# Patient Record
Sex: MALE | Race: WHITE | NOT HISPANIC OR LATINO | Employment: OTHER | URBAN - METROPOLITAN AREA
[De-identification: names, ages, dates, MRNs, and addresses within clinical notes are randomized per-mention and may not be internally consistent; named-entity substitution may affect disease eponyms.]

---

## 2017-01-20 ENCOUNTER — ALLSCRIPTS OFFICE VISIT (OUTPATIENT)
Dept: OTHER | Facility: OTHER | Age: 62
End: 2017-01-20

## 2017-03-01 ENCOUNTER — GENERIC CONVERSION - ENCOUNTER (OUTPATIENT)
Dept: OTHER | Facility: OTHER | Age: 62
End: 2017-03-01

## 2017-03-28 ENCOUNTER — GENERIC CONVERSION - ENCOUNTER (OUTPATIENT)
Dept: OTHER | Facility: OTHER | Age: 62
End: 2017-03-28

## 2017-03-28 LAB
LEFT EYE DIABETIC RETINOPATHY: NORMAL
RIGHT EYE DIABETIC RETINOPATHY: NORMAL

## 2017-06-20 ENCOUNTER — GENERIC CONVERSION - ENCOUNTER (OUTPATIENT)
Dept: OTHER | Facility: OTHER | Age: 62
End: 2017-06-20

## 2017-07-03 DIAGNOSIS — K76.0 FATTY (CHANGE OF) LIVER, NOT ELSEWHERE CLASSIFIED: ICD-10-CM

## 2017-07-03 DIAGNOSIS — K21.9 GASTRO-ESOPHAGEAL REFLUX DISEASE WITHOUT ESOPHAGITIS: ICD-10-CM

## 2017-07-03 DIAGNOSIS — E78.2 MIXED HYPERLIPIDEMIA: ICD-10-CM

## 2017-07-03 DIAGNOSIS — Z12.5 ENCOUNTER FOR SCREENING FOR MALIGNANT NEOPLASM OF PROSTATE: ICD-10-CM

## 2017-07-03 DIAGNOSIS — R74.8 ABNORMAL LEVELS OF OTHER SERUM ENZYMES: ICD-10-CM

## 2017-07-03 DIAGNOSIS — I10 ESSENTIAL (PRIMARY) HYPERTENSION: ICD-10-CM

## 2017-07-03 DIAGNOSIS — E11.9 TYPE 2 DIABETES MELLITUS WITHOUT COMPLICATIONS (HCC): ICD-10-CM

## 2017-07-20 ENCOUNTER — ALLSCRIPTS OFFICE VISIT (OUTPATIENT)
Dept: OTHER | Facility: OTHER | Age: 62
End: 2017-07-20

## 2017-07-24 ENCOUNTER — GENERIC CONVERSION - ENCOUNTER (OUTPATIENT)
Dept: OTHER | Facility: OTHER | Age: 62
End: 2017-07-24

## 2017-08-03 ENCOUNTER — GENERIC CONVERSION - ENCOUNTER (OUTPATIENT)
Dept: OTHER | Facility: OTHER | Age: 62
End: 2017-08-03

## 2017-08-17 ENCOUNTER — GENERIC CONVERSION - ENCOUNTER (OUTPATIENT)
Dept: OTHER | Facility: OTHER | Age: 62
End: 2017-08-17

## 2018-01-01 DIAGNOSIS — K76.0 FATTY (CHANGE OF) LIVER, NOT ELSEWHERE CLASSIFIED: ICD-10-CM

## 2018-01-01 DIAGNOSIS — K80.20 CALCULUS OF GALLBLADDER WITHOUT CHOLECYSTITIS WITHOUT OBSTRUCTION: ICD-10-CM

## 2018-01-01 DIAGNOSIS — Z12.5 ENCOUNTER FOR SCREENING FOR MALIGNANT NEOPLASM OF PROSTATE: ICD-10-CM

## 2018-01-01 DIAGNOSIS — K85.90 ACUTE PANCREATITIS WITHOUT INFECTION OR NECROSIS: ICD-10-CM

## 2018-01-01 DIAGNOSIS — E78.2 MIXED HYPERLIPIDEMIA: ICD-10-CM

## 2018-01-01 DIAGNOSIS — E11.9 TYPE 2 DIABETES MELLITUS WITHOUT COMPLICATIONS (HCC): ICD-10-CM

## 2018-01-01 DIAGNOSIS — Z90.49 ACQUIRED ABSENCE OF OTHER SPECIFIED PARTS OF DIGESTIVE TRACT: ICD-10-CM

## 2018-01-01 DIAGNOSIS — K21.9 GASTRO-ESOPHAGEAL REFLUX DISEASE WITHOUT ESOPHAGITIS: ICD-10-CM

## 2018-01-01 DIAGNOSIS — I10 ESSENTIAL (PRIMARY) HYPERTENSION: ICD-10-CM

## 2018-01-13 VITALS
WEIGHT: 252.6 LBS | BODY MASS INDEX: 37.41 KG/M2 | SYSTOLIC BLOOD PRESSURE: 122 MMHG | HEIGHT: 69 IN | HEART RATE: 76 BPM | DIASTOLIC BLOOD PRESSURE: 74 MMHG | RESPIRATION RATE: 18 BRPM | TEMPERATURE: 100.1 F

## 2018-01-13 VITALS
TEMPERATURE: 99.4 F | HEIGHT: 69 IN | RESPIRATION RATE: 18 BRPM | HEART RATE: 84 BPM | BODY MASS INDEX: 41.12 KG/M2 | SYSTOLIC BLOOD PRESSURE: 124 MMHG | WEIGHT: 277.65 LBS | DIASTOLIC BLOOD PRESSURE: 78 MMHG

## 2018-01-13 NOTE — MISCELLANEOUS
Assessment    1  Acute pancreatitis (577 0) (K85 90)   2  Cholelithiasis (574 20) (K80 20)   3  S/P laparoscopic cholecystectomy (V45 89) (Z90 49)   4  Benign essential hypertension (401 1) (I10)   5  Diabetes mellitus, type 2 (250 00) (E11 9)   6  Mixed hyperlipidemia (272 2) (E78 2)   7  GERD (gastroesophageal reflux disease) (530 81) (K21 9)   8  Fatty liver (571 8) (K76 0)   9  Candidal intertrigo (112 3) (B37 2)   10  Obstructive sleep apnea (327 23) (G47 33)    Plan  Acute pancreatitis, Benign essential hypertension, Cholelithiasis, Diabetes mellitus, type  2, Fatty liver, GERD (gastroesophageal reflux disease), Mixed hyperlipidemia, S/P  laparoscopic cholecystectomy, Screening for prostate cancer    · (1) CBC/PLT/DIFF; Status:Active; Requested [id number]; Perform:[facility]; VHI:[id number];TQAIUMA; For:Acute pancreatitis, Benign essential hypertension, Cholelithiasis, Diabetes mellitus, type 2, Fatty liver, GERD (gastroesophageal reflux disease), Mixed hyperlipidemia, S/P laparoscopic cholecystectomy, Screening for prostate cancer; Ordered By:Linwood Harvey;   · (1) COMPREHENSIVE METABOLIC PANEL; Status:Active; Requested VICTORINA:[id number]; Perform:[facility]; CYF:[id number];IPMLQLZ; For:Acute pancreatitis, Benign essential hypertension, Cholelithiasis, Diabetes mellitus, type 2, Fatty liver, GERD (gastroesophageal reflux disease), Mixed hyperlipidemia, S/P laparoscopic cholecystectomy, Screening for prostate cancer; Ordered By:Linwood Harvey;   · (1) HEMOGLOBIN A1C; Status:Active; Requested TU:[id number]; Perform:[facility]; CCS:[id number];APMJQQL; For:Acute pancreatitis, Benign essential hypertension, Cholelithiasis, Diabetes mellitus, type 2, Fatty liver, GERD (gastroesophageal reflux disease), Mixed hyperlipidemia, S/P laparoscopic cholecystectomy, Screening for prostate cancer; Ordered By:Linwood Harvey;   · (1) LIPID PANEL, FASTING; Status:Active;  Requested DDS:[id number]; Perform:[facility]; AMU:[id number];[id number]; For:Acute pancreatitis, Benign essential hypertension, Cholelithiasis, Diabetes mellitus, type 2, Fatty liver, GERD (gastroesophageal reflux disease), Mixed hyperlipidemia, S/P laparoscopic cholecystectomy, Screening for prostate cancer; Ordered By:Linwood Harvey;   · (1) MICROALBUMIN CREATININE RATIO, RANDOM URINE; Status:Active; Requested  BRX:[id number]; Perform:[facility]; DRL:[id number];LXQAQOK; For:Acute pancreatitis, Benign essential hypertension, Cholelithiasis, Diabetes mellitus, type 2, Fatty liver, GERD (gastroesophageal reflux disease), Mixed hyperlipidemia, S/P laparoscopic cholecystectomy, Screening for prostate cancer; Ordered By:Linwood Harvey;   · (1) PSA (SCREEN) (Dx V76 44 Screen for Prostate Cancer); Status:Active; Requested  ZOA:[id number]; Perform:[facility]; IGL:[id number];QCXGCGL; For:Acute pancreatitis, Benign essential hypertension, Cholelithiasis, Diabetes mellitus, type 2, Fatty liver, GERD (gastroesophageal reflux disease), Mixed hyperlipidemia, S/P laparoscopic cholecystectomy, Screening for prostate cancer; Ordered By:Linwood Harvey;   · (1) TSH; Status:Active; Requested PIJ:[id number]; Perform:[facility]; SPL:[id number];YFRIAUX; For:Acute pancreatitis, Benign essential hypertension, Cholelithiasis, Diabetes mellitus, type 2, Fatty liver, GERD (gastroesophageal reflux disease), Mixed hyperlipidemia, S/P laparoscopic cholecystectomy, Screening for prostate cancer; Ordered By:Michael Harvey Maw; Benign essential hypertension    · From  Lisinopril 20 MG Oral Tablet take 1 tablet by mouth one time daily To  Lisinopril 10 MG Oral Tablet TAKE 1 TABLET DAILY   Rx By: Kisha Ponce; Dispense: 0 Days ; #:90 Tablet;  Refill: 3; For: Benign essential hypertension; OSWALDO = N; Print Rx   · Follow-up visit in 6 months Evaluation and Treatment  Follow-up  Status: Hold For -  Scheduling  Requested for: 67Gjk6528   Ordered; For: Benign essential hypertension; Ordered By: Chacha Montgomery Performed:  Due: 75MBO1269   · We encourage you to begin to make lifestyle changes to help control your blood  pressure  These may include losing weight, increasing your activity level, limiting salt in  your diet, decreasing alcohol intake, and eating a diet low in fat and rich in fruits  and vegetables ; Status:Complete;   Done: 58YMK6609 02:55PM   Ordered; For:Benign essential hypertension; Ordered By:Linwood Harvey;  Candidal intertrigo    · Nystatin 249761 UNIT/GM External Cream; APPLY 2-3 TIMES DAILY TO AFFECTED  AREA(S)   Rx By: Chacha Montgomery; Dispense: 0 Days ; #:2 X 30 GM Tube; Refill: 1; For: Candidal intertrigo; OSWALDO = N; Verified Transmission to [id number] Last Updated By: System, [employer]; 7/20/2017 1:58:50 PM  Diabetes mellitus, type 2    · Eat a normal well-balanced diet ; Status:Complete;   Done: 39OMU6938 02:55PM   Ordered; For:Diabetes mellitus, type 2; Ordered By:Martin, Leitha Heimlich;   · Have your eyes examined by an eye doctor every year ; Status:Complete;   Done:  94VGR0356 02:55PM   Ordered; For:Diabetes mellitus, type 2; Ordered By:Martin, Leitha Heimlich;   · It is important to take good care of your feet if you have diabetes ; Status:Complete;    Done: 15OBL3833 02:55PM   Ordered; For:Diabetes mellitus, type 2; Ordered By:Martin, Leitha Heimlich; Mixed hyperlipidemia    · A diet low in sugar may help your condition ; Status:Complete;   Done: 84VEP4363  02:55PM   Ordered; For:Mixed hyperlipidemia; Ordered By:Martin, Leitha Heimlich;   · Eat a low fat and low cholesterol diet ; Status:Complete;   Done: 85KMY8047 02:55PM   Ordered; For:Mixed hyperlipidemia; Ordered By:Martin, Leitha Heimlich; Discussion/Summary  Discussion Summary:   Continue with current medications  repeat labs prior to next visit in 6 months   follow up with general surgery this month  script for Nystatin cream    Medication SE Review and Pt Understands Tx: Possible side effects of new medications were reviewed with the patient/guardian today  The treatment plan was reviewed with the patient/guardian  The patient/guardian understands and agrees with the treatment plan      History of Present Illness  TCM Communication [place]: The patient is being contacted for follow-up after hospitalization  Hospital course was discussed with the inpatient physician  He was hospitalized at [facility]  The date of admission: 07/01/2017, date of discharge: 07/07/2017  Diagnosis: PANCREATITIS, ACUTE, HTN, GERD, TOBACCO ABUSE, OSMAN, CHRONIC DIASTOLIC CHF  He was discharged to home  Medications were not reviewed today  He scheduled a follow up appointment  Counseling was provided to the patient  Topics counseled included importance of compliance with treatment  Communication performed and completed by Skyler Tate   HPI: Followup visit  SKYLER s/p admission for acute pancreatitis with prior episode of acute gallstone pancreatitis 08/2016  admission abdominal u/s + cholelithiasis without evidence of cholecystitis  mild hepatic steatosis  CT abdomen and pelvis showed without evidence of biliary obstruction or cholecystitis  laura pancreatic edema indicative of acute edematous pancreatitis  no necrosis or fluid collection  MRI/MRCP numerous gallstones  minimal pericholecystic fluid  top normal CBD  no evidence of stones  patient underwent laparoscopic cholecystectomy with intra operative cholangiogram  he is on no pain medications  bowels normal  no voiding difficulties  his dose of Lisinopril was decreased to 10 mg/day  he is Furosemide 20 mg 2/day for LE edema  out patient labs 07/2017 see note  CBC WBC 10 100  Hgb 12 2 MCV 91  platelets 870,858  CMP   creatinine 1 03  electrolytes normal except for Na+ 134   LFTs normal except for alkaline phosphatase 129 and ALT 66  total bilirubin normal at 0 4  type 2 DM diet controlled A1c 01/2017 7 4 increased from 7 0  07/2016 urine microalbumin <0 9  on ACE  no DPN  eye exam > 1 year ago  hypertension BPs stable on current regimen  mixed hyperlipidemia lipid profile 01/2017 cholesterol 161  TGs 204  HDL 33  LDL 87  LFTs normal  07/2016 TSH 1 60  admission 08/2016 for acute gallstone pancreatitis with elevated LFTs patient presented with acute onset of mid epigastric abdominal pain  +nausea  no vomiting or diarrhea  no fevers  he had similar episodes of abdominal pain prior to admission  abdominal u/s had been recommended  Admission testing CBC WBC 17,500  Hemoglobin 14 3  Platelet count 871,573  Lipase 49,945  Chemistry profile random blood glucose 227 creatinine 1 1  Electrolytes normal except for calcium 8 3  Albumin low at 3 1      Normal total bilirubin and alkaline phosphatase  Troponin less than 0 02  NT pro BNP 34  lipid profile normal  TGs 120  chest x ray no active disease  EKG normal sinus rhythm  low voltage no acute changes  abdominal ultrasound fatty liver  Multiple gallstones as well as sludge  Spleen normal  intra and extrahepatic ducts normal common bile duct 5 mm  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  Trace amount of hydronephrosis left kidney  No renal masses  CT scan chest, abdomen and pelvis no pulmonary embolus  atelectatic changes posterior lungs  Small to moderate size pericardial effusion  Peripancreatic edema/fluid  Postsurgical distortion right kidney  9 mm calculus in the lower pole left kidney  Review of Systems  Complete-Male:   Constitutional: recent weight gain and 25 lb weight gain from 01/2017, but no fever and no chills  Eyes: no eyesight problems  Cardiovascular: lower extremity edema and LE edema with prior cardiology evaluation  he was started on Furosemide 40 mg daily  venous dopplers no DVT  not on Ca++ channel blocker  no NSAIDs , but no chest pain and no palpitations  Respiratory: patient diagnosed with OSMAN this year   he is now wearing CPAP  "feels great", but no cough, no orthopnea, no wheezing, no shortness of breath during exertion and no PND  Gastrointestinal: colonoscopy 04/2013 , but no abdominal pain, no nausea, no vomiting, no constipation, no diarrhea and no blood in stools  Genitourinary: 12/2015 PSA 0 49, but no dysuria, no urinary hesitancy, no incontinence and no nocturia  Musculoskeletal: no arthralgias and no myalgias  Integumentary: a rash and pruritic rash inguinal area  Neurological: no headache and no dizziness  Endocrine: erectile dysfunction and uses prn Viagra  , but no muscle weakness  Active Problems    1  Benign essential hypertension (401 1) (I10)   2  Diabetes mellitus, type 2 (250 00) (E11 9)   3  ED (erectile dysfunction) (607 84) (N52 9)   4  Fatty liver (571 8) (K76 0)   5  GERD (gastroesophageal reflux disease) (530 81) (K21 9)   6  Mixed hyperlipidemia (272 2) (E78 2)   7  Screening for prostate cancer (V76 44) (Z12 5)    Past Medical History    1  History of Elevated liver enzymes (790 5) (R74 8)   2  History of Impaired fasting glucose (790 21) (R73 01)   3  History of Nephrolithiasis (592 0) (N20 0)   4  History of Pericardial effusion (423 9) (I31 3)   5  History of Rectal pain (569 42) (K62 89)    Surgical History    1  History of Hemorrhoidectomy   2  History of Kidney Surgery   3  History of Renal Lithotripsy    Family History  Father    1  Family history of Diabetes mellitus, type 2    Social History    · Current every day smoker (305 1) (F17 200)    Current Meds   1  Aspirin 81 MG Oral Tablet Delayed Release; Take 1 tablet daily Recorded   2  Atorvastatin Calcium 10 MG Oral Tablet; TAKE 1 TABLET DAILY; Therapy: 61ITR1498 to (Amanda Willard)  Requested for: 91TOD4706; Last   Rx:12Mar2017 Ordered   3  Fish Oil CAPS; Therapy: (Recorded:19Mar2013) to Recorded   4  Furosemide 20 MG Oral Tablet; take 1 tablet daily as needed Recorded   5   Lisinopril 20 MG Oral Tablet; take 1 tablet by mouth one time daily; Therapy: 33LHS8555 to (Soha Rho)  Requested for: 91WKX8311; Last   Rx:12Mar2017 Ordered   6  Multivitamins CAPS; Therapy: (Recorded:19Mar2013) to Recorded   7  Omeprazole 40 MG Oral Capsule Delayed Release; take 1 capsule daily; Therapy: 56Btu9834 to (Evaluate:46Cbg0168)  Requested for: 87JIU0053; Last   Rx:08Jun2017 Ordered   8  Viagra 100 MG Oral Tablet; TAKE AS DIRECTED; Therapy: 60JNF6615 to (Last Rx:06Apr2016)  Requested for: 06Apr2016 Ordered    Allergies    1  No Known Drug Allergies    Vitals  Signs   Recorded: 20Jul2017 01:37PM   Temperature: 100 1 F  Heart Rate: 76  Respiration: 18  Systolic: 148  Diastolic: 74  Height: 5 ft 9 in  Weight: 252 lb 9 6 oz  BMI Calculated: 37 3  BSA Calculated: 2 28    Physical Exam    Constitutional   General appearance: No acute distress, well appearing and well nourished  Eyes   Conjunctiva and lids: No erythema, swelling or discharge  sclera anicteric  Ears, Nose, Mouth, and Throat   Otoscopic examination: Tympanic membranes translucent with normal light reflex  Canals patent without erythema  Lips, teeth, and gums: Abnormal   Examination of the teeth revealed upper dentures  Oropharynx: Abnormal   The palate examination showed a floppy soft palate  Neck   Neck: Supple, symmetric, trachea midline, no masses  Thyroid: Normal, no thyromegaly  There were no thyroid nodules  Pulmonary   Auscultation of lungs: Clear to auscultation  no rales or crackles were heard bilaterally  no wheezing  no diminished breath sounds  Cardiovascular   Auscultation of heart: Normal rate and rhythm, normal S1 and S2, no murmurs  Heart sounds: no gallop heard  Carotid pulses: 2+ bilaterally  no bruit heard over the right carotid and no bruit heard over the left carotid  Abdominal aorta: Normal   Abdominal aorta: no bruit heard     Examination of extremities for edema and/or varicosities: Abnormal   bilateral ankle trace+ pitting edema  + stasis pigmentation changes  no calf tenderness or Homans sign  Abdomen   Abdomen: Non-tender, no masses  Bowel sounds were normal  Skin findings: a scar  There was a negative Chaudhary's sign  well healed incisions abdomen  Liver and spleen: No hepatomegaly or splenomegaly  Examination for hernias: Abnormal   No reducible umbilical hernia was palpated  Lymphatic   Palpation of lymph nodes in neck: No lymphadenopathy  no anterior cervical node enlargement, no posterior cervical node enlargement, no submandibular node enlargement and no supraclavicular node enlargement  Skin   Skin and subcutaneous tissue: Abnormal   candidal intertrigo inguinal areas and scrotum  Psychiatric   Mood and affect: Normal        Results/Data  PHQ-2 Adult Depression Screening 21Bqn9811 01:59PM User, Sonal     Test Name Result Flag Reference   PHQ-2 Adult Depression Score 0     Over the last two weeks, how often have you been bothered by any of the following problems? Little interest or pleasure in doing things: Not at all - 0  Feeling down, depressed, or hopeless: Not at all - 0   PHQ-2 Adult Depression Screening Negative       (1) COMPREHENSIVE METABOLIC PANEL 59CPD8619 96:13CD      Test Name Result Flag Reference   GLUCOSE,RANDM 104     SODIUM 134     POTASSIUM 5 0     CHLORIDE 100     CARBON DIOXIDE 25     ANION GAP (CALC) 9     BLOOD UREA NITROGEN 20     CREATININE 1 08     CALCIUM 9 5     BILI, TOTAL 0 4     ALK PHOSPHATAS 129     ALT (SGPT) 66     AST(SGOT) 22     ALBUMIN 2 9     TOTAL PROTEIN 6 6       Summary / No summary entered :      No summary entered  Documents attached :      [address] Work - Lia Monsalve;  Enc: [id number] - Image Encounter - Lia Fitzpatrickers -      [facility]) (Additional Information Document)  (1) CBC/ PLT (NO DIFF) 62HDY5640 12:00AM      Test Name Result Flag Reference   HEMATOCRIT 36 0     HEMOGLOBIN 12 2     MCHC 33 9     MCH 30 8     MCV 91     PLATELET COUNT 527     RBC COUNT 3 97     RDW 14 6     WBC COUNT 10 1     MPV 7 5       Summary / No summary entered :      No summary entered  Documents attached :      [address] Work - Arnie Montelongo; Enc: [id number] - Image Encounter - Lujohnna Pearsona -      ([facility]) (Additional Information Document)  (1) CBC/PLT/DIFF 71KFL6034 04:20PM Lurlean Michela     Test Name Result Flag Reference   WBC COUNT 9 1     RBC COUNT 4 71     HEMOGLOBIN 14 4     HEMATOCRIT 42 5       Summary / No summary entered :      No summary entered  Documents attached :      [address] Work - Arnie Montelongo; Enc: [id number] - Image Encounter - Arnie Pearsona -      ([facility]) (Additional Information Document)  (1) HEMOGLOBIN A1C 23EMX9329 04:15PM Lurzakiya Pearsona     Test Name Result Flag Reference   HEMOGLOBIN A1C 7 4     EST  AVG  GLUCOSE 166       Summary / No summary entered :      No summary entered  Documents attached :      [address] Work - Arnie Montelongo; Enc: [id number] - Image Encounter - [facility] -      [facility]) (Additional Information Document)  (1) LIPID PANEL, FASTING 89MVV6815 04:15PM Lujohnna Pearsona     Test Name Result Flag Reference   CHOLESTEROL 161     HDL,DIRECT 33     LDL CHOLESTEROL CALCULATED 87     TRIGLYCERIDES 204       Summary / No summary entered :      No summary entered  Documents attached :      [address] Work - Arnie Montelongo; Enc: [id number] - [facility]      [facility]) (Additional Information Document)  VAS LOWER LIMB VENOUS DUPLEX STUDY, COMPLETE BILATERAL 37Znn8472 08:08AM EPIC, Provider   Test ordered by: Chaka De Santiago     Test Name Result Flag Reference   VAS LOWER LIMB VENOUS DUPLEX STUDY, COMPLETE BILATERAL (Report)     [facility] REPORT   CLINICAL:   Indications: Bilateral Other specified soft tissue disorders [M79 89]  Gradual   onset of calf and ankle swelling, bilaterally              CONCLUSION:   Impression:   RIGHT LOWER LIMB:   No evidence of acute or occlusive deep vein thrombosis  No evidence of superficial thrombophlebitis noted  Doppler evaluation shows a response to augmentation maneuvers  Popliteal, posterior tibial and anterior tibial arterial Doppler waveforms are   biphasic  LEFT LOWER LIMB:   No evidence of acute or occlusive deep vein thrombosis  No evidence of superficial thrombophlebitis noted  Doppler evaluation shows a response to augmentation maneuvers  Popliteal, posterior tibial and anterior tibial arterial Doppler waveforms are   biphasic  SIGNATURE:   Electronically Signed by: Jordyn Barry MD on 2016-08-13 12:40:14 PM     (1) MICROALBUMIN CREATININE RATIO, RANDOM URINE 52FRR7877 12:00AM Lurlean Massa     Test Name Result Flag Reference   MICROALBUMIN,URINE <0 9     CREATININE URINE 61 4       Summary / No summary entered :      No summary entered  Documents attached :      [address] Work - Lurlean Massa; Enc: [id number] - Image Encounter - Lurlean Massa -      (Family Medicine) (Additional Information Document)  (1) TSH 22YQH8867 12:00AM Lurlean Massa     Test Name Result Flag Reference   TSH 1 60       Summary / No summary entered :      No summary entered  Documents attached :      [address] Work - Lurlean Massa; Enc: [id number] - Image Encounter - Lurlean Massa -      [facility]) (Additional Information Document)  (1) PSA (SCREEN) (Dx V76 44 Screen for Prostate Cancer) 84AYQ4834 12:00AM Lurlean Massa     Test Name Result Flag Reference   PSA 0 49       Future Appointments    Date/Time Provider Specialty Site   01/23/2018 11:00 AM ARTHUR Arriaga   Family Medicine [id number] [facility]     Signatures   Electronically signed by : ARTHUR Blanchard ; Jul 20 2017  2:56PM EST                       (Author)

## 2018-01-16 NOTE — MISCELLANEOUS
Assessment    1  Acute gallstone pancreatitis (577 0,574 20) (K85 1)   2  Elevated liver enzymes (790 5) (R74 8)   3  Pericardial effusion (423 9) (I31 3)   4  Benign essential hypertension (401 1) (I10)   5  Mixed hyperlipidemia (272 2) (E78 2)   6  Fatty liver (571 8) (K76 0)   7  GERD (gastroesophageal reflux disease) (530 81) (K21 9)   8  Diabetes mellitus, type 2 (250 00) (E11 9)   9  Hemorrhoids (455 6) (K64 9)    Plan  Acute gallstone pancreatitis, Benign essential hypertension, Elevated liver enzymes    · (1) COMPREHENSIVE METABOLIC PANEL; Status:Active; Requested for:19Zeo5932;    Perform:[facility]; Due:32Ysx8024;Ordered; For:Acute gallstone pancreatitis, Benign essential hypertension, Elevated liver enzymes; Ordered By:Bishop Harvey Fairly;   · (1) LIPASE; Status:Active; Requested for:36Pyd4616;    Perform:[facility]; Due:46Kmx8239;Ordered; For:Acute gallstone pancreatitis, Benign essential hypertension, Elevated liver enzymes; Ordered By:Bishop Harvey Fairly; Hemorrhoids    · Lidocaine 5 % External Ointment; APPLY TO AFECTED AREA 3 TIMES A DAY as  needed   Rx By: Nilam Bhatti; Dispense: 20 Days ; #:1 X 30 GM Tube; Refill: 3; For: Hemorrhoids; OSWALDO = N; Sent To: [id number]   Discussion/Summary  Discussion Summary:   Continue with current medications  low fat diet  repeat CMP and lipase  schedule follow up visit with general surgery  repeat echocardiogram in 3 to 4 weeks to follow up on pericardial effusion  advise to call if any changes  History of Present Illness  TCM Communication [facility]: The patient is being contacted for follow-up after hospitalization and 08/29/2016  He was hospitalized at [address] [place] [address] Drive  The date of admission: 08/23/2016, date of discharge: 08/25/2016  Diagnosis: PANCREATITIS/GALLSTONES  He was discharged to home  Medications reviewed and updated today  He scheduled a follow up appointment  Follow-up appointments with other specialists: WILL NEED REFERRALS TO SPECIALISTS  Communication performed and completed by Reyna Farias   HPI: Followup visit  SKYLER s/p admission for acute gallstone pancreatitis with elevated LFTs patient presented with acute onset of mid epigastric abdominal pain  +nausea  no vomiting or diarrhea  no fevers  he had similar episodes of abdominal pain prior to admission  abdominal u/s recommended at his last office visit  Admission testing CBC WBC 17,500  Hemoglobin 14 3  Platelet count 997,179  Lipase 49,945  Chemistry profile random blood glucose 227 creatinine 1 1  Electrolytes normal except for calcium 8 3  Albumin low at 3 1      Normal total bilirubin and alkaline phosphatase  Troponin less than 0 02  NT pro BNP 34  lipid profile normal  TGs 120  chest x ray no active disease  EKG normal sinus rhythm  low voltage no acute changes  abdominal ultrasound fatty liver  Multiple gallstones as well as sludge  Spleen normal  intra and extrahepatic ducts normal common bile duct 5 mm  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  Trace amount of hydronephrosis left kidney  No renal masses  CT scan chest, abdomen and pelvis no pulmonary embolus  atelectatic changes posterior lungs  Small to moderate size pericardial effusion  Peripancreatic edema/fluid  Postsurgical distortion right kidney  9 mm calculus in the lower pole left kidney  medications reviewed  Labs 07/2016  see note  type 2 DM A1c 7 0  urine microalbumin < 0 9  + FH type 2 DM father  hyperlipidemia  on Atorvastatin 10 mg daily and 2 fish oil capsules a day  lipid profile cholesterol 143, triglycerides 171 increased from 118, HDL 27, LDL 81  LFTs normal  hypertension blood pressures stable on current regimen  creatinine 0 82  electrolytes normal  TSH 1 60      Review of Systems  Complete-Male:   Constitutional: recent 5 lb weight loss, but no fever and no chills  Eyes: no eyesight problems     Cardiovascular: lower extremity edema and LE edema started prior to admission  he was seen by cardiology  he was started on Furosemide 40 mg daily  venous dopplers no DVT  he was scheduled for echocardiogram and stress test  not on Ca++ channel blocker  no NSAIDs , but no chest pain and no palpitations  Respiratory: no cough, no orthopnea, no wheezing, no shortness of breath during exertion and no PND  Gastrointestinal: colonoscopy 04/2013 , but no abdominal pain, no nausea, no vomiting, no constipation, no diarrhea and no blood in stools  Genitourinary: no dysuria, no urinary hesitancy, no incontinence and no nocturia  Musculoskeletal: no arthralgias and no myalgias  Neurological: no headache and no dizziness  Endocrine: erectile dysfunction and uses prn Viagra  , but no muscle weakness  Active Problems    1  Benign essential hypertension (401 1) (I10)   2  Colon cancer screening (V76 51) (Z12 11)   3  Diabetes mellitus, type 2 (250 00) (E11 9)   4  ED (erectile dysfunction) (607 84) (N52 9)   5  Fatty liver (571 8) (K76 0)   6  GERD (gastroesophageal reflux disease) (530 81) (K21 9)   7  Mixed hyperlipidemia (272 2) (E78 2)    Past Medical History    1  History of Impaired fasting glucose (790 21) (R73 01)   2  History of Nephrolithiasis (592 0) (N20 0)   3  History of Rectal pain (569 42) (K62 89)    Surgical History    1  History of Hemorrhoidectomy   2  History of Kidney Surgery   3  History of Renal Lithotripsy    Family History  Father    1  Family history of Diabetes mellitus, type 2    Social History    · Current Every Day Smoker (305 1)    Current Meds   1  Aspirin 81 MG Oral Tablet Delayed Release; Take 1 tablet daily Recorded   2  Atorvastatin Calcium 10 MG Oral Tablet; TAKE 1 TABLET DAILY; Therapy: 32SQY1705 to (Evaluate:16Mar2017)  Requested for: 21Mar2016; Last   Rx:21Mar2016 Ordered   3  Fish Oil CAPS; Therapy: (Recorded:19Mar2013) to Recorded   4  Furosemide 20 MG Oral Tablet; take 1 tablet daily as needed Recorded   5   Lisinopril 20 MG Oral Tablet; take 1 tablet by mouth one time daily; Therapy: 04HKP2424 to (Evaluate:16Mar2017)  Requested for: 21Mar2016; Last   Rx:21Mar2016 Ordered   6  Multivitamins CAPS; Therapy: (Recorded:19Mar2013) to Recorded   7  Omeprazole 40 MG Oral Capsule Delayed Release; take 1 capsule daily; Therapy: 59YEL5992 to (Last Rx:98Asc2561)  Requested for: 20Jul2016 Ordered   8  Viagra 100 MG Oral Tablet; TAKE AS DIRECTED; Therapy: 88ZLU9935 to (Last Rx:06Apr2016)  Requested for: 06Apr2016 Ordered   9  Vitamin C CAPS; Therapy: (Recorded:19Mar2013) to Recorded    Allergies    1  No Known Drug Allergies    Vitals  Signs   Recorded: 01XCH7069 58:74VH   Systolic: 179  Diastolic: 76  Heart Rate: 80  Respiration: 16  Temperature: 100 4 F  Height: 5 ft 9 in  Weight: 257 lb   BMI Calculated: 37 95  BSA Calculated: 2 3    Physical Exam    Constitutional   General appearance: No acute distress, well appearing and well nourished  Eyes   Conjunctiva and lids: No erythema, swelling or discharge  sclera anicteric  Ears, Nose, Mouth, and Throat   Otoscopic examination: Tympanic membranes translucent with normal light reflex  Canals patent without erythema  Lips, teeth, and gums: Abnormal   Examination of the teeth revealed upper dentures  Oropharynx: Abnormal   The palate examination showed a floppy soft palate  Neck   Neck: Supple, symmetric, trachea midline, no masses  Thyroid: Normal, no thyromegaly  There were no thyroid nodules  Pulmonary   Auscultation of lungs: Clear to auscultation  no rales or crackles were heard bilaterally  no wheezing  no diminished breath sounds  Cardiovascular   Auscultation of heart: Normal rate and rhythm, normal S1 and S2, no murmurs  Heart sounds: no gallop heard  Carotid pulses: 2+ bilaterally  no bruit heard over the right carotid and no bruit heard over the left carotid  Abdominal aorta: Normal   Abdominal aorta: no bruit heard     Examination of extremities for edema and/or varicosities: Abnormal   bilateral ankle 1+ pitting edema and bilateral pretibial 1+ pitting edema  no calf tenderness or Homans sign  Abdomen   Abdomen: Non-tender, no masses  There was a negative Chaudhary's sign  Liver and spleen: No hepatomegaly or splenomegaly  Lymphatic   Palpation of lymph nodes in neck: No lymphadenopathy  no anterior cervical node enlargement, no posterior cervical node enlargement, no submandibular node enlargement and no supraclavicular node enlargement  Skin   Skin and subcutaneous tissue: Normal without rashes or lesions  Psychiatric   Mood and affect: Normal        Results/Data  VAS LOWER LIMB VENOUS DUPLEX STUDY, COMPLETE BILATERAL [id number] 08:08AM EPIC, Provider   Test ordered by: Beata Fish     Test Name Result Flag Reference   VAS LOWER LIMB VENOUS DUPLEX STUDY, COMPLETE BILATERAL (Report)     [facility] REPORT   CLINICAL:   Indications: Bilateral Other specified soft tissue disorders [M79 89]  Gradual   onset of calf and ankle swelling, bilaterally  CONCLUSION:   Impression:   RIGHT LOWER LIMB:   No evidence of acute or occlusive deep vein thrombosis  No evidence of superficial thrombophlebitis noted  Doppler evaluation shows a response to augmentation maneuvers  Popliteal, posterior tibial and anterior tibial arterial Doppler waveforms are   biphasic  LEFT LOWER LIMB:   No evidence of acute or occlusive deep vein thrombosis  No evidence of superficial thrombophlebitis noted  Doppler evaluation shows a response to augmentation maneuvers  Popliteal, posterior tibial and anterior tibial arterial Doppler waveforms are   biphasic        SIGNATURE:   Electronically Signed by: Yoel Chowdhury MD on 2016-08-13 12:40:14 PM     (1) CBC/PLT/DIFF 17HHZ7264 12:00AM Lexie Malloy     Test Name Result Flag Reference   WBC COUNT 9 2       Summary / No summary entered :      No summary entered  Documents attached :      [address] Work - Arslan Praneeth; Enc: [id number] - Image Encounter - Arslan Praneeth -      [facility]) (Additional Information Document)  (1) COMPREHENSIVE METABOLIC PANEL 21BAV3367 12:22ZC Arslan Praneeth     Test Name Result Flag Reference   GLUCOSE,RANDM 121       Summary / No summary entered :      No summary entered  Documents attached :      [address] Work - Arslan Praneeth; Enc: [id number] - Image Encounter - Arslan Praneeth -      [facility]) (Additional Information Document)  (1) LIPID PANEL, FASTING 25NZC0308 12:00AM Arslan Praneeth     Test Name Result Flag Reference   CHOLESTEROL 143     HDL,DIRECT 27     LDL CHOLESTEROL CALCULATED 81     TRIGLYCERIDES 173     TCHOL/HDL RATIO 5 30       Summary / No summary entered :      No summary entered  Documents attached :      [address] Work - Arslan Praneeth; Enc: [id number] - Image Encounter - Arslan Praneeth -      (Family Medicine) (Additional Information Document)  (1) HEMOGLOBIN A1C 14JPW4776 12:00AM Arslan Praneeth     Test Name Result Flag Reference   HEMOGLOBIN A1C 7 0     EST  AVG  GLUCOSE 154       Summary / No summary entered :      No summary entered  Documents attached :      [address] Work - Arslan Praneeth; Enc: [id number] - Image Encounter - Arslan Praneeth -      [facility]) (Additional Information Document)  (1) MICROALBUMIN CREATININE RATIO, RANDOM URINE 43AYR1322 12:00AM Arslan Praneeth     Test Name Result Flag Reference   MICROALBUMIN,URINE <0 9     CREATININE URINE 61 4       Summary / No summary entered :      No summary entered  Documents attached :      [address] Work - Arslan Praneeth; Enc: [id number] - Image Encounter - Arslan Praneeth -      (Family Medicine) (Additional Information Document)  (1) TSH 86JEJ7370 12:00AM Arslan Praneeth     Test Name Result Flag Reference   TSH 1 60       Summary / No summary entered :      No summary entered  Documents attached :      [address] Work - Arslan Praneeth;  Enc: [id number] - Image Encounter - Arslan Praneeth -      [facility]) (Additional Information Document)  Future Appointments    Date/Time Provider Specialty Site   01/20/2017 08:00 AM ARTHUR Suarez   Family Medicine [address] [facility]     Signatures   Electronically signed by : Kel Garcia, ; Aug 25 2016  4:48PM EST                       (Author)    Electronically signed by : Lucetta Bosworth, M D ; Aug 30 2016  7:04AM EST                       (Author)

## 2018-01-16 NOTE — MISCELLANEOUS
Message   Date: 01 Mar 2017 9:39 AM EST, Recorded By: Inessa Gill For: Nikunj [place]: Juany Lopez, Significant Other   Phone: [phone] (Home)   Reason: Medical Complaint   Patients significant other, Juany Lopez, called and stated since last night he is having B/L lower leg swelling that is getting worse  He states it is painful, red, pitted, no sores or weeping and unsure if warm to touch  I advised for her to take him to ER to be further evaluated  I advised they will want to check for blood clots and cellulitis  Juany Lopez understood and agreed  Active Problems    1  Benign essential hypertension (401 1) (I10)   2  Diabetes mellitus, type 2 (250 00) (E11 9)   3  ED (erectile dysfunction) (607 84) (N52 9)   4  Fatty liver (571 8) (K76 0)   5  GERD (gastroesophageal reflux disease) (530 81) (K21 9)   6  Mixed hyperlipidemia (272 2) (E78 2)   7  Screening for prostate cancer (V76 44) (Z12 5)    Current Meds   1  Aspirin 81 MG Oral Tablet Delayed Release; Take 1 tablet daily Recorded   2  Atorvastatin Calcium 10 MG Oral Tablet; TAKE 1 TABLET DAILY; Therapy: 32YEY3438 to (Evaluate:2017)  Requested for: 2016; Last   Rx:2016 Ordered   3  Fish Oil CAPS; Therapy: (Recorded:2013) to Recorded   4  Furosemide 20 MG Oral Tablet; take 1 tablet daily as needed Recorded   5  Lisinopril 20 MG Oral Tablet; take 1 tablet by mouth one time daily; Therapy: 98PNH3469 to (Evaluate:2017)  Requested for: 2016; Last   Rx:2016 Ordered   6  Multivitamins CAPS; Therapy: (Recorded:2013) to Recorded   7  Omeprazole 40 MG Oral Capsule Delayed Release; take 1 capsule daily; Therapy: 55Cln5405 to (Evaluate:2017)  Requested for: 07PPO6398; Last   Rx:2016 Ordered   8  Viagra 100 MG Oral Tablet; TAKE AS DIRECTED; Therapy: 78HZS9675 to (Last Rx:2016)  Requested for: 2016 Ordered   9  Vitamin C CAPS; Therapy: (Recorded:2013) to Recorded    Allergies    1  No Known Drug Allergies    Signatures   Electronically signed by : Shelley Willard, ; Mar  1 2017  9:43AM EST                       (Author)    Electronically signed by : ARTHUR Potter ; Mar  1 2017  8:09PM EST                       (Author)

## 2018-01-23 ENCOUNTER — ALLSCRIPTS OFFICE VISIT (OUTPATIENT)
Dept: OTHER | Facility: OTHER | Age: 63
End: 2018-01-23

## 2018-01-24 NOTE — PROGRESS NOTES
Assessment    1  Diabetes mellitus, type 2 (250 00) (E11 9)   2  Benign essential hypertension (401 1) (I10)   3  Mixed hyperlipidemia (272 2) (E78 2)   4  GERD (gastroesophageal reflux disease) (530 81) (K21 9)   5  Fatty liver (571 8) (K76 0)   6  Obstructive sleep apnea (327 23) (G47 33)    Plan  Benign essential hypertension    · We encourage you to begin to make lifestyle changes to help control your blood  pressure  These may include losing weight, increasing your activity level, limiting salt in  your diet, decreasing alcohol intake, and eating a diet low in fat and rich in fruits  and vegetables ; Status:Complete;   Done: 18YBB7680  Benign essential hypertension, Diabetes mellitus, type 2, Fatty liver, GERD  (gastroesophageal reflux disease), Mixed hyperlipidemia, Obstructive sleep apnea    · (1) CBC/PLT/DIFF; Status:Active; Requested for:64Edk5684;    · (1) COMPREHENSIVE METABOLIC PANEL; Status:Active; Requested for:87Nlv2592;    · (1) HEMOGLOBIN A1C; Status:Active; Requested for:88Cbu9363;    · (1) LIPID PANEL, FASTING; Status:Active; Requested for:59Njo5505;   Diabetes mellitus, type 2    · Aspirin 81 MG Oral Tablet Delayed Release; Take 1 tablet daily   · Follow-up visit in 6 months Evaluation and Treatment  Follow-up  Status: Hold For -  Scheduling  Requested for: 40HZR0195   · Eat a normal well-balanced diet ; Status:Complete;   Done: 37YPX2426   · Have your eyes examined by an eye doctor every year ; Status:Complete;   Done:  42HQJ7663   · It is important to take good care of your feet if you have diabetes ; Status:Complete;    Done: 54DKP2804  Fatty liver    · (1) HEP C ANTIBODY; Status:Active; Requested ELQ:48IFD4684; Mixed hyperlipidemia    · A diet low in sugar may help your condition ; Status:Complete;   Done: 24LZN8465   · Eat a low fat and low cholesterol diet ; Status:Complete;   Done: 27CYK5474    Discussion/Summary    Continue with current medications   repeat labs prior to next visit in 6 months  he is not interested in a flu vaccine or pneumococcal vaccine  Possible side effects of new medications were reviewed with the patient/guardian today  The treatment plan was reviewed with the patient/guardian  The patient/guardian understands and agrees with the treatment plan      History of Present Illness  Followup visit  medications reviewed  type 2 DM diet controlled  01/2018 6 5 decreased from 7 4  urine microalbumin 2 4  on ACE  no DPN  current with eye exam  hypertension blood pressures stable on current regimen of Lisinopril 10 mg daily  01/2018 creatinine 0 92  electrolytes normal  Hgb 13  8  hyperlipidemia mixed type on Atorvastatin 10 mg daily and fish oils  01/2018 Lipid profile cholesterol 133  Triglycerides 237  HDL 28  LDL 58  LFTs normal  TSH 1 30  admission 07/2017 for acute pancreatitis with prior episode of acute gallstone pancreatitis 08/2016  admission abdominal u/s + cholelithiasis without evidence of cholecystitis  mild hepatic steatosis  CT abdomen and pelvis showed without evidence of biliary obstruction or cholecystitis  laura pancreatic edema indicative of acute edematous pancreatitis  no necrosis or fluid collection  MRI/MRCP numerous gallstones  minimal pericholecystic fluid  top normal CBD  no evidence of stones  patient underwent laparoscopic cholecystectomy with intra operative cholangiogram  admission 08/2016 for acute gallstone pancreatitis with elevated LFTs  Admission testing CBC WBC 17,500  Hemoglobin 14 3  Platelet count 234,356  Lipase 49,945  Chemistry profile random blood glucose 227 creatinine 1 1  Electrolytes normal except for calcium 8 3  Albumin low at 3 1      Normal total bilirubin and alkaline phosphatase  Troponin less than 0 02  NT pro BNP 34  lipid profile normal  TGs 120  chest x ray no active disease  EKG normal sinus rhythm  low voltage no acute changes  abdominal ultrasound fatty liver  Multiple gallstones as well as sludge   Spleen normal  intra and extrahepatic ducts normal common bile duct 5 mm  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  Trace amount of hydronephrosis left kidney  No renal masses  CT scan chest, abdomen and pelvis no pulmonary embolus  atelectatic changes posterior lungs  Small to moderate size pericardial effusion  Peripancreatic edema/fluid  Postsurgical distortion right kidney  9 mm calculus in the lower pole left kidney  Review of Systems    Constitutional: 31 lb weight loss from 01/2017 with dieting, but no fever, no recent weight gain, no chills and no recent weight loss  Eyes: no eyesight problems  Cardiovascular: lower extremity edema and LE edema with prior cardiology evaluation  he was started on Furosemide 40 mg daily  venous dopplers no DVT  not on Ca++ channel blocker  no NSAIDs , but no chest pain and no palpitations  Respiratory: OSMAN wearing CPAP  "feels great", but no cough, no orthopnea, no wheezing, no shortness of breath during exertion and no PND  Gastrointestinal: colonoscopy 04/2013 , but no abdominal pain, no nausea, no vomiting, no constipation, no diarrhea and no blood in stools  Genitourinary: 01/2018 PSA 0 59, but no dysuria, no urinary hesitancy, no incontinence and no nocturia  Musculoskeletal: no arthralgias and no myalgias  Integumentary: no rashes and no skin lesions  Neurological: no headache and no dizziness  Endocrine: erectile dysfunction and uses prn Viagra  , but no muscle weakness  Active Problems    1  Benign essential hypertension (401 1) (I10)   2  Colon cancer screening (V76 51) (Z12 11)   3  Diabetes mellitus, type 2 (250 00) (E11 9)   4  ED (erectile dysfunction) (607 84) (N52 9)   5  Fatty liver (571 8) (K76 0)   6  GERD (gastroesophageal reflux disease) (530 81) (K21 9)   7  Mixed hyperlipidemia (272 2) (E78 2)   8  Obstructive sleep apnea (327 23) (G47 33)    Past Medical History    1   History of Candidal intertrigo (112 3) (B37 2)   2  History of Elevated liver enzymes (790 5) (R74 8)   3  History of acute pancreatitis (V12 79) (Z87 19)   4  History of cholelithiasis (V12 79) (Z87 19)   5  History of Impaired fasting glucose (790 21) (R73 01)   6  History of Nephrolithiasis (592 0) (N20 0)   7  History of Pericardial effusion (423 9) (I31 3)   8  History of Rectal pain (569 42) (K62 89)   9  History of S/P laparoscopic cholecystectomy (V45 89) (Z90 49)    Surgical History    1  History of Cholecystectomy Laparoscopic   2  History of Hemorrhoidectomy   3  History of Kidney Surgery   4  History of Renal Lithotripsy    Family History  Father    1  Family history of Diabetes mellitus, type 2    Social History    · Current every day smoker (305 1) (F17 200)    Current Meds   1  Aspirin 81 MG Oral Tablet Delayed Release; Take 1 tablet daily Recorded   2  Atorvastatin Calcium 10 MG Oral Tablet; TAKE 1 TABLET DAILY; Therapy: 95TBY7655 to (Ilona Day)  Requested for: 63YPT5104; Last   Rx:12Mar2017 Ordered   3  Fish Oil CAPS; Therapy: (Recorded:19Mar2013) to Recorded   4  Furosemide 20 MG Oral Tablet; take 1 tablet daily as needed Recorded   5  Lisinopril 10 MG Oral Tablet; TAKE 1 TABLET DAILY; Therapy: 91DYS6886 to (Last Rx:13Vzv7473)  Requested for: 53Cet5849 Ordered   6  Multivitamins CAPS; Therapy: (Recorded:19Mar2013) to Recorded   7  Omeprazole 40 MG Oral Capsule Delayed Release; take 1 capsule daily; Therapy: 94Hks0717 to (Leny Alfred)  Requested for: 16IKB4067; Last   Rx:62Hnr5053 Ordered   8  Viagra 100 MG Oral Tablet; TAKE AS DIRECTED; Therapy: 76YRF7533 to (Last Rx:73Gvi0604)  Requested for: 06Wtc9746 Ordered    Allergies    1   No Known Drug Allergies    Vitals  Vital Signs    Recorded: 57DZT0201 10:56AM   Temperature 98 9 F   Heart Rate 76   Respiration 16   Systolic 467   Diastolic 62   Height 5 ft 9 in   Weight 246 lb    BMI Calculated 36 33   BSA Calculated 2 26     Physical Exam    Constitutional   General appearance: No acute distress, well appearing and well nourished  Eyes   Conjunctiva and lids: No erythema, swelling or discharge  sclera anicteric  Pupils and irises: Equal, round, reactive to light  Ophthalmoscopic examination: Normal fundi and optic discs  Ears, Nose, Mouth, and Throat   Otoscopic examination: Tympanic membranes translucent with normal light reflex  Canals patent without erythema  Lips, teeth, and gums: Abnormal   Examination of the teeth revealed upper dentures  Oropharynx: Abnormal   The palate examination showed a floppy soft palate  Neck   Neck: Supple, symmetric, trachea midline, no masses  Thyroid: Normal, no thyromegaly  There were no thyroid nodules  Pulmonary   Auscultation of lungs: Clear to auscultation  no rales or crackles were heard bilaterally  no wheezing  no diminished breath sounds  Cardiovascular   Auscultation of heart: Normal rate and rhythm, normal S1 and S2, no murmurs  Heart sounds: no gallop heard  Carotid pulses: 2+ bilaterally  no bruit heard over the right carotid and no bruit heard over the left carotid  Abdominal aorta: Normal   Abdominal aorta: no bruit heard  Examination of extremities for edema and/or varicosities: Normal     Abdomen   Abdomen: Non-tender, no masses  Bowel sounds were normal    Liver and spleen: No hepatomegaly or splenomegaly  Examination for hernias: Abnormal   A(n) reducible umbilical hernia was palpated  Lymphatic   Palpation of lymph nodes in neck: No lymphadenopathy  no anterior cervical node enlargement, no posterior cervical node enlargement, no submandibular node enlargement and no supraclavicular node enlargement  Musculoskeletal   Inspection/palpation of digits and nails: Normal without clubbing or cyanosis  Skin   Skin and subcutaneous tissue: Normal without rashes or lesions      Psychiatric   Mood and affect: Normal        Results/Data  (1) COMPREHENSIVE METABOLIC PANEL 71PTD2862 75:09QR      Test Name Result Flag Reference   GLUCOSE,RANDM 104     SODIUM 134     POTASSIUM 5 0     CHLORIDE 100     CARBON DIOXIDE 25     ANION GAP (CALC) 9     BLOOD UREA NITROGEN 20     CREATININE 1 08     CALCIUM 9 5     BILI, TOTAL 0 4     ALK PHOSPHATAS 129     ALT (SGPT) 66     AST(SGOT) 22     ALBUMIN 2 9     TOTAL PROTEIN 6 6       Summary / No summary entered :      No summary entered  Documents attached :      [address]  Work - Desiree Lord; Enc: [id number] - Image Encounter - [facility] -      (Family Medicine) (Additional Information Document)  (1) CBC/ PLT (NO DIFF) 76JVM4335 12:00AM      Test Name Result Flag Reference   HEMATOCRIT 36 0     HEMOGLOBIN 12 2     MCHC 33 9     MCH 30 8     MCV 91     PLATELET COUNT 666     RBC COUNT 3 97     RDW 14 6     WBC COUNT 10 1     MPV 7 5       Summary / No summary entered :      No summary entered  Documents attached :      [address]  Work - Desireemarky Childress; Enc: [id number] - Image Encounter - [facility] -      (Family Medicine) (Additional Information Document)  (1) CBC/PLT/DIFF 83IFW9830 04:20PM Desiree [facility]     Test Name Result Flag Reference   WBC COUNT 9 1     RBC COUNT 4 71     HEMOGLOBIN 14 4     HEMATOCRIT 42 5       Summary / No summary entered :      No summary entered  Documents attached :      [address]  Work - Desiree Childress; Enc: [id number] - Image Encounter - [facility] -      (Family Medicine) (Additional Information Document)  (1) HEMOGLOBIN A1C 72XOD6159 04:15PM Desiree [facility]     Test Name Result Flag Reference   HEMOGLOBIN A1C 7 4     EST  AVG  GLUCOSE 166       Summary / No summary entered :      No summary entered  Documents attached :      [address]  Work - Desiree Childress;  Enc: [id number] - Image Encounter - Desiree [facility] -      [facility]) (Additional Information Document)  (1) LIPID PANEL, FASTING [id number] 04:15PM Desiree Childress     Test Name Result Flag Reference   CHOLESTEROL 161     HDL,DIRECT 33     LDL CHOLESTEROL CALCULATED 87     TRIGLYCERIDES 204       Summary / No summary entered :      No summary entered  Documents attached :      [address] Work - Lia Monsalve; Enc: [id number] - Image Encounter - Lia Monsalve -      (Family Medicine) (Additional Information Document)  (1) COMPREHENSIVE METABOLIC PANEL 36KBI3039 33:23DQ Lia Monsalve     Test Name Result Flag Reference   GLUCOSE,RANDM 123     SODIUM 137     POTASSIUM 0 8     BLOOD UREA NITROGEN 18     CREATININE 0 81       Summary / No summary entered :      No summary entered  Documents attached :      [address] Work - Lia Monsalve; Enc: [id number] - Image Encounter - Lia Monsalve -      [facility]) (Additional Information Document)  VAS LOWER LIMB VENOUS DUPLEX STUDY, COMPLETE BILATERAL 94Rwe3408 08:08AM EPIC, Provider   Test ordered by: Mike Cardoza     Test Name Result Flag Reference   VAS LOWER LIMB VENOUS DUPLEX STUDY, COMPLETE BILATERAL (Report)     [facility] REPORT   CLINICAL:   Indications: Bilateral Other specified soft tissue disorders [M79 89]  Gradual   onset of calf and ankle swelling, bilaterally  CONCLUSION:   Impression:   RIGHT LOWER LIMB:   No evidence of acute or occlusive deep vein thrombosis  No evidence of superficial thrombophlebitis noted  Doppler evaluation shows a response to augmentation maneuvers  Popliteal, posterior tibial and anterior tibial arterial Doppler waveforms are   biphasic  LEFT LOWER LIMB:   No evidence of acute or occlusive deep vein thrombosis  No evidence of superficial thrombophlebitis noted  Doppler evaluation shows a response to augmentation maneuvers  Popliteal, posterior tibial and anterior tibial arterial Doppler waveforms are   biphasic        SIGNATURE:   Electronically Signed by: Knute Holter, MD on 2016-08-13 12:40:14 PM     (1) TSH 74MCY6729 12:00AM Lia Monsalve     Test Name Result Flag Reference   TSH 1 60       Summary / No summary entered :      No summary entered  Documents attached :      [address] Work - Rod Palumbo; Enc: [id number] - Image Encounter - Rod Palumbo -      [facility]) (Additional Information Document)  (1) PSA (SCREEN) (Dx V76 44 Screen for Prostate Cancer) 98WPF5409 12:00AM Rod Linwood     Test Name Result Flag Reference   PSA 0 49       Summary / No summary entered :      No summary entered  Documents attached :      [address] Work - Rod Palumbo;  Enc: [id number] - Image Encounter - Rod Palumbo -      (Family Medicine) (Additional Information Document)  Signatures   Electronically signed by : ARTHUR Holcomb ; Jan 23 2018 11:34AM EST                       (Author)

## 2018-03-02 DIAGNOSIS — E78.2 MIXED HYPERLIPIDEMIA: ICD-10-CM

## 2018-03-02 DIAGNOSIS — I10 ESSENTIAL HYPERTENSION: Primary | ICD-10-CM

## 2018-03-02 PROCEDURE — 4010F ACE/ARB THERAPY RXD/TAKEN: CPT | Performed by: FAMILY MEDICINE

## 2018-03-02 RX ORDER — LISINOPRIL 20 MG/1
TABLET ORAL
Qty: 90 TABLET | Refills: 3 | Status: SHIPPED | OUTPATIENT
Start: 2018-03-02 | End: 2019-01-31 | Stop reason: SDUPTHER

## 2018-03-02 RX ORDER — ATORVASTATIN CALCIUM 10 MG/1
TABLET, FILM COATED ORAL
Qty: 90 TABLET | Refills: 3 | Status: SHIPPED | OUTPATIENT
Start: 2018-03-02 | End: 2019-02-21 | Stop reason: SDUPTHER

## 2018-03-15 DIAGNOSIS — I87.2 CHRONIC VENOUS INSUFFICIENCY: Primary | ICD-10-CM

## 2018-03-15 RX ORDER — FUROSEMIDE 20 MG/1
TABLET ORAL
Qty: 180 TABLET | Refills: 3 | Status: SHIPPED | OUTPATIENT
Start: 2018-03-15 | End: 2019-02-21 | Stop reason: SDUPTHER

## 2018-07-02 DIAGNOSIS — K76.0 FATTY (CHANGE OF) LIVER, NOT ELSEWHERE CLASSIFIED: ICD-10-CM

## 2018-07-02 DIAGNOSIS — K21.9 GASTRO-ESOPHAGEAL REFLUX DISEASE WITHOUT ESOPHAGITIS: ICD-10-CM

## 2018-07-02 DIAGNOSIS — G47.33 OBSTRUCTIVE SLEEP APNEA: ICD-10-CM

## 2018-07-02 DIAGNOSIS — E78.2 MIXED HYPERLIPIDEMIA: ICD-10-CM

## 2018-07-02 DIAGNOSIS — E11.9 TYPE 2 DIABETES MELLITUS WITHOUT COMPLICATIONS (HCC): ICD-10-CM

## 2018-07-02 DIAGNOSIS — I10 ESSENTIAL (PRIMARY) HYPERTENSION: ICD-10-CM

## 2018-07-07 LAB — HBA1C MFR BLD HPLC: 6.6 %

## 2018-07-23 RX ORDER — DIPHENOXYLATE HYDROCHLORIDE AND ATROPINE SULFATE 2.5; .025 MG/1; MG/1
1 TABLET ORAL
COMMUNITY

## 2018-07-23 RX ORDER — OMEPRAZOLE 40 MG/1
40 CAPSULE, DELAYED RELEASE ORAL DAILY
Refills: 3 | COMMUNITY
Start: 2018-05-30 | End: 2018-11-23 | Stop reason: SDUPTHER

## 2018-07-23 RX ORDER — ASCORBATE CALCIUM 500 MG
500 TABLET ORAL DAILY
COMMUNITY
End: 2019-08-21 | Stop reason: ALTCHOICE

## 2018-07-23 RX ORDER — LIFITEGRAST 50 MG/ML
SOLUTION/ DROPS OPHTHALMIC
Refills: 12 | COMMUNITY
Start: 2018-07-10 | End: 2019-02-21 | Stop reason: ALTCHOICE

## 2018-07-23 RX ORDER — SILDENAFIL 100 MG/1
TABLET, FILM COATED ORAL
COMMUNITY
Start: 2013-10-01 | End: 2018-07-24 | Stop reason: ALTCHOICE

## 2018-07-23 RX ORDER — PREDNISOLONE ACETATE 10 MG/ML
SUSPENSION/ DROPS OPHTHALMIC
Refills: 1 | COMMUNITY
Start: 2018-06-01 | End: 2019-02-21 | Stop reason: ALTCHOICE

## 2018-07-23 RX ORDER — SALICYLIC ACID 40 %
81 ADHESIVE PATCH, MEDICATED TOPICAL DAILY
Refills: 3 | COMMUNITY
Start: 2018-04-19 | End: 2019-02-21 | Stop reason: ALTCHOICE

## 2018-07-23 NOTE — PROGRESS NOTES
Assessment/Plan:         Diagnoses and all orders for this visit:    Bilateral pulmonary embolism (HCC)  -     VAS lower limb venous duplex study, complete bilateral; Future  -     Factor V(Leiden) Mutation Analysis W/Rfl Hr2 Mut; Future  -     Prothrombin gene mutation; Future    Type 2 diabetes mellitus without complication, without long-term current use of insulin (HCC)  -     Hemoglobin A1C  -     Microalbumin / creatinine urine ratio    Mixed hyperlipidemia  -     Lipid panel    Benign essential hypertension  -     CBC and differential  -     Comprehensive metabolic panel    Gastroesophageal reflux disease without esophagitis    Obstructive sleep apnea    Pericardial effusion    Hydronephrosis, left    Other orders  -     apixaban (ELIQUIS) 5 mg; Take 5 mg by mouth 2 (two) times a day  -     Calcium Ascorbate 500 MG TABS; Take 500 mg by mouth daily  -     [employer] ASPIRIN LOW DOSE 81 MG EC tablet; Take 81 mg by mouth daily  -     Omega-3 Fatty Acids (FISH OIL PO); Take 1 g by mouth daily  -     XIIDRA 5 % op solution; TAKE 1 DROPPERETTE, SINGLE-USE DROP DISPENSER OPHTHALMICALLY TWICE A DAY INTO BOTH EYES  -     multivitamin (THERAGRAN) TABS; Take 1 tablet by mouth  -     omeprazole (PriLOSEC) 40 MG capsule; Take 40 mg by mouth daily  -     prednisoLONE acetate (PRED FORTE) 1 % ophthalmic suspension; TAKE 1 DROP OPHTHALMICALLY IN BOTH EYES 4 TIMES A DAY THIS REPLACES DUREZOL  -     Discontinue: sildenafil (VIAGRA) 100 mg tablet; Take by mouth        continue with current medications  Eliquis x 6 months  Check venous Dopplers lower extremities  Additional labs to include Factor 5 Leiden and prothrombin mutation gene  He is scheduled for a repeat colonoscopy  Defer for now  OV 6 months  Patient ID: Michalene Shone is a 57 y o  male  Followup visit  medications reviewed  type 2 DM diet controlled  A1c 07/2018 6 5 01 2018 urine microalbumin 2 4  on ACE  no DPN   current with eye exam  hypertension blood pressures stable on current regimen of Lisinopril 10 mg daily  07/2018 creatinine 1 00  electrolytes normal except for Na+ 134 and K+ 5 5  Hgb 13  6  hyperlipidemia mixed type on Atorvastatin 10 mg daily and fish oils  07/2018 Lipid profile cholesterol 127  Triglycerides 138 decreased from 237  HDL 29  LDL 70  LFTs normal   01/2018 TSH 1 30  S/p admission 06/2018 for bilateral pulmonary emboli  Venous Dopplers were not done in the hospital   PE unprovoked with no history of recent travel  no hospitalizations or or surgeries  No family history of DVT/PE  No personal history of DVT or pulmonary embolus  No cancer history  Colonoscopy 04/2013  PSA 01/2018 PSA 0 59  Workup in the hospital chest x-ray small area of infiltrate/atelectasis left lung base and small left effusion  Probable minimal right pleural effusion  CT scan abdomen and pelvis without contrast similar mild to moderate left hydronephrosis  left renal pelvic calculi seen  Small bilateral effusions and bilateral dependent subsegmental atelectasis  Normal liver, spleen and pancreas  No lymphadenopathy  CT scan of chest showed bilateral pulmonary emboli left greater than right in the lower lobes  Pleural effusions left greater than right  Basal atelectasis  Possibly small left lower lobe pulmonary infarct  No evidence of right cardiac pressure overload  Mild pericardial effusion  Echocardiogram normal left ventricular chamber size normal left ventricular systolic function  No regional wall motion abnormalities  Mild concentric LV H  EF 60%  Moderate tricuspid regurgitation  Normal pulmonary artery systolic blood pressure  Mild diastolic dysfunction with normal filling pressures  Small pericardial effusion without evidence of hemodynamic compromise    EKG normal sinus rhythm no acute changes        The following portions of the patient's history were reviewed and updated as appropriate: allergies, current medications, past family history, past medical history, past social history, past surgical history and problem list     Review of Systems   Constitutional: Negative for appetite change, chills, fever and unexpected weight change  6 lb weight loss from 01/2018 with dieting   HENT: Negative for congestion, ear pain, rhinorrhea, sore throat, trouble swallowing and voice change  Eyes: Negative for visual disturbance  Respiratory: Negative for cough, shortness of breath and wheezing  OSMAN wearing CPAP  Cardiovascular: Negative for chest pain, palpitations and leg swelling  edema with prior cardiology evaluation  he was started on Furosemide 40 mg daily  venous dopplers no DVT  not on Ca++ channel blocker  no NSAIDs ,   Gastrointestinal: Negative for abdominal pain, blood in stool, constipation, diarrhea, nausea and vomiting  GERD stable on Omeprazole 40 mg daily  No dysphagia  07/2017 for acute pancreatitis with prior episode of acute gallstone pancreatitis 08/2016  admission abdominal u/s + cholelithiasis without evidence of cholecystitis  mild hepatic steatosis  CT abdomen and pelvis showed without evidence of biliary obstruction or cholecystitis  laura pancreatic edema indicative of acute edematous pancreatitis  no necrosis or fluid collection  MRI/MRCP numerous gallstones  minimal pericholecystic fluid  top normal CBD  no evidence of stones  patient underwent laparoscopic cholecystectomy with intra operative cholangiogram  admission 08/2016 for acute gallstone pancreatitis with elevated LFTs  Admission testing CBC WBC 17,500  Hemoglobin 14 3  Platelet count 441,987  Lipase 49,945  Chemistry profile random blood glucose 227 creatinine 1 1  Electrolytes normal except for calcium 8 3  Albumin low at 3 1      Normal total bilirubin and alkaline phosphatase  Troponin less than 0 02  NT pro BNP 34  lipid profile normal  TGs 120  chest x ray no active disease  EKG normal sinus rhythm   low voltage no acute changes  abdominal ultrasound fatty liver  Multiple gallstones as well as sludge  Spleen normal  intra and extrahepatic ducts normal common bile duct 5 mm  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  Trace amount of hydronephrosis left kidney  No renal masses  CT scan chest, abdomen and pelvis no pulmonary embolus  atelectatic changes posterior lungs  Small to moderate size pericardial effusion  Peripancreatic edema/fluid  Postsurgical distortion right kidney  9 mm calculus in the lower pole left kidney  Genitourinary: Negative for difficulty urinating  01/2018 PSA 0 59   Musculoskeletal: Negative for arthralgias and myalgias  Skin: Negative for rash  Allergic/Immunologic: Negative for environmental allergies  Neurological: Negative for dizziness, weakness and headaches  Hematological: Negative for adenopathy  Does not bruise/bleed easily  Psychiatric/Behavioral: Negative for dysphoric mood and sleep disturbance  Objective:      /72 (BP Location: Left arm, Patient Position: Sitting, Cuff Size: Large)   Pulse 68   Temp 97 5 °F (36 4 °C) (Tympanic)   Resp 18   Wt 109 kg (240 lb 9 6 oz)   BMI 35 53 kg/m²          Physical Exam   Constitutional: He is oriented to person, place, and time  He appears well-developed and well-nourished  No distress  HENT:   Right Ear: Tympanic membrane normal    Left Ear: Tympanic membrane normal    Mouth/Throat: Oropharynx is clear and moist    Eyes: Conjunctivae and EOM are normal  Pupils are equal, round, and reactive to light  No scleral icterus  Neck: Normal range of motion  No JVD present  Carotid bruit is not present  No tracheal deviation present  No thyroid mass and no thyromegaly present  Cardiovascular: Normal rate, regular rhythm and normal heart sounds  Exam reveals no gallop  No murmur heard    Pulses:       Carotid pulses are 2+ on the right side, and 2+ on the left side   Pulmonary/Chest: Effort normal and breath sounds normal  No respiratory distress  He has no wheezes  He has no rales  Abdominal: Soft  Bowel sounds are normal  He exhibits no distension and no mass  There is no hepatosplenomegaly  There is no tenderness  There is no rebound and no guarding  Small umbilical hernia   Musculoskeletal: Normal range of motion  He exhibits no edema  No clubbing or cyanosis   Lymphadenopathy:     He has no cervical adenopathy  Neurological: He is alert and oriented to person, place, and time  He has normal reflexes  No cranial nerve deficit  Skin: No rash noted  Psychiatric: He has a normal mood and affect  Nursing note and vitals reviewed

## 2018-07-24 ENCOUNTER — OFFICE VISIT (OUTPATIENT)
Dept: FAMILY MEDICINE CLINIC | Facility: CLINIC | Age: 63
End: 2018-07-24
Payer: COMMERCIAL

## 2018-07-24 ENCOUNTER — APPOINTMENT (OUTPATIENT)
Dept: LAB | Facility: CLINIC | Age: 63
End: 2018-07-24
Payer: COMMERCIAL

## 2018-07-24 VITALS
SYSTOLIC BLOOD PRESSURE: 128 MMHG | DIASTOLIC BLOOD PRESSURE: 72 MMHG | RESPIRATION RATE: 18 BRPM | HEART RATE: 68 BPM | BODY MASS INDEX: 35.53 KG/M2 | TEMPERATURE: 97.5 F | WEIGHT: 240.6 LBS

## 2018-07-24 DIAGNOSIS — K21.9 GASTROESOPHAGEAL REFLUX DISEASE WITHOUT ESOPHAGITIS: ICD-10-CM

## 2018-07-24 DIAGNOSIS — I26.99 BILATERAL PULMONARY EMBOLISM (HCC): Primary | ICD-10-CM

## 2018-07-24 DIAGNOSIS — I10 BENIGN ESSENTIAL HYPERTENSION: ICD-10-CM

## 2018-07-24 DIAGNOSIS — I26.99 BILATERAL PULMONARY EMBOLISM (HCC): ICD-10-CM

## 2018-07-24 DIAGNOSIS — E11.9 TYPE 2 DIABETES MELLITUS WITHOUT COMPLICATION, WITHOUT LONG-TERM CURRENT USE OF INSULIN (HCC): ICD-10-CM

## 2018-07-24 DIAGNOSIS — E78.2 MIXED HYPERLIPIDEMIA: ICD-10-CM

## 2018-07-24 DIAGNOSIS — G47.33 OBSTRUCTIVE SLEEP APNEA: ICD-10-CM

## 2018-07-24 DIAGNOSIS — N13.30 HYDRONEPHROSIS, LEFT: ICD-10-CM

## 2018-07-24 DIAGNOSIS — I31.3 PERICARDIAL EFFUSION: ICD-10-CM

## 2018-07-24 PROBLEM — B37.2 CANDIDAL INTERTRIGO: Status: ACTIVE | Noted: 2017-07-20

## 2018-07-24 PROBLEM — K80.20 CHOLELITHIASIS: Status: RESOLVED | Noted: 2017-07-20 | Resolved: 2018-07-24

## 2018-07-24 PROBLEM — K85.90 ACUTE PANCREATITIS: Status: RESOLVED | Noted: 2017-07-20 | Resolved: 2018-07-24

## 2018-07-24 PROBLEM — I50.32 CHRONIC DIASTOLIC CHF (CONGESTIVE HEART FAILURE) (HCC): Status: ACTIVE | Noted: 2017-07-02

## 2018-07-24 PROBLEM — B37.2 CANDIDAL INTERTRIGO: Status: RESOLVED | Noted: 2017-07-20 | Resolved: 2018-07-24

## 2018-07-24 PROBLEM — K85.90 ACUTE PANCREATITIS: Status: ACTIVE | Noted: 2017-07-20

## 2018-07-24 PROBLEM — K80.20 CHOLELITHIASIS: Status: ACTIVE | Noted: 2017-07-20

## 2018-07-24 PROCEDURE — 81241 F5 GENE: CPT

## 2018-07-24 PROCEDURE — 36415 COLL VENOUS BLD VENIPUNCTURE: CPT

## 2018-07-24 PROCEDURE — 81240 F2 GENE: CPT

## 2018-07-24 PROCEDURE — 3078F DIAST BP <80 MM HG: CPT | Performed by: FAMILY MEDICINE

## 2018-07-24 PROCEDURE — 99215 OFFICE O/P EST HI 40 MIN: CPT | Performed by: FAMILY MEDICINE

## 2018-07-24 PROCEDURE — 3074F SYST BP LT 130 MM HG: CPT | Performed by: FAMILY MEDICINE

## 2018-07-30 LAB
F2 GENE MUT ANL BLD/T: NORMAL
MISCELLANEOUS LAB TEST RESULT: NORMAL

## 2018-07-31 ENCOUNTER — TELEPHONE (OUTPATIENT)
Dept: FAMILY MEDICINE CLINIC | Facility: CLINIC | Age: 63
End: 2018-07-31

## 2018-07-31 NOTE — TELEPHONE ENCOUNTER
Call re: labs  Testing for abnormal clotting problems all normal this included tests for Factor V Leiden and pro thrombin mutation gene  No additional testing at this time   He need to complete 6 months of blood thinner for blood clot in his lung

## 2018-08-01 NOTE — TELEPHONE ENCOUNTER
Patient's wife notified pt will continue on blood thinners for 6 months  She will schedule pt for venous doppler

## 2018-08-03 DIAGNOSIS — I26.99 OTHER ACUTE PULMONARY EMBOLISM WITHOUT ACUTE COR PULMONALE (HCC): Primary | ICD-10-CM

## 2018-11-23 DIAGNOSIS — K21.9 GASTROESOPHAGEAL REFLUX DISEASE WITHOUT ESOPHAGITIS: Primary | ICD-10-CM

## 2018-11-23 RX ORDER — OMEPRAZOLE 40 MG/1
CAPSULE, DELAYED RELEASE ORAL
Qty: 90 CAPSULE | Refills: 3 | Status: SHIPPED | OUTPATIENT
Start: 2018-11-23 | End: 2019-02-21 | Stop reason: ALTCHOICE

## 2019-01-31 DIAGNOSIS — I10 ESSENTIAL HYPERTENSION: ICD-10-CM

## 2019-01-31 RX ORDER — LISINOPRIL 20 MG/1
TABLET ORAL
Qty: 90 TABLET | Refills: 3 | Status: SHIPPED | OUTPATIENT
Start: 2019-01-31 | End: 2019-08-21 | Stop reason: SDUPTHER

## 2019-02-05 LAB
CREAT ?TM UR-SCNC: 169 UMOL/L
EXT MICROALBUMIN URINE RANDOM: 2.2
HBA1C MFR BLD HPLC: 6.4 %
MICROALBUMIN/CREAT UR: 13 MG/G{CREAT}

## 2019-02-21 ENCOUNTER — OFFICE VISIT (OUTPATIENT)
Dept: FAMILY MEDICINE CLINIC | Facility: CLINIC | Age: 64
End: 2019-02-21
Payer: MEDICARE

## 2019-02-21 VITALS
DIASTOLIC BLOOD PRESSURE: 58 MMHG | HEIGHT: 68 IN | RESPIRATION RATE: 16 BRPM | SYSTOLIC BLOOD PRESSURE: 120 MMHG | BODY MASS INDEX: 36.07 KG/M2 | HEART RATE: 68 BPM | TEMPERATURE: 97.4 F | OXYGEN SATURATION: 95 % | WEIGHT: 238 LBS

## 2019-02-21 DIAGNOSIS — I26.99 OTHER ACUTE PULMONARY EMBOLISM WITHOUT ACUTE COR PULMONALE (HCC): ICD-10-CM

## 2019-02-21 DIAGNOSIS — N52.9 ERECTILE DYSFUNCTION, UNSPECIFIED ERECTILE DYSFUNCTION TYPE: ICD-10-CM

## 2019-02-21 DIAGNOSIS — E66.9 OBESITY (BMI 30-39.9): ICD-10-CM

## 2019-02-21 DIAGNOSIS — Z72.0 TOBACCO ABUSE: ICD-10-CM

## 2019-02-21 DIAGNOSIS — I87.2 CHRONIC VENOUS INSUFFICIENCY: ICD-10-CM

## 2019-02-21 DIAGNOSIS — K21.9 GASTROESOPHAGEAL REFLUX DISEASE WITHOUT ESOPHAGITIS: ICD-10-CM

## 2019-02-21 DIAGNOSIS — I10 BENIGN ESSENTIAL HYPERTENSION: ICD-10-CM

## 2019-02-21 DIAGNOSIS — N13.30 HYDRONEPHROSIS, LEFT: ICD-10-CM

## 2019-02-21 DIAGNOSIS — E11.9 TYPE 2 DIABETES MELLITUS WITHOUT COMPLICATION, WITHOUT LONG-TERM CURRENT USE OF INSULIN (HCC): Primary | ICD-10-CM

## 2019-02-21 DIAGNOSIS — G47.33 OBSTRUCTIVE SLEEP APNEA: ICD-10-CM

## 2019-02-21 DIAGNOSIS — E78.2 MIXED HYPERLIPIDEMIA: ICD-10-CM

## 2019-02-21 PROBLEM — N20.0 RENAL CALCULUS: Status: ACTIVE | Noted: 2018-06-10

## 2019-02-21 PROBLEM — K85.90 PANCREATITIS, ACUTE: Status: RESOLVED | Noted: 2017-07-02 | Resolved: 2019-02-21

## 2019-02-21 PROBLEM — K85.90 PANCREATITIS, ACUTE: Status: ACTIVE | Noted: 2017-07-02

## 2019-02-21 PROCEDURE — 99215 OFFICE O/P EST HI 40 MIN: CPT | Performed by: FAMILY MEDICINE

## 2019-02-21 RX ORDER — ATORVASTATIN CALCIUM 10 MG/1
TABLET, FILM COATED ORAL
Qty: 90 TABLET | Refills: 3 | Status: SHIPPED | OUTPATIENT
Start: 2019-02-21 | End: 2019-08-21 | Stop reason: SDUPTHER

## 2019-02-21 RX ORDER — FUROSEMIDE 20 MG/1
TABLET ORAL
Qty: 180 TABLET | Refills: 3 | Status: SHIPPED | OUTPATIENT
Start: 2019-02-21 | End: 2019-02-21 | Stop reason: ALTCHOICE

## 2019-02-21 NOTE — PROGRESS NOTES
Assessment/Plan:       Diagnoses and all orders for this visit:    Type 2 diabetes mellitus without complication, without long-term current use of insulin (HCC)  -     Hemoglobin A1C    Mixed hyperlipidemia  -     Lipid panel  -     TSH, 3rd generation with Free T4 reflex    Benign essential hypertension  -     CBC and differential  -     Comprehensive metabolic panel    Gastroesophageal reflux disease without esophagitis    Obstructive sleep apnea    Erectile dysfunction, unspecified erectile dysfunction type  -     Ambulatory referral to Urology; Future    Tobacco abuse    Hydronephrosis, left    Other acute pulmonary embolism without acute cor pulmonale (HCC)  -     D-dimer, quantitative; Future    Obesity (BMI 30-39  9)        Continue with current medications  Start aspirin 81 mg daily  Office visit in 6 months with repeat labs at that time  Patient is not interested in flu vaccine or pneumococcal vaccines  Urology referral for ED       BMI Counseling: Body mass index is 36 19 kg/m²  Discussed the patient's BMI with him  The BMI is above average  BMI counseling and education was provided to the patient  Nutrition recommendations include reducing portion sizes, decreasing overall calorie intake, consuming healthier snacks, moderation in carbohydrate intake, reducing intake of saturated fat and trans fat and reducing intake of cholesterol  Exercise recommendations include exercising 3-5 times per week  Patient ID: Deepali Mendiola is a 70 y o  male  Followup visit  medications reviewed  type 2 DM diet controlled  02/2019 FBS 96  A1c  6 4   urine microalbumin 2 2  on ACE  no DPN  current with eye exam  hypertension blood pressures have been stable on Lisinopril 10 mg daily  02/2019 creatinine 1 03  electrolytes normal  Hgb 14 0   hyperlipidemia mixed type on Atorvastatin 10 mg daily and fish oils  Lipid profile 02/2019 cholesterol 119  Triglycerides 160 decreased from 237 in the past   HDL 36  LDL 83   LFTs normal   01/2018 TSH 1 30  S/p admission 06/2018 for bilateral pulmonary emboli  Venous Dopplers were not done in the hospital   PE unprovoked with no history of recent travel  no hospitalizations or or surgeries  No family history of DVT/PE  No personal history of DVT or pulmonary embolus  No cancer history  Colonoscopy 04/2013  PSA 01/2018 PSA 0 59  Workup in the hospital chest x-ray small area of infiltrate/atelectasis left lung base and small left effusion  Probable minimal right pleural effusion  CT scan abdomen and pelvis without contrast similar mild to moderate left hydronephrosis  left renal pelvic calculi seen  Small bilateral effusions and bilateral dependent subsegmental atelectasis  Normal liver, spleen and pancreas  No lymphadenopathy  CT scan of chest showed bilateral pulmonary emboli left greater than right in the lower lobes  Pleural effusions left greater than right  Basal atelectasis  Possibly small left lower lobe pulmonary infarct  No evidence of right cardiac pressure overload  Mild pericardial effusion  Echocardiogram normal left ventricular chamber size normal left ventricular systolic function  No regional wall motion abnormalities  Mild concentric LV H  EF 60%  Moderate tricuspid regurgitation  Normal pulmonary artery systolic blood pressure  Mild diastolic dysfunction with normal filling pressures  Small pericardial effusion without evidence of hemodynamic compromise  EKG normal sinus rhythm no acute changes      The following portions of the patient's history were reviewed and updated as appropriate: allergies, current medications, past family history, past medical history, past social history, past surgical history and problem list     Review of Systems   Constitutional: Negative for appetite change, chills, fever and unexpected weight change            6 lb weight loss from 01/2018 with dieting   HENT: Negative for congestion, ear pain, rhinorrhea, sore throat, trouble swallowing and voice change  Eyes: Negative for visual disturbance  Respiratory: Negative for cough, shortness of breath and wheezing  OSMAN wearing CPAP   S/p admission 06/2018 for bilateral pulmonary emboli  Venous Dopplers were not done in the hospital   PE unprovoked with no history of recent travel  no hospitalizations or surgeries  No family history of DVT/PE  No personal history of DVT or pulmonary embolus  No cancer history  Colonoscopy 04/2013  PSA 01/2018 PSA 0 59  Workup in the hospital chest x-ray small area of infiltrate/atelectasis left lung base and small left effusion  Probable minimal right pleural effusion  CT scan abdomen and pelvis without contrast similar mild to moderate left hydronephrosis  left renal pelvic calculi seen  Small bilateral effusions and bilateral dependent subsegmental atelectasis  Normal liver, spleen and pancreas  No lymphadenopathy  CT scan of chest showed bilateral pulmonary emboli left greater than right in the lower lobes  Pleural effusions left greater than right  Basal atelectasis  Possibly small left lower lobe pulmonary infarct  No evidence of right cardiac pressure overload  Mild pericardial effusion  Echocardiogram normal left ventricular chamber size normal left ventricular systolic function  No regional wall motion abnormalities  Mild concentric LV H  EF 60%  Moderate tricuspid regurgitation  Normal pulmonary artery systolic blood pressure  Mild diastolic dysfunction with normal filling pressures  Small pericardial effusion without evidence of hemodynamic compromise  EKG normal sinus rhythm no acute changes  Patient completed 6 months of Eliquis  07/2018 prothrombin mutation gene and Factor V Leidin negative  Cardiovascular: Positive for leg swelling  Negative for chest pain and palpitations  edema with prior cardiology evaluation  No longer on Furosemide 40 mg daily  venous dopplers 08/2016  no DVT   not on Ca++ channel blocker  no NSAIDs ,   Gastrointestinal: Negative for abdominal pain, blood in stool, constipation, diarrhea, nausea and vomiting  GERD stable off Omeprazole 40 mg daily  No dysphagia  07/2017 for acute pancreatitis with prior episode of acute gallstone pancreatitis in 08/2016  admission abdominal u/s + cholelithiasis without evidence of cholecystitis  mild hepatic steatosis  CT abdomen and pelvis showed cholelithiasis without evidence of biliary obstruction or cholecystitis  laura pancreatic edema indicative of acute edematous pancreatitis  no necrosis or fluid collection  MRI/MRCP numerous gallstones  minimal pericholecystic fluid  top normal CBD  no evidence of stones  patient underwent laparoscopic cholecystectomy with intra operative cholangiogram  admission 08/2016 for acute gallstone pancreatitis with elevated LFTs  Admission testing CBC WBC 17,500  Hemoglobin 14 3  Platelet count 952,705  Lipase 49,945  Chemistry profile random blood glucose 227 creatinine 1 1  Electrolytes normal except for calcium 8 3  Albumin low at 3 1      Normal total bilirubin and alkaline phosphatase  Troponin less than 0 02  NT pro BNP 34  lipid profile normal  TGs 120  chest x ray no active disease  EKG normal sinus rhythm  low voltage no acute changes  abdominal ultrasound fatty liver  Multiple gallstones as well as sludge  Spleen normal  intra and extrahepatic ducts normal common bile duct 5 mm  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  Trace amount of hydronephrosis left kidney  No renal masses  CT scan chest, abdomen and pelvis no pulmonary embolus  atelectatic changes posterior lungs  Small to moderate size pericardial effusion  Peripancreatic edema/fluid  Postsurgical distortion right kidney  9 mm calculus in the lower pole left kidney  Genitourinary: Negative for difficulty urinating  01/2018 PSA 0 59  + ED no effect with Viagra  Musculoskeletal: Negative for arthralgias and myalgias  Skin: Negative for rash  Allergic/Immunologic: Negative for environmental allergies  Neurological: Negative for dizziness, weakness and headaches  Hematological: Negative for adenopathy  Does not bruise/bleed easily  Psychiatric/Behavioral: Negative for dysphoric mood and sleep disturbance  Objective:      /58   Pulse 68   Temp (!) 97 4 °F (36 3 °C)   Resp 16   Ht 5' 8" (1 727 m)   Wt 108 kg (238 lb)   SpO2 95%   BMI 36 19 kg/m²          Physical Exam   Constitutional: He is oriented to person, place, and time  He appears well-developed and well-nourished  No distress  HENT:   Right Ear: Tympanic membrane normal    Left Ear: Tympanic membrane normal    Mouth/Throat: Oropharynx is clear and moist    Eyes: Pupils are equal, round, and reactive to light  Conjunctivae and EOM are normal  No scleral icterus  Neck: Normal range of motion  Neck supple  No JVD present  Carotid bruit is not present  No tracheal deviation present  No thyroid mass and no thyromegaly present  Cardiovascular: Normal rate, regular rhythm, normal heart sounds and intact distal pulses  Exam reveals no gallop  Pulses are no weak pulses  No murmur heard  Pulses:       Carotid pulses are 2+ on the right side, and 2+ on the left side  Dorsalis pedis pulses are 2+ on the right side, and 2+ on the left side  Posterior tibial pulses are 2+ on the right side, and 2+ on the left side  Pulmonary/Chest: Effort normal and breath sounds normal  No respiratory distress  He has no wheezes  He has no rales  Abdominal: Soft  Bowel sounds are normal  He exhibits no distension, no pulsatile midline mass and no mass  There is no hepatosplenomegaly  There is no tenderness  There is no rebound and no guarding  Musculoskeletal: He exhibits edema  Trace pretibial edema with mild stasis pigmentation changes bilaterally  No calf tenderness or Homans sign  Feet:   Right Foot:   Skin Integrity: Negative for ulcer, skin breakdown, erythema, warmth, callus or dry skin  Left Foot:   Skin Integrity: Negative for ulcer, skin breakdown, erythema, warmth, callus or dry skin  Lymphadenopathy:     He has no cervical adenopathy  Neurological: He is alert and oriented to person, place, and time  He has normal reflexes  No cranial nerve deficit  Skin: No rash noted  Psychiatric: He has a normal mood and affect  Nursing note and vitals reviewed  Patient's shoes and socks removed  Right Foot/Ankle   Right Foot Inspection  Skin Exam: skin normal and skin intact no dry skin, no warmth, no callus, no erythema, no maceration, no abnormal color, no pre-ulcer, no ulcer and no callus                          Toe Exam: ROM and strength within normal limits  Sensory       Monofilament testing: intact  Vascular  Capillary refills: < 3 seconds  The right DP pulse is 2+  The right PT pulse is 2+  Left Foot/Ankle  Left Foot Inspection  Skin Exam: skin normal and skin intactno dry skin, no warmth, no erythema, no maceration, normal color, no pre-ulcer, no ulcer and no callus                         Toe Exam: ROM and strength within normal limits                   Sensory       Monofilament: intact  Vascular  Capillary refills: < 3 seconds  The left DP pulse is 2+  The left PT pulse is 2+  Assign Risk Category:  No deformity present; No loss of protective sensation; No weak pulses       Risk: 0        Recent Results (from the past 1008 hour(s))   Hemoglobin A1C    Collection Time: 02/05/19 12:00 AM   Result Value Ref Range    Hemoglobin A1C 6 4    Microalbumin / creatinine urine ratio    Collection Time: 02/05/19 12:00 AM   Result Value Ref Range    EXT Creatinine Urine 169 0     Microalbum  ,U,Random 2 2     EXTERNAL Microalb/Creat Ratio 13 0

## 2019-08-05 LAB
LEFT EYE DIABETIC RETINOPATHY: NORMAL
RIGHT EYE DIABETIC RETINOPATHY: NORMAL

## 2019-08-06 LAB — HBA1C MFR BLD HPLC: 6.4 %

## 2019-08-21 ENCOUNTER — OFFICE VISIT (OUTPATIENT)
Dept: FAMILY MEDICINE CLINIC | Facility: CLINIC | Age: 64
End: 2019-08-21
Payer: MEDICARE

## 2019-08-21 VITALS
BODY MASS INDEX: 33.93 KG/M2 | TEMPERATURE: 99.2 F | SYSTOLIC BLOOD PRESSURE: 118 MMHG | WEIGHT: 237 LBS | HEIGHT: 70 IN | RESPIRATION RATE: 16 BRPM | DIASTOLIC BLOOD PRESSURE: 64 MMHG | HEART RATE: 72 BPM

## 2019-08-21 DIAGNOSIS — Z00.00 WELCOME TO MEDICARE PREVENTIVE VISIT: ICD-10-CM

## 2019-08-21 DIAGNOSIS — I10 BENIGN ESSENTIAL HYPERTENSION: ICD-10-CM

## 2019-08-21 DIAGNOSIS — E78.2 MIXED HYPERLIPIDEMIA: ICD-10-CM

## 2019-08-21 DIAGNOSIS — E11.9 TYPE 2 DIABETES MELLITUS WITHOUT COMPLICATION, WITHOUT LONG-TERM CURRENT USE OF INSULIN (HCC): Primary | ICD-10-CM

## 2019-08-21 DIAGNOSIS — Z72.0 TOBACCO ABUSE: ICD-10-CM

## 2019-08-21 DIAGNOSIS — K76.0 FATTY LIVER: ICD-10-CM

## 2019-08-21 DIAGNOSIS — G47.33 OBSTRUCTIVE SLEEP APNEA: ICD-10-CM

## 2019-08-21 DIAGNOSIS — Z23 NEED FOR PNEUMOCOCCAL VACCINATION: ICD-10-CM

## 2019-08-21 DIAGNOSIS — Z12.5 SCREENING FOR PROSTATE CANCER: ICD-10-CM

## 2019-08-21 PROBLEM — I26.99 PULMONARY EMBOLI (HCC): Status: RESOLVED | Noted: 2018-06-10 | Resolved: 2019-08-21

## 2019-08-21 PROBLEM — I50.32 CHRONIC DIASTOLIC CHF (CONGESTIVE HEART FAILURE) (HCC): Status: RESOLVED | Noted: 2017-07-02 | Resolved: 2019-08-21

## 2019-08-21 PROCEDURE — 99214 OFFICE O/P EST MOD 30 MIN: CPT | Performed by: FAMILY MEDICINE

## 2019-08-21 PROCEDURE — G0402 INITIAL PREVENTIVE EXAM: HCPCS | Performed by: FAMILY MEDICINE

## 2019-08-21 PROCEDURE — G0009 ADMIN PNEUMOCOCCAL VACCINE: HCPCS

## 2019-08-21 PROCEDURE — 90670 PCV13 VACCINE IM: CPT

## 2019-08-21 RX ORDER — ATORVASTATIN CALCIUM 10 MG/1
10 TABLET, FILM COATED ORAL DAILY
Qty: 90 TABLET | Refills: 3 | Status: SHIPPED | OUTPATIENT
Start: 2019-08-21 | End: 2020-09-02 | Stop reason: SDUPTHER

## 2019-08-21 RX ORDER — ATORVASTATIN CALCIUM 10 MG/1
10 TABLET, FILM COATED ORAL DAILY
Qty: 90 TABLET | Refills: 3 | Status: SHIPPED | OUTPATIENT
Start: 2019-08-21 | End: 2019-08-21 | Stop reason: SDUPTHER

## 2019-08-21 RX ORDER — LISINOPRIL 20 MG/1
20 TABLET ORAL DAILY
Qty: 90 TABLET | Refills: 3 | Status: SHIPPED | OUTPATIENT
Start: 2019-08-21 | End: 2020-09-02 | Stop reason: SDUPTHER

## 2019-08-21 RX ORDER — LISINOPRIL 20 MG/1
20 TABLET ORAL DAILY
Qty: 90 TABLET | Refills: 3 | Status: SHIPPED | OUTPATIENT
Start: 2019-08-21 | End: 2019-08-21 | Stop reason: SDUPTHER

## 2019-08-21 NOTE — PROGRESS NOTES
Assessment and Plan:     Problem List Items Addressed This Visit        Digestive    Fatty liver       Endocrine    Diabetes mellitus, type 2 ([facility] [address] ) - Primary    Relevant Orders    Microalbumin / creatinine urine ratio    Hemoglobin A1C       Respiratory    Obstructive sleep apnea       Cardiovascular and Mediastinum    Benign essential hypertension    Relevant Medications    lisinopril (ZESTRIL) 20 mg tablet    Other Relevant Orders    CBC and differential    Comprehensive metabolic panel       Other    Mixed hyperlipidemia    Relevant Medications    atorvastatin (LIPITOR) 10 mg tablet    Other Relevant Orders    Lipid panel    Tobacco abuse      Other Visit Diagnoses     Welcome to Medicare preventive visit        Screening for prostate cancer        Relevant Orders    PSA, Total Screen    Need for pneumococcal vaccination        Relevant Orders    PNEUMOCOCCAL CONJUGATE VACCINE 13-VALENT GREATER THAN 6 MONTHS (Completed)         History of Present Illness:     Patient presents for Medicare Annual Wellness visit    Patient Care Team:  Kiarra Aggarwal MD as PCP - Meseret Valerio MD (General Surgery)  Negro Croft MD (Colon and Rectal Surgery)    Review of Systems   Constitutional: Negative for appetite change, chills, fatigue, fever and unexpected weight change  HENT: Negative for congestion, ear pain, hearing loss, rhinorrhea, sore throat and trouble swallowing  Eyes: Negative for visual disturbance  Respiratory: Negative for cough, shortness of breath and wheezing  OSMAN he uses CPAP   S/p admission 06/2018 for bilateral pulmonary emboli  Venous Dopplers were not done in the hospital   PE unprovoked with no history of recent travel  no hospitalizations or surgeries  No family history of DVT/PE  No personal history of DVT or pulmonary embolus  No cancer history  Colonoscopy 04/2013  PSA 01/2018 PSA 0 59    Workup in the hospital chest x-ray small area of infiltrate/atelectasis left lung base and small left effusion  Probable minimal right pleural effusion  CT scan abdomen and pelvis without contrast similar mild to moderate left hydronephrosis  left renal pelvic calculi seen  Small bilateral effusions and bilateral dependent subsegmental atelectasis  Normal liver, spleen and pancreas  No lymphadenopathy  CT scan of chest showed bilateral pulmonary emboli left greater than right in the lower lobes  Pleural effusions left greater than right  Basal atelectasis  Possibly small left lower lobe pulmonary infarct  No evidence of right cardiac pressure overload  Mild pericardial effusion  Echocardiogram normal left ventricular chamber size normal left ventricular systolic function  No regional wall motion abnormalities  Mild concentric LV H  EF 60%  Moderate tricuspid regurgitation  Normal pulmonary artery systolic blood pressure  Mild diastolic dysfunction with normal filling pressures  Small pericardial effusion without evidence of hemodynamic compromise  EKG normal sinus rhythm no acute changes  Patient completed 6 months of Eliquis  07/2018 prothrombin mutation gene and Factor V Leidin negative  08/2019 D dimer < 0 27   Cardiovascular: Negative for chest pain, palpitations and leg swelling  Gastrointestinal: Negative for blood in stool, bowel incontinence, constipation, nausea and vomiting  Colonoscopy 04/2013  Endocrine: Negative for polydipsia and polyuria  Genitourinary: Negative for difficulty urinating  01/2018 PSA 0 59  Musculoskeletal: Negative for arthralgias and myalgias  Skin: Negative for rash  Neurological: Negative for dizziness and headaches  Hematological: Negative for adenopathy  Does not bruise/bleed easily  Psychiatric/Behavioral: Negative for dysphoric mood and sleep disturbance  The patient is not nervous/anxious  Physical Exam   Constitutional: He is oriented to person, place, and time   He appears well-developed and well-nourished  No distress  HENT:   Right Ear: External ear normal    Left Ear: External ear normal    Mouth/Throat: Oropharynx is clear and moist    Eyes: Pupils are equal, round, and reactive to light  Conjunctivae and EOM are normal  No scleral icterus  Neck: No JVD present  No tracheal deviation present  No thyromegaly present  Cardiovascular: Normal rate, regular rhythm and normal heart sounds  Exam reveals no gallop  No murmur heard  Pulmonary/Chest: Effort normal and breath sounds normal  No respiratory distress  He has no wheezes  He has no rales  Abdominal: Soft  Bowel sounds are normal  He exhibits no distension and no mass  There is no tenderness  There is no rebound and no guarding  Musculoskeletal: Normal range of motion  He exhibits no edema  Lymphadenopathy:     He has no cervical adenopathy  Neurological: He is alert and oriented to person, place, and time  No cranial nerve deficit  Skin: No rash noted  Psychiatric: He has a normal mood and affect   His behavior is normal  Cognition and memory are normal         Problem List:     Patient Active Problem List   Diagnosis    Benign essential hypertension    Diabetes mellitus, type 2 ([facility] [address] )    GERD (gastroesophageal reflux disease)    Mixed hyperlipidemia    Obstructive sleep apnea    ED (erectile dysfunction)    Fatty liver    Renal calculus    Tobacco abuse      Past Medical and Surgical History:     Past Medical History:   Diagnosis Date    Acute pancreatitis     last assessed 7/20/17    Candidal intertrigo     last assessed 7/20/17    Cholelithiasis     last assessed 7/20/17    Elevated liver enzymes     last assessed 8/29/16    Annika Fus stone pancreatitis 8/23/2016    Hydronephrosis, left 8/25/2016    Impaired fasting glucose     last assessed 1/18/16    Nephrolithiasis     last assessed 3/18/13    Pancreatitis, acute 7/2/2017    Pericardial effusion     last assessed 8/29/16    Pulmonary emboli (HCC) 6/10/2018     Past Surgical History:   Procedure Laterality Date    HEMORRHOID SURGERY      hemorrhoidectomy    KIDNEY SURGERY Right     LAPAROSCOPIC CHOLECYSTECTOMY      S/P,last assessed 7/20/17    LITHOTRIPSY      renal      Family History:     Family History   Problem Relation Age of Onset    Diabetes type II Father       Social History:     Social History     Tobacco Use   Smoking Status Current Every Day Smoker    Packs/day: 0 75   Smokeless Tobacco Never Used     Social History     Substance and Sexual Activity   Alcohol Use No     Social History     Substance and Sexual Activity   Drug Use No      Medications and Allergies:     Current Outpatient Medications   Medication Sig Dispense Refill    atorvastatin (LIPITOR) 10 mg tablet Take 1 tablet (10 mg total) by mouth daily 90 tablet 3    lisinopril (ZESTRIL) 20 mg tablet Take 1 tablet (20 mg total) by mouth daily 90 tablet 3    multivitamin (THERAGRAN) TABS Take 1 tablet by mouth      Omega-3 Fatty Acids (FISH OIL PO) Take 1 g by mouth daily       No current facility-administered medications for this visit  No Known Allergies   Immunizations:     Immunization History   Administered Date(s) Administered    Pneumococcal Conjugate 13-Valent 08/21/2019    Tdap 08/21/2011      Medicare Screening Tests and Risk Assessments:     Bryce Orlando is here for his Welcome to Medicare visit  Health Risk Assessment:  Patient rates overall health as very good  Patient feels that their physical health rating is Same  Eyesight was rated as Same  Hearing was rated as Same  Patient feels that their emotional and mental health rating is Same  Pain experienced by patient in the last 7 days has been None  Patient states that he has experienced no weight loss or gain in last 6 months  Emotional/Mental Health:  Patient has not been feeling nervous/anxious  PHQ-9 Depression Screening:    Frequency of the following problems over the past two weeks:      1   Little interest or pleasure in doing things: 0 - not at all      2  Feeling down, depressed, or hopeless: 0 - not at all  PHQ-2 Score: 0          Broken Bones/Falls: Fall Risk Assessment:    In the past year, patient has experienced: No history of falling in past year          Bladder/Bowel:  Patient has not leaked urine accidently in the last six months  Patient reports no loss of bowel control  Immunizations:  Patient has not had a flu vaccination within the last year  Patient has not received a pneumonia shot  Patient has not received a shingles shot  Patient has received tetanus/diphtheria shot  Date of tetanus/diphtheria shot: 8/11/2011    Home Safety:  Patient does not have trouble with stairs inside or outside of their home  Patient currently reports that there are no safety hazards present in home, working smoke alarms, working carbon monoxide detectors  Preventative Screenings:   prostate cancer screen performed, colon cancer screen completed, cholesterol screen completed, glaucoma eye exam completed, (Additional Comments: PSA 01/2018  Colonoscopy 04/2013  Lipid profile 08/2019  Yearly eye exam)    Nutrition:  Current diet: Regular with servings of the following:    Medications:  Patient is currently taking over-the-counter supplements  List of OTC medications includes: MVI, FISH OIL  Patient is able to manage medications  Lifestyle Choices:  Patient reports current tobacco use  Patient reports no alcohol use  Patient drives a vehicle  Patient wears seat belt      (Additional Comments: Smoker < 1 PPD)    Activities of Daily Living:  Can get out of bed by his or her self, able to dress self, able to make own meals, able to do own shopping, able to bathe self, can do own laundry/housekeeping, can manage own money, pay bills and track expenses    Previous Hospitalizations:  No hospitalization or ED visit in past 12 months        Advanced Directives:  Patient has decided on a power of   Patient has spoken to designated power of   Patient has not completed advanced directive  Preventative Screening/Counseling:      Cardiovascular:      General: Screening Not Indicated      Counseling: Healthy Diet, Healthy Weight and Tobacco Cessation      Comments: ADAN 06/2018        Diabetes:      General: Screening Current          Colorectal Cancer:      General: Screening Current          Prostate Cancer:      Due for labs: PSA          Osteoporosis:      General: Screening Not Indicated          AAA:      General: Risks and Benefits Discussed and Patient Declines          Glaucoma:      General: Screening Current          HIV:      General: Screening Not Indicated          Hepatitis C:      General: Risks and Benefits Discussed and Patient Declines        Advanced Directives:   Patient has no living will for healthcare, does not have durable POA for healthcare, patient does not have an advanced directive  Immunizations:      Influenza: Influenza Recommended Annually      Pneumococcal: Pneumococcal Due Today      Shingrix: Risks & Benefits Discussed and Patient Declines      TDAP: Tdap Vaccine UTD      Other Preventative Counseling (Non-Medicare):   Increase physical activity, Nutrition Counseling and Weight reduction discussed

## 2019-08-21 NOTE — PROGRESS NOTES
Assessment/Plan:         Diagnoses and all orders for this visit:    Type 2 diabetes mellitus without complication, without long-term current use of insulin (HCC)  -     Microalbumin / creatinine urine ratio  -     Hemoglobin A1C    Benign essential hypertension  -     CBC and differential  -     Comprehensive metabolic panel  -     Discontinue: lisinopril (ZESTRIL) 20 mg tablet; Take 1 tablet (20 mg total) by mouth daily  -     lisinopril (ZESTRIL) 20 mg tablet; Take 1 tablet (20 mg total) by mouth daily    Mixed hyperlipidemia  -     Lipid panel  -     Discontinue: atorvastatin (LIPITOR) 10 mg tablet; Take 1 tablet (10 mg total) by mouth daily  -     atorvastatin (LIPITOR) 10 mg tablet; Take 1 tablet (10 mg total) by mouth daily    Fatty liver    Obstructive sleep apnea    Tobacco abuse    Welcome to Medicare preventive visit    Screening for prostate cancer  -     PSA, Total Screen; Future    Need for pneumococcal vaccination  -     PNEUMOCOCCAL CONJUGATE VACCINE 13-VALENT GREATER THAN 6 MONTHS    Other orders  -     Cancel: TD VACCINE GREATER THAN OR EQUAL TO 6YO PRESERVATIVE FREE IM  -     Cancel: Ambulatory referral to Gastroenterology; Future        Continue with current medications  OV 6 months with repeat labs at that time  Prevnar 13 today  Flu vaccine in the fall  He is due for an eye exam       Patient ID: Eloy Hayes is a 70 y o  male  Followup visit  medications reviewed  type 2 DM diet controlled  08/2019 FBS 96  A1c 6 4  02/2019 urine microalbumin 2 2  on ACE  no DPN  current with eye exam  Hypertension blood pressures have been stable on Lisinopril 10 mg daily  08/2019 creatinine 1 17  electrolytes normal  Hgb 14 9  Hyperlipidemia mixed type on Atorvastatin 10 mg daily and 2 fish oils/day   Lipid profile 08/2019 cholesterol 119  Triglycerides 141  HDL 31  LDL 60  LFTs normal   TSH 1 500          The following portions of the patient's history were reviewed and updated as appropriate: allergies, current medications, past family history, past medical history, past social history, past surgical history and problem list     Review of Systems   Constitutional: Negative for appetite change, chills, fatigue, fever and unexpected weight change  HENT: Negative for congestion, ear pain, hearing loss, rhinorrhea, sore throat, trouble swallowing and voice change  Eyes: Negative for visual disturbance  Respiratory: Negative for cough, shortness of breath and wheezing  OSMAN he uses CPAP   S/p admission 06/2018 for bilateral pulmonary emboli  Venous Dopplers were not done in the hospital   PE unprovoked with no history of recent travel  no hospitalizations or surgeries  No family history of DVT/PE  No personal history of DVT or pulmonary embolus  No cancer history  Colonoscopy 04/2013  PSA 01/2018 PSA 0 59  Workup in the hospital chest x-ray small area of infiltrate/atelectasis left lung base and small left effusion  Probable minimal right pleural effusion  CT scan abdomen and pelvis without contrast similar mild to moderate left hydronephrosis  left renal pelvic calculi seen  Small bilateral effusions and bilateral dependent subsegmental atelectasis  Normal liver, spleen and pancreas  No lymphadenopathy  CT scan of chest showed bilateral pulmonary emboli left greater than right in the lower lobes  Pleural effusions left greater than right  Basal atelectasis  Possibly small left lower lobe pulmonary infarct  No evidence of right cardiac pressure overload  Mild pericardial effusion  Echocardiogram normal left ventricular chamber size normal left ventricular systolic function  No regional wall motion abnormalities  Mild concentric LV H  EF 60%  Moderate tricuspid regurgitation  Normal pulmonary artery systolic blood pressure  Mild diastolic dysfunction with normal filling pressures  Small pericardial effusion without evidence of hemodynamic compromise    EKG normal sinus rhythm no acute changes  Patient completed 6 months of Eliquis  07/2018 prothrombin mutation gene and Factor V Leidin negative  08/2019 D dimer < 0 27     Cardiovascular: Positive for leg swelling  Negative for chest pain and palpitations  edema with prior cardiology evaluation  No longer on Furosemide 40 mg daily  venous dopplers 08/2016  no DVT  not on Ca++ channel blocker  no NSAIDs ,   Gastrointestinal: Negative for abdominal pain, blood in stool, constipation, diarrhea, nausea and vomiting  GERD stable off Omeprazole 40 mg daily  No dysphagia  07/2017 for acute pancreatitis with prior episode of acute gallstone pancreatitis in 08/2016  admission abdominal u/s + cholelithiasis without evidence of cholecystitis  mild hepatic steatosis  CT abdomen and pelvis showed cholelithiasis without evidence of biliary obstruction or cholecystitis  laura pancreatic edema indicative of acute edematous pancreatitis  no necrosis or fluid collection  MRI/MRCP numerous gallstones  minimal pericholecystic fluid  top normal CBD  no evidence of stones  patient underwent laparoscopic cholecystectomy with intra operative cholangiogram  admission 08/2016 for acute gallstone pancreatitis with elevated LFTs  Admission testing CBC WBC 17,500  Hemoglobin 14 3  Platelet count 820,576  Lipase 49,945  Chemistry profile random blood glucose 227 creatinine 1 1  Electrolytes normal except for calcium 8 3  Albumin low at 3 1      Normal total bilirubin and alkaline phosphatase  Troponin less than 0 02  NT pro BNP 34  lipid profile normal  TGs 120  chest x ray no active disease  EKG normal sinus rhythm  low voltage no acute changes  abdominal ultrasound fatty liver  Multiple gallstones as well as sludge  Spleen normal  intra and extrahepatic ducts normal common bile duct 5 mm  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  Trace amount of hydronephrosis left kidney  No renal masses   CT scan chest, abdomen and pelvis no pulmonary embolus  atelectatic changes posterior lungs  Small to moderate size pericardial effusion  Peripancreatic edema/fluid  Postsurgical distortion right kidney  9 mm calculus in the lower pole left kidney  Endocrine: Negative for polydipsia and polyuria  Genitourinary: Negative for difficulty urinating  01/2018 PSA 0 59  Musculoskeletal: Negative for arthralgias and myalgias  Skin: Negative for rash  Allergic/Immunologic: Negative for environmental allergies  Neurological: Negative for dizziness, weakness and headaches  Hematological: Negative for adenopathy  Does not bruise/bleed easily  Psychiatric/Behavioral: Negative for dysphoric mood and sleep disturbance  Objective:      /64   Pulse 72   Temp 99 2 °F (37 3 °C)   Resp 16   Ht 5' 9 8" (1 773 m)   Wt 108 kg (237 lb)   BMI 34 20 kg/m²          Physical Exam   Constitutional: He is oriented to person, place, and time  He appears well-developed and well-nourished  No distress  HENT:   Right Ear: Tympanic membrane normal    Left Ear: Tympanic membrane normal    Mouth/Throat: Oropharynx is clear and moist    Eyes: Pupils are equal, round, and reactive to light  Conjunctivae and EOM are normal  No scleral icterus  Neck: No JVD present  Carotid bruit is not present  No tracheal deviation present  No thyroid mass and no thyromegaly present  Cardiovascular: Normal rate, regular rhythm and normal heart sounds  Exam reveals no gallop  No murmur heard  Pulses:       Carotid pulses are 2+ on the right side, and 2+ on the left side  Pulmonary/Chest: Effort normal and breath sounds normal  No respiratory distress  He has no wheezes  He has no rales  Abdominal: Soft  Bowel sounds are normal  He exhibits no distension, no abdominal bruit and no mass  There is no hepatosplenomegaly  There is no tenderness  There is no rebound and no guarding  Musculoskeletal: Normal range of motion   He exhibits no edema  Lymphadenopathy:     He has no cervical adenopathy  Neurological: He is alert and oriented to person, place, and time  No cranial nerve deficit  Skin: No rash noted  No cyanosis  Nails show no clubbing  Psychiatric: He has a normal mood and affect  Nursing note and vitals reviewed          Recent Results (from the past 1344 hour(s))   Hemoglobin A1C    Collection Time: 08/06/19 12:00 AM   Result Value Ref Range    Hemoglobin A1C 6 4

## 2019-08-21 NOTE — PATIENT INSTRUCTIONS
Obesity   AMBULATORY CARE:   Obesity  is when your body mass index (BMI) is greater than 30  Your healthcare provider will use your height and weight to measure your BMI  The risks of obesity include  many health problems, such as injuries or physical disability  You may need tests to check for the following:  · Diabetes     · High blood pressure or high cholesterol     · Heart disease     · Gallbladder or liver disease     · Cancer of the colon, breast, prostate, liver, or kidney     · Sleep apnea     · Arthritis or gout  Seek care immediately if:   · You have a severe headache, confusion, or difficulty speaking  · You have weakness on one side of your body  · You have chest pain, sweating, or shortness of breath  Contact your healthcare provider if:   · You have symptoms of gallbladder or liver disease, such as pain in your upper abdomen  · You have knee or hip pain and discomfort while walking  · You have symptoms of diabetes, such as intense hunger and thirst, and frequent urination  · You have symptoms of sleep apnea, such as snoring or daytime sleepiness  · You have questions or concerns about your condition or care  Treatment for obesity  focuses on helping you lose weight to improve your health  Even a small decrease in BMI can reduce the risk for many health problems  Your healthcare provider will help you set a weight-loss goal   · Lifestyle changes  are the first step in treating obesity  These include making healthy food choices and getting regular physical activity  Your healthcare provider may suggest a weight-loss program that involves coaching, education, and therapy  · Medicine  may help you lose weight when it is used with a healthy diet and physical activity  · Surgery  can help you lose weight if you are very obese and have other health problems  There are several types of weight-loss surgery  Ask your healthcare provider for more information    Be successful losing weight:   · Set small, realistic goals  An example of a small goal is to walk for 20 minutes 5 days a week  Anther goal is to lose 5% of your body weight  · Tell friends, family members, and coworkers about your goals  and ask for their support  Ask a friend to lose weight with you, or join a weight-loss support group  · Identify foods or triggers that may cause you to overeat , and find ways to avoid them  Remove tempting high-calorie foods from your home and workplace  Place a bowl of fresh fruit on your kitchen counter  If stress causes you to eat, then find other ways to cope with stress  · Keep a diary to track what you eat and drink  Also write down how many minutes of physical activity you do each day  Weigh yourself once a week and record it in your diary  Eating changes: You will need to eat 500 to 1,000 fewer calories each day than you currently eat to lose 1 to 2 pounds a week  The following changes will help you cut calories:  · Eat smaller portions  Use small plates, no larger than 9 inches in diameter  Fill your plate half full of fruits and vegetables  Measure your food using measuring cups until you know what a serving size looks like  · Eat 3 meals and 1 or 2 snacks each day  Plan your meals in advance  Javon Young and eat at home most of the time  Eat slowly  · Eat fruits and vegetables at every meal   They are low in calories and high in fiber, which makes you feel full  Do not add butter, margarine, or cream sauce to vegetables  Use herbs to season steamed vegetables  · Eat less fat and fewer fried foods  Eat more baked or grilled chicken and fish  These protein sources are lower in calories and fat than red meat  Limit fast food  Dress your salads with olive oil and vinegar instead of bottled dressing  · Limit the amount of sugar you eat  Do not drink sugary beverages  Limit alcohol  Activity changes:  Physical activity is good for your body in many ways   It helps you burn calories and build strong muscles  It decreases stress and depression, and improves your mood  It can also help you sleep better  Talk to your healthcare provider before you begin an exercise program   · Exercise for at least 30 minutes 5 days a week  Start slowly  Set aside time each day for physical activity that you enjoy and that is convenient for you  It is best to do both weight training and an activity that increases your heart rate, such as walking, bicycling, or swimming  · Find ways to be more active  Do yard work and housecleaning  Walk up the stairs instead of using elevators  Spend your leisure time going to events that require walking, such as outdoor festivals or fairs  This extra physical activity can help you lose weight and keep it off  Follow up with your healthcare provider as directed: You may need to meet with a dietitian  Write down your questions so you remember to ask them during your visits  © 2017 [address] [employer] Information is for End User's use only and may not be sold, redistributed or otherwise used for commercial purposes  All illustrations and images included in CareNotes® are the copyrighted property of [employer]  or Felix Silva  The above information is an  only  It is not intended as medical advice for individual conditions or treatments  Talk to your doctor, nurse or pharmacist before following any medical regimen to see if it is safe and effective for you  Urinary Incontinence   WHAT YOU NEED TO KNOW:   What is urinary incontinence? Urinary incontinence (UI) is when you lose control of your bladder  What causes UI? UI occurs because your bladder cannot store or empty urine properly  The following are the most common types of UI:  · Stress incontinence  is when you leak urine due to increased bladder pressure  This may happen when you cough, sneeze, or exercise       · Urge incontinence  is when you feel the need to urinate right away and leak urine accidentally  · Mixed incontinence  is when you have both stress and urge UI  What are the signs and symptoms of UI?   · You feel like your bladder does not empty completely when you urinate  · You urinate often and need to urinate immediately  · You leak urine when you sleep, or you wake up with the urge to urinate  · You leak urine when you cough, sneeze, exercise, or laugh  How is UI diagnosed? Your healthcare provider will ask how often you leak urine and whether you have stress or urge symptoms  Tell him which medicines you take, how often you urinate, and how much liquid you drink each day  You may need any of the following tests:  · Urine tests  may show infection or kidney function  · A pelvic exam  may be done to check for blockages  A pelvic exam will also show if your bladder, uterus, or other organs have moved out of place  · An x-ray, ultrasound, or CT  may show problems with parts of your urinary system  You may be given contrast liquid to help your organs show up better in the pictures  Tell the healthcare provider if you have ever had an allergic reaction to contrast liquid  Do not enter the MRI room with anything metal  Metal can cause serious injury  Tell the healthcare provider if you have any metal in or on your body  · A bladder scan  will show how much urine is left in your bladder after you urinate  You will be asked to urinate and then healthcare providers will use a small ultrasound machine to check the urine left in your bladder  · Cystometry  is used to check the function of your urinary system  Your healthcare provider checks the pressure in your bladder while filling it with fluid  Your bladder pressure may also be tested when your bladder is full and while you urinate  How is UI treated? · Medicines  can help strengthen your bladder control      · Electrical stimulation  is used to send a small amount of electrical energy to your pelvic floor muscles  This helps control your bladder function  Electrodes may be placed outside your body or in your rectum  For women, the electrodes may be placed in the vagina  · A bulking agent  may be injected into the wall of your urethra to make it thicker  This helps keep your urethra closed and decreases urine leakage  · Devices  such as a clamp, pessary, or tampon may help stop urine leaks  Ask your healthcare provider for more information about these and other devices  · Surgery  may be needed if other treatments do not work  Several types of surgery can help improve your bladder control  Ask your healthcare provider for more information about the surgery you may need  How can I manage my symptoms? · Do pelvic muscle exercises often  Your pelvic muscles help you stop urinating  Squeeze these muscles tight for 5 seconds, then relax for 5 seconds  Gradually work up to squeezing for 10 seconds  Do 3 sets of 15 repetitions a day, or as directed  This will help strengthen your pelvic muscles and improve bladder control  · A catheter  may be used to help empty your bladder  A catheter is a tiny, plastic tube that is put into your bladder to drain your urine  Your healthcare provider may tell you to use a catheter to prevent your bladder from getting too full and leaking urine  · Keep a UI record  Write down how often you leak urine and how much you leak  Make a note of what you were doing when you leaked urine  · Train your bladder  Go to the bathroom at set times, such as every 2 hours, even if you do not feel the urge to go  You can also try to hold your urine when you feel the urge to go  For example, hold your urine for 5 minutes when you feel the urge to go  As that becomes easier, hold your urine for 10 minutes  · Drink liquids as directed  Ask your healthcare provider how much liquid to drink each day and which liquids are best for you   You may need to limit the amount of liquid you drink to help control your urine leakage  Limit or do not have drinks that contain caffeine or alcohol  Do not drink any liquid right before you go to bed  · Prevent constipation  Eat a variety of high-fiber foods  Good examples are high-fiber cereals, beans, vegetables, and whole-grain breads  Prune juice may help make your bowel movement softer  Walking is the best way to trigger your intestines to have a bowel movement  · Exercise regularly and maintain a healthy weight  Ask your healthcare provider how much you should weigh and about the best exercise plan for you  Weight loss and exercise will decrease pressure on your bladder and help you control your leakage  Ask him to help you create a weight loss plan if you are overweight  When should I seek immediate care? · You have severe pain  · You are confused or cannot think clearly  When should I contact my healthcare provider? · You have a fever  · You see blood in your urine  · You have pain when you urinate  · You have new or worse pain, even after treatment  · Your mouth feels dry or you have vision changes  · Your urine is cloudy or smells bad  · You have questions or concerns about your condition or care  CARE AGREEMENT:   You have the right to help plan your care  Learn about your health condition and how it may be treated  Discuss treatment options with your caregivers to decide what care you want to receive  You always have the right to refuse treatment  The above information is an  only  It is not intended as medical advice for individual conditions or treatments  Talk to your doctor, nurse or pharmacist before following any medical regimen to see if it is safe and effective for you  © 2017 [address] [employer] Information is for End User's use only and may not be sold, redistributed or otherwise used for commercial purposes   All illustrations and images included in CareNotes® are the copyrighted property of [employer]  or Felix Silva  Cigarette Smoking and Your Health   AMBULATORY CARE:   Risks to your health if you smoke:  Nicotine and other chemicals found in tobacco damage every cell in your body  Even if you are a light smoker, you have an increased risk for cancer, heart disease, and lung disease  If you are pregnant or have diabetes, smoking increases your risk for complications  Benefits to your health if you stop smoking:   · You decrease respiratory symptoms such as coughing, wheezing, and shortness of breath  · You reduce your risk for cancers of the lung, mouth, throat, kidney, bladder, pancreas, stomach, and cervix  If you already have cancer, you increase the benefits of chemotherapy  You also reduce your risk for cancer returning or a second cancer from developing  · You reduce your risk for heart disease, blood clots, heart attack, and stroke  · You reduce your risk for lung infections, and diseases such as pneumonia, asthma, chronic bronchitis, and emphysema  · Your circulation improves  More oxygen can be delivered to your body  If you have diabetes, you lower your risk for complications, such as kidney, artery, and eye diseases  You also lower your risk for nerve damage  Nerve damage can lead to amputations, poor vision, and blindness  · You improve your body's ability to heal and to fight infections  Benefits to the health of others if you stop smoking:  Tobacco is harmful to nonsmokers who breathe in your secondhand smoke  The following are ways the health of others around you may improve when you stop smoking:  · You lower the risks for lung cancer and heart disease in nonsmoking adults  · If you are pregnant, you lower the risk for miscarriage, early delivery, low birth weight, and stillbirth  You also lower your baby's risk for SIDS, obesity, developmental delay, and neurobehavioral problems, such as ADHD  · If you have children, you lower their risk for ear infections, colds, pneumonia, bronchitis, and asthma  For more information and support to stop smoking:   · Smokefree  gov  Phone: 3- 515 - 333-6643  Web Address: www smokefrNotice Kiosk  Follow up with your healthcare provider as directed:  Write down your questions so you remember to ask them during your visits  © 2017 [address] [employer] Information is for End User's use only and may not be sold, redistributed or otherwise used for commercial purposes  All illustrations and images included in CareNotes® are the copyrighted property of [employer]  or Felix Silva  The above information is an  only  It is not intended as medical advice for individual conditions or treatments  Talk to your doctor, nurse or pharmacist before following any medical regimen to see if it is safe and effective for you  Fall Prevention   WHAT YOU NEED TO KNOW:   What is fall prevention? Fall prevention includes ways to make your home and other areas safer  It also includes ways you can move more carefully to prevent a fall  What increases my risk for falls? · Lack of vitamin D    · Not getting enough sleep each night    · Trouble walking or keeping your balance, or foot problems    · Health conditions that cause changes in your blood pressure, vision, or muscle strength and coordination    · Medicines that make you dizzy, weak, or sleepy    · Problems seeing clearly    · Shoes that have high heels or are not supportive    · Tripping hazards, such as items left on the floor, no handrails on the stairs, or broken steps  How can I help protect myself from falls? · Stand or sit up slowly  This may help you keep your balance and prevent falls  If you need to get up during the night, sit up first  Be sure you are fully awake before you stand  Turn on the light before you start walking  Go slowly in case you are still sleepy   Make sure you will not trip over any pets sleeping in the bedroom  · Use assistive devices as directed  Your healthcare provider may suggest that you use a cane or walker to help you keep your balance  You may need to have grab bars put in your bathroom near the toilet or in the shower  · Wear shoes that fit well and have soles that   Wear shoes both inside and outside  Use slippers with good   Do not wear shoes with high heels  · Wear a personal alarm  This is a device that allows you to call 911 if you fall and need help  Ask your healthcare provider for more information  · Stay active  Exercise can help strengthen your muscles and improve your balance  Your healthcare provider may recommend water aerobics or walking  He or she may also recommend physical therapy to improve your coordination  Never start an exercise program without talking to your healthcare provider first      · Manage medical conditions  Keep all appointments with your healthcare providers  Visit your eye doctor as directed  How can I make my home safer? · Add items to prevent falls in the bathroom  Put nonslip strips on your bath or shower floor to prevent you from slipping  Use a bath mat if you do not have carpet in the bathroom  This will prevent you from falling when you step out of the bath or shower  Use a shower seat so you do not need to stand while you shower  Sit on the toilet or a chair in your bathroom to dry yourself and put on clothing  This will prevent you from losing your balance from drying or dressing yourself while you are standing  · Keep paths clear  Remove books, shoes, and other objects from walkways and stairs  Place cords for telephones and lamps out of the way so that you do not need to walk over them  Tape them down if you cannot move them  Remove small rugs  If you cannot remove a rug, secure it with double-sided tape  This will prevent you from tripping  · Install bright lights in your home  Use night lights to help light paths to the bathroom or kitchen  Always turn on the light before you start walking  · Keep items you use often on shelves within reach  Do not use a step stool to help you reach an item  · Paint or place reflective tape on the edges of your stairs  This will help you see the stairs better  Call 911 or have someone else call if:   · You have fallen and are unconscious  · You have fallen and cannot move part of your body  Contact your healthcare provider if:   · You have fallen and have pain or a headache  · You have questions or concerns about your condition or care  CARE AGREEMENT:   You have the right to help plan your care  Learn about your health condition and how it may be treated  Discuss treatment options with your caregivers to decide what care you want to receive  You always have the right to refuse treatment  The above information is an  only  It is not intended as medical advice for individual conditions or treatments  Talk to your doctor, nurse or pharmacist before following any medical regimen to see if it is safe and effective for you  © 2017 [address] [facility] Information is for End User's use only and may not be sold, redistributed or otherwise used for commercial purposes  All illustrations and images included in CareNotes® are the copyrighted property of [employer]  or Felix Silva  Advance Directives   WHAT YOU NEED TO KNOW:   What are advance directives? Advance directives are legal documents that state your wishes and plans for medical care  These plans are made ahead of time in case you lose your ability to make decisions for yourself  Advance directives can apply to any medical decision, such as the treatments you want, and if you want to donate organs  What are the types of advance directives? There are many types of advance directives, and each state has rules about how to use them   You may choose a combination of any of the following:  · Living will: This is a written record of the treatment you want  You can also choose which treatments you do not want, which to limit, and which to stop at a certain time  This includes surgery, medicine, IV fluid, and tube feedings  · Durable power of  for healthcare [facility]): This is a written record that states who you want to make healthcare choices for you when you are unable to make them for yourself  This person, called a proxy, is usually a family member or a friend  You may choose more than 1 proxy  · Do not resuscitate (DNR) order:  A DNR order is used in case your heart stops beating or you stop breathing  It is a request not to have certain forms of treatment, such as CPR  A DNR order may be included in other types of advance directives  · Medical directive: This covers the care that you want if you are in a coma, near death, or unable to make decisions for yourself  You can list the treatments you want for each condition  Treatment may include pain medicine, surgery, blood transfusions, dialysis, IV or tube feedings, and a ventilator (breathing machine)  · Values history: This document has questions about your views, beliefs, and how you feel and think about life  This information can help others choose the care that you would choose  Why are advance directives important? An advance directive helps you control your care  Although spoken wishes may be used, it is better to have your wishes written down  Spoken wishes can be misunderstood, or not followed  Treatments may be given even if you do not want them  An advance directive may make it easier for your family to make difficult choices about your care  How do I decide what to put in my advance directives? · Make informed decisions:  Make sure you fully understand treatments or care you may receive   Think about the benefits and problems your decisions could cause for you or your family  Talk to healthcare providers if you have concerns or questions before you write down your wishes  You may also want to talk with your [employer] or , or a   Check your state laws to make sure that what you put in your advance directive is legal      · Sign all forms:  Sign and date your advance directive when you have finished  You may also need 2 witnesses to sign the forms  Witnesses cannot be your doctor or his staff, your spouse, heirs or beneficiaries, people you owe money to, or your chosen proxy  Talk to your family, proxy, and healthcare providers about your advance directive  Give each person a copy, and keep one for yourself in a place you can get to easily  Do not keep it hidden or locked away  · Review and revise your plans: You can revise your advance directive at any time, as long as you are able to make decisions  Review your plan every year, and when there are changes in your life, or your health  When you make changes, let your family, proxy, and healthcare providers know  Give each a new copy  Where can I find more information? · [employer]  [address] , [address]  Phone: 2- 315 - 923-2472  Phone: 2- 550 - 011-7482  Web Address: http://www  aafp org  · [address] [facility])  [address] [facility], [address]  Phone: 9- [phone] - [phone]  Phone: [phone]  Web Address: Francisca vizcarra  [facility] AGREEMENT:   You have the right to help plan your care  To help with this plan, you must learn about your health condition and treatment options  You must also learn about advance directives and how they are used  Work with your healthcare providers to decide what care will be used to treat you  You always have the right to refuse treatment  The above information is an  only   It is not intended as medical advice for individual conditions or treatments  Talk to your doctor, nurse or pharmacist before following any medical regimen to see if it is safe and effective for you  © 2017 [address] Information is for End User's use only and may not be sold, redistributed or otherwise used for commercial purposes  All illustrations and images included in CareNotes® are the copyrighted property of [employer] M [employer]  or Felix Silva

## 2019-08-22 ENCOUNTER — TELEPHONE (OUTPATIENT)
Dept: FAMILY MEDICINE CLINIC | Facility: CLINIC | Age: 64
End: 2019-08-22

## 2019-08-22 NOTE — TELEPHONE ENCOUNTER
According to colonoscopy report, he was to return in 2016 for another colonoscopy----- Message from Minh Irwin MD sent at 8/21/2019  4:54 PM EDT -----  Re  colonoscopy 04/2013 by Dr Kalyan Velásquez in [address] [place] [address]

## 2020-02-12 LAB
CREAT ?TM UR-SCNC: 209 UMOL/L
EXT MICROALBUMIN URINE RANDOM: 3.4
HBA1C MFR BLD HPLC: 6.2 %
MICROALBUMIN/CREAT UR: 16.3 MG/G{CREAT}

## 2020-02-24 ENCOUNTER — OFFICE VISIT (OUTPATIENT)
Dept: FAMILY MEDICINE CLINIC | Facility: CLINIC | Age: 65
End: 2020-02-24
Payer: MEDICARE

## 2020-02-24 VITALS
BODY MASS INDEX: 35.07 KG/M2 | TEMPERATURE: 98.8 F | HEART RATE: 78 BPM | RESPIRATION RATE: 16 BRPM | DIASTOLIC BLOOD PRESSURE: 64 MMHG | WEIGHT: 245 LBS | SYSTOLIC BLOOD PRESSURE: 120 MMHG | HEIGHT: 70 IN

## 2020-02-24 DIAGNOSIS — I10 BENIGN ESSENTIAL HYPERTENSION: ICD-10-CM

## 2020-02-24 DIAGNOSIS — E11.9 TYPE 2 DIABETES MELLITUS WITHOUT COMPLICATION, WITHOUT LONG-TERM CURRENT USE OF INSULIN (HCC): Primary | ICD-10-CM

## 2020-02-24 DIAGNOSIS — G47.33 OBSTRUCTIVE SLEEP APNEA: ICD-10-CM

## 2020-02-24 DIAGNOSIS — K76.0 FATTY LIVER: ICD-10-CM

## 2020-02-24 DIAGNOSIS — E78.2 MIXED HYPERLIPIDEMIA: ICD-10-CM

## 2020-02-24 PROCEDURE — 3074F SYST BP LT 130 MM HG: CPT | Performed by: FAMILY MEDICINE

## 2020-02-24 PROCEDURE — 99214 OFFICE O/P EST MOD 30 MIN: CPT | Performed by: FAMILY MEDICINE

## 2020-02-24 PROCEDURE — 2022F DILAT RTA XM EVC RTNOPTHY: CPT | Performed by: FAMILY MEDICINE

## 2020-02-24 PROCEDURE — 3008F BODY MASS INDEX DOCD: CPT | Performed by: FAMILY MEDICINE

## 2020-02-24 PROCEDURE — 4040F PNEUMOC VAC/ADMIN/RCVD: CPT | Performed by: FAMILY MEDICINE

## 2020-02-24 PROCEDURE — 1160F RVW MEDS BY RX/DR IN RCRD: CPT | Performed by: FAMILY MEDICINE

## 2020-02-24 PROCEDURE — 3078F DIAST BP <80 MM HG: CPT | Performed by: FAMILY MEDICINE

## 2020-02-24 PROCEDURE — 3044F HG A1C LEVEL LT 7.0%: CPT | Performed by: FAMILY MEDICINE

## 2020-02-24 RX ORDER — ASPIRIN 81 MG/1
81 TABLET ORAL DAILY
COMMUNITY

## 2020-02-24 NOTE — PROGRESS NOTES
Assessment/Plan:       Diagnoses and all orders for this visit:    Type 2 diabetes mellitus without complication, without long-term current use of insulin (HCC)  -     Hemoglobin A1C    Obstructive sleep apnea    Benign essential hypertension  -     Comprehensive metabolic panel  -     CBC and differential    Mixed hyperlipidemia  -     Lipid panel    Fatty liver    Other orders  -     aspirin (ECOTRIN LOW STRENGTH) 81 mg EC tablet; Take 81 mg by mouth daily          Continue with current medications  OV 6 months with repeat labs at that time  BMI Counseling: Body mass index is 35 36 kg/m²  The BMI is above normal  Nutrition recommendations include reducing portion sizes, decreasing overall calorie intake, consuming healthier snacks, moderation in carbohydrate intake, reducing intake of saturated fat and trans fat and reducing intake of cholesterol  Exercise recommendations include exercising 3-5 times per week  Patient ID: Radha Archuleta is a 75 y o  male  Followup visit  Medications reviewed  Type 2 DM diet controlled  02/2020   A1c 6 2  02/2020 urine albumin 3 4  Micro albumin/creatinine ratio 16 3 < 30   on ACE  no DPN  Current with eye exam 08/2019 no retinopathy  Hypertension blood pressures have been stable on Lisinopril 10 mg daily  02/2020 creatinine 1 03  Electrolytes normal  Hgb 14 2  Hyperlipidemia mixed type on Atorvastatin 10 mg daily and 2 fish oils/day   Lipid profile 02/2020 cholesterol 129  Triglycerides 126  HDL 30  LDL 74  LFTs normal   08/2019 TSH 1 500  The following portions of the patient's history were reviewed and updated as appropriate: allergies, current medications, past family history, past medical history, past social history, past surgical history and problem list     Review of Systems   Constitutional: Positive for unexpected weight change (8 lb weight gain from 08/2019 )  Negative for appetite change, chills, fatigue and fever     HENT: Negative for congestion, ear pain, hearing loss, rhinorrhea, sore throat and trouble swallowing  Eyes: Negative for visual disturbance  Respiratory: Negative for cough, shortness of breath and wheezing  OSMAN he uses CPAP   S/p admission 06/2018 for bilateral pulmonary emboli  Venous Dopplers were not done in the hospital   PE unprovoked with no history of recent travel  no hospitalizations or surgeries  No family history of DVT/PE  No personal history of DVT or pulmonary embolus  No cancer history  Colonoscopy 04/2013  Workup in the hospital chest x-ray small area of infiltrate/atelectasis left lung base and small left effusion  Probable minimal right pleural effusion  CT scan abdomen and pelvis without contrast similar mild to moderate left hydronephrosis  left renal pelvic calculi seen  Small bilateral effusions and bilateral dependent subsegmental atelectasis  Normal liver, spleen and pancreas  No lymphadenopathy  CT scan of chest showed bilateral pulmonary emboli left greater than right in the lower lobes  Pleural effusions left greater than right  Basal atelectasis  Possibly small left lower lobe pulmonary infarct  No evidence of right cardiac pressure overload  Mild pericardial effusion  Echocardiogram normal left ventricular chamber size normal left ventricular systolic function  No regional wall motion abnormalities  Mild concentric LV H  EF 60%  Moderate tricuspid regurgitation  Normal pulmonary artery systolic blood pressure  Mild diastolic dysfunction with normal filling pressures  Small pericardial effusion without evidence of hemodynamic compromise  EKG normal sinus rhythm no acute changes  Patient completed 6 months of Eliquis  07/2018 prothrombin mutation gene and Factor V Leidin negative  08/2019 D dimer < 0 27   Cardiovascular: Negative for chest pain, palpitations and leg swelling     Gastrointestinal: Negative for abdominal pain, blood in stool, constipation, diarrhea, nausea and vomiting  Colonoscopy 04/2013  Endocrine: Negative for polydipsia and polyuria  Genitourinary: Negative for difficulty urinating  02/2020 PSA 0 92   Musculoskeletal: Negative for arthralgias and myalgias  Skin: Negative for rash  Allergic/Immunologic: Negative for environmental allergies  Neurological: Negative for dizziness and headaches  Hematological: Negative for adenopathy  Does not bruise/bleed easily  Psychiatric/Behavioral: Negative for dysphoric mood and sleep disturbance  The patient is not nervous/anxious  Objective:      /64   Pulse 78   Temp 98 8 °F (37 1 °C)   Resp 16   Ht 5' 9 8" (1 773 m)   Wt 111 kg (245 lb)   BMI 35 36 kg/m²     Wt Readings from Last 3 Encounters:   02/24/20 111 kg (245 lb)   08/21/19 108 kg (237 lb)   02/21/19 108 kg (238 lb)        Physical Exam   Constitutional: He is oriented to person, place, and time  He appears well-developed and well-nourished  No distress  HENT:   Right Ear: Tympanic membrane normal    Left Ear: Tympanic membrane normal    Mouth/Throat: Oropharynx is clear and moist    Eyes: Pupils are equal, round, and reactive to light  Conjunctivae and EOM are normal  No scleral icterus  Neck: No JVD present  Carotid bruit is not present  No tracheal deviation present  No thyroid mass and no thyromegaly present  Cardiovascular: Normal rate, regular rhythm and normal heart sounds  Exam reveals no gallop  Pulses are no weak pulses  No murmur heard  Pulses:       Carotid pulses are 2+ on the right side, and 2+ on the left side  Dorsalis pedis pulses are 2+ on the right side, and 2+ on the left side  Posterior tibial pulses are 2+ on the right side, and 2+ on the left side  Pulmonary/Chest: Effort normal and breath sounds normal  No respiratory distress  He has no wheezes  He has no rales  Abdominal: Soft  Bowel sounds are normal  He exhibits no distension, no abdominal bruit and no mass  There is no hepatosplenomegaly  There is no tenderness  There is no rebound and no guarding  Musculoskeletal: Normal range of motion  He exhibits no edema  Feet:   Right Foot:   Skin Integrity: Negative for ulcer, skin breakdown, erythema, warmth, callus or dry skin  Left Foot:   Skin Integrity: Negative for ulcer, skin breakdown, erythema, warmth, callus or dry skin  Lymphadenopathy:     He has no cervical adenopathy  Neurological: He is alert and oriented to person, place, and time  No cranial nerve deficit  Skin: No rash noted  No cyanosis  Nails show no clubbing  Psychiatric: He has a normal mood and affect  Nursing note and vitals reviewed  Patient's shoes and socks removed  Right Foot/Ankle   Right Foot Inspection  Skin Exam: skin normal and skin intact no dry skin, no warmth, no callus, no erythema, no maceration, no abnormal color, no pre-ulcer, no ulcer and no callus                          Toe Exam: ROM and strength within normal limits  Sensory       Monofilament testing: intact  Vascular  Capillary refills: < 3 seconds  The right DP pulse is 2+  The right PT pulse is 2+  Left Foot/Ankle  Left Foot Inspection  Skin Exam: skin normal and skin intactno dry skin, no warmth, no erythema, no maceration, normal color, no pre-ulcer, no ulcer and no callus                         Toe Exam: ROM and strength within normal limits                   Sensory       Monofilament: intact  Vascular  Capillary refills: < 3 seconds  The left DP pulse is 2+  The left PT pulse is 2+  Assign Risk Category:  No deformity present; No loss of protective sensation;  No weak pulses       Risk: 0         Hemoglobin A1C (no units)   Date Value   02/12/2020 6 2   08/06/2019 6 4   02/05/2019 6 4

## 2020-08-20 LAB — HBA1C MFR BLD HPLC: 6.3 %

## 2020-09-02 ENCOUNTER — OFFICE VISIT (OUTPATIENT)
Dept: FAMILY MEDICINE CLINIC | Facility: CLINIC | Age: 65
End: 2020-09-02
Payer: MEDICARE

## 2020-09-02 VITALS
TEMPERATURE: 98.9 F | HEIGHT: 70 IN | RESPIRATION RATE: 16 BRPM | DIASTOLIC BLOOD PRESSURE: 64 MMHG | SYSTOLIC BLOOD PRESSURE: 118 MMHG | BODY MASS INDEX: 34.07 KG/M2 | WEIGHT: 238 LBS | HEART RATE: 62 BPM

## 2020-09-02 DIAGNOSIS — Z00.00 INITIAL MEDICARE ANNUAL WELLNESS VISIT: ICD-10-CM

## 2020-09-02 DIAGNOSIS — Z11.59 NEED FOR HEPATITIS C SCREENING TEST: ICD-10-CM

## 2020-09-02 DIAGNOSIS — Z12.5 SCREENING FOR PROSTATE CANCER: ICD-10-CM

## 2020-09-02 DIAGNOSIS — E78.2 MIXED HYPERLIPIDEMIA: ICD-10-CM

## 2020-09-02 DIAGNOSIS — G47.33 OBSTRUCTIVE SLEEP APNEA: ICD-10-CM

## 2020-09-02 DIAGNOSIS — I10 BENIGN ESSENTIAL HYPERTENSION: ICD-10-CM

## 2020-09-02 DIAGNOSIS — K76.0 FATTY LIVER: ICD-10-CM

## 2020-09-02 DIAGNOSIS — Z23 NEED FOR PNEUMOCOCCAL VACCINATION: ICD-10-CM

## 2020-09-02 DIAGNOSIS — Z23 NEED FOR INFLUENZA VACCINATION: ICD-10-CM

## 2020-09-02 DIAGNOSIS — E11.9 TYPE 2 DIABETES MELLITUS WITHOUT COMPLICATION, WITHOUT LONG-TERM CURRENT USE OF INSULIN (HCC): Primary | ICD-10-CM

## 2020-09-02 PROBLEM — N52.9 IMPOTENCE OF ORGANIC ORIGIN: Status: ACTIVE | Noted: 2020-09-02

## 2020-09-02 PROCEDURE — G0438 PPPS, INITIAL VISIT: HCPCS | Performed by: FAMILY MEDICINE

## 2020-09-02 PROCEDURE — G0008 ADMIN INFLUENZA VIRUS VAC: HCPCS

## 2020-09-02 PROCEDURE — 90732 PPSV23 VACC 2 YRS+ SUBQ/IM: CPT

## 2020-09-02 PROCEDURE — G0009 ADMIN PNEUMOCOCCAL VACCINE: HCPCS

## 2020-09-02 PROCEDURE — 99214 OFFICE O/P EST MOD 30 MIN: CPT | Performed by: FAMILY MEDICINE

## 2020-09-02 PROCEDURE — 90662 IIV NO PRSV INCREASED AG IM: CPT

## 2020-09-02 RX ORDER — LISINOPRIL 20 MG/1
20 TABLET ORAL DAILY
Qty: 90 TABLET | Refills: 3 | Status: SHIPPED | OUTPATIENT
Start: 2020-09-02 | End: 2020-09-02 | Stop reason: SDUPTHER

## 2020-09-02 RX ORDER — LISINOPRIL 20 MG/1
20 TABLET ORAL DAILY
Qty: 90 TABLET | Refills: 3 | Status: SHIPPED | OUTPATIENT
Start: 2020-09-02 | End: 2021-07-23

## 2020-09-02 RX ORDER — ATORVASTATIN CALCIUM 10 MG/1
10 TABLET, FILM COATED ORAL DAILY
Qty: 90 TABLET | Refills: 3 | Status: SHIPPED | OUTPATIENT
Start: 2020-09-02 | End: 2021-09-21 | Stop reason: SDUPTHER

## 2020-09-02 RX ORDER — ATORVASTATIN CALCIUM 10 MG/1
10 TABLET, FILM COATED ORAL DAILY
Qty: 90 TABLET | Refills: 3 | Status: SHIPPED | OUTPATIENT
Start: 2020-09-02 | End: 2020-09-02 | Stop reason: SDUPTHER

## 2020-09-02 NOTE — PROGRESS NOTES
Assessment/Plan:     Diagnoses and all orders for this visit:    Type 2 diabetes mellitus without complication, without long-term current use of insulin (HCC)  -     Microalbumin / creatinine urine ratio  -     Hemoglobin A1C    Benign essential hypertension  -     CBC and differential  -     Comprehensive metabolic panel  -     lisinopril (ZESTRIL) 20 mg tablet; Take 1 tablet (20 mg total) by mouth daily    Mixed hyperlipidemia  -     Lipid panel  -     atorvastatin (LIPITOR) 10 mg tablet; Take 1 tablet (10 mg total) by mouth daily    Fatty liver    Obstructive sleep apnea    Initial Medicare annual wellness visit    Screening for prostate cancer  -     PSA, Total Screen; Future    Need for pneumococcal vaccination  -     PNEUMOCOCCAL POLYSACCHARIDE VACCINE 23-VALENT =>3YO SQ IM    Need for influenza vaccination  -     influenza vaccine, high-dose, PF 0 7 mL (FLUZONE HIGH-DOSE)    Need for hepatitis C screening test  -     Hepatitis C antibody; Future    Other orders  -     Cancel: Hepatitis C antibody; Future  -     Cancel: Ambulatory referral to Gastroenterology; Future          Continue with current medications  Flu vaccine and PneumoVax 23 today  Office visit 6 months with repeat labs at that time  Patient declined colon CA screening     BMI Counseling: Body mass index is 34 35 kg/m²  The BMI is above normal  Nutrition recommendations include reducing portion sizes, decreasing overall calorie intake, consuming healthier snacks, moderation in carbohydrate intake, reducing intake of saturated fat and trans fat and reducing intake of cholesterol  Exercise recommendations include exercising 3-5 times per week  Patient ID: Brianna Avina is a 75 y o  male  Followup visit  Medications reviewed  Type 2 DM diet controlled  08/2020 FBS 91  A1c 6 3  02/2020   A1c 6 2  02/2020 urine albumin 3 4  Micro albumin/creatinine ratio 16 3 < 30   on ACE  no DPN  Current with eye exam 08/2019 no retinopathy  Hypertension blood pressures have been stable on Lisinopril 10 mg daily  08/2020 creatinine 0 90  Electrolytes normal  Hgb 13 9  Hyperlipidemia mixed type on Atorvastatin 10 mg daily and 2 fish oils/day   Lipid profile 08/2020 cholesterol 122  Triglycerides  176 increased from 126  HDL 30  LDL 57  LFTs normal   08/2019 TSH 1 500  The following portions of the patient's history were reviewed and updated as appropriate: allergies, current medications, past family history, past medical history, past social history, past surgical history and problem list     Review of Systems   Constitutional: Positive for unexpected weight change (7 lb weight loss from 02/2020 with diet)  Negative for appetite change, chills, fatigue and fever  HENT: Negative for congestion, ear pain, hearing loss, rhinorrhea, sore throat and trouble swallowing  Eyes: Negative for visual disturbance  Respiratory: Negative for cough, shortness of breath and wheezing  OSMAN he uses CPAP   S/p admission 06/2018 for bilateral pulmonary emboli  Venous Dopplers were not done in the hospital   PE unprovoked with no history of recent travel  no hospitalizations or surgeries  No family history of DVT/PE  No personal history of DVT or pulmonary embolus  No cancer history  Colonoscopy 04/2013  Workup in the hospital chest x-ray small area of infiltrate/atelectasis left lung base and small left effusion  Probable minimal right pleural effusion  CT scan abdomen and pelvis without contrast similar mild to moderate left hydronephrosis  left renal pelvic calculi seen  Small bilateral effusions and bilateral dependent subsegmental atelectasis  Normal liver, spleen and pancreas  No lymphadenopathy  CT scan chest showed bilateral pulmonary emboli left greater than right in the lower lobes  Pleural effusions left greater than right  Basal atelectasis  Possibly small left lower lobe pulmonary infarct    No evidence of right cardiac pressure overload  Mild pericardial effusion  Echocardiogram normal left ventricular chamber size normal left ventricular systolic function  No regional wall motion abnormalities  Mild concentric LV H  EF 60%  Moderate tricuspid regurgitation  Normal pulmonary artery systolic blood pressure  Mild diastolic dysfunction with normal filling pressures  Small pericardial effusion without evidence of hemodynamic compromise  EKG normal sinus rhythm no acute changes  Patient completed 6 months of Eliquis  07/2018 prothrombin mutation gene and Factor V Leidin negative  08/2019 D dimer < 0 27   Cardiovascular: Negative for chest pain, palpitations and leg swelling  Gastrointestinal: Negative for abdominal pain, blood in stool, constipation, diarrhea, nausea and vomiting  Colonoscopy 04/2013  Endocrine: Negative for polydipsia and polyuria  Genitourinary: Negative for difficulty urinating  02/2020 PSA 0 92   Musculoskeletal: Negative for arthralgias and myalgias  Skin: Negative for rash  Allergic/Immunologic: Negative for environmental allergies  Neurological: Negative for dizziness and headaches  Hematological: Negative for adenopathy  Does not bruise/bleed easily  Psychiatric/Behavioral: Negative for dysphoric mood and sleep disturbance  The patient is not nervous/anxious  Objective:      /64   Pulse 62   Temp 98 9 °F (37 2 °C)   Resp 16   Ht 5' 9 8" (1 773 m)   Wt 108 kg (238 lb)   BMI 34 35 kg/m²     BP Readings from Last 3 Encounters:   09/02/20 118/64   02/24/20 120/64   08/21/19 118/64     Wt Readings from Last 3 Encounters:   09/02/20 108 kg (238 lb)   02/24/20 111 kg (245 lb)   08/21/19 108 kg (237 lb)            Physical Exam  Vitals signs and nursing note reviewed  Constitutional:       General: He is not in acute distress  Appearance: He is well-developed     HENT:      Right Ear: Tympanic membrane normal       Left Ear: Tympanic membrane normal  Eyes:      General: No scleral icterus  Conjunctiva/sclera: Conjunctivae normal       Pupils: Pupils are equal, round, and reactive to light  Comments:  Funduscopic exam normal   Neck:      Thyroid: No thyroid mass or thyromegaly  Vascular: No carotid bruit or JVD  Trachea: No tracheal deviation  Cardiovascular:      Rate and Rhythm: Normal rate and regular rhythm  Pulses:           Carotid pulses are 2+ on the right side and 2+ on the left side  Heart sounds: Normal heart sounds  No murmur  No gallop  Pulmonary:      Effort: Pulmonary effort is normal  No respiratory distress  Breath sounds: Normal breath sounds  No wheezing or rales  Abdominal:      General: Bowel sounds are normal  There is no distension or abdominal bruit  Palpations: Abdomen is soft  There is no mass  Tenderness: There is no abdominal tenderness  There is no guarding or rebound  Musculoskeletal:      Right lower leg: No edema  Left lower leg: No edema  Lymphadenopathy:      Cervical: No cervical adenopathy  Skin:     Findings: No rash  Nails: There is no clubbing  Neurological:      Mental Status: He is alert and oriented to person, place, and time  Cranial Nerves: No cranial nerve deficit     Psychiatric:         Mood and Affect: Mood normal          Behavior: Behavior normal          Cognition and Memory: Cognition normal

## 2020-09-02 NOTE — PATIENT INSTRUCTIONS
Medicare Preventive Visit Patient Instructions  Thank you for completing your Welcome to Medicare Visit or Medicare Annual Wellness Visit today  Your next wellness visit will be due in one year (9/2/2021)  The screening/preventive services that you may require over the next 5-10 years are detailed below  Some tests may not apply to you based off risk factors and/or age  Screening tests ordered at today's visit but not completed yet may show as past due  Also, please note that scanned in results may not display below  Preventive Screenings:  Service Recommendations Previous Testing/Comments   Colorectal Cancer Screening  · Colonoscopy    · Fecal Occult Blood Test (FOBT)/Fecal Immunochemical Test (FIT)  · Fecal DNA/Cologuard Test  · Flexible Sigmoidoscopy Age: 54-65 years old   Colonoscopy: every 10 years (May be performed more frequently if at higher risk)  OR  FOBT/FIT: every 1 year  OR  Cologuard: every 3 years  OR  Sigmoidoscopy: every 5 years  Screening may be recommended earlier than age 48 if at higher risk for colorectal cancer  Also, an individualized decision between you and your healthcare provider will decide whether screening between the ages of 74-80 would be appropriate   Colonoscopy: Not on file  FOBT/FIT: Not on file  Cologuard: Not on file  Sigmoidoscopy: Not on file         Prostate Cancer Screening Individualized decision between patient and health care provider in men between ages of 53-78   Medicare will cover every 12 months beginning on the day after your 50th birthday PSA: No results in last 5 years          Hepatitis C Screening Once for adults born between [birth year] and 1965  More frequently in patients at high risk for Hepatitis C Hep C Antibody: Not on file       Diabetes Screening 1-2 times per year if you're at risk for diabetes or have pre-diabetes Fasting glucose: No results in last 5 years   A1C: 6 3 %    Screening Not Indicated  History Diabetes   Cholesterol Screening Once every 5 years if you don't have a lipid disorder  May order more often based on risk factors  Lipid panel: 08/20/2020    Screening Not Indicated  History Lipid Disorder      Other Preventive Screenings Covered by Medicare:  1  Abdominal Aortic Aneurysm (AAA) Screening: covered once if your at risk  You're considered to be at risk if you have a family history of AAA or a male between the age of 73-68 who smoking at least 100 cigarettes in your lifetime  2  Lung Cancer Screening: covers low dose CT scan once per year if you meet all of the following conditions: (1) Age 50-69; (2) No signs or symptoms of lung cancer; (3) Current smoker or have quit smoking within the last 15 years; (4) You have a tobacco smoking history of at least 30 pack years (packs per day x number of years you smoked); (5) You get a written order from a healthcare provider  3  Glaucoma Screening: covered annually if you're considered high risk: (1) You have diabetes OR (2) Family history of glaucoma OR (3)  aged 48 and older OR (3)  American aged 72 and older  3  Osteoporosis Screening: covered every 2 years if you meet one of the following conditions: (1) Have a vertebral abnormality; (2) On glucocorticoid therapy for more than 3 months; (3) Have primary hyperparathyroidism; (4) On osteoporosis medications and need to assess response to drug therapy  5  HIV Screening: covered annually if you're between the age of 12-76  Also covered annually if you are younger than 13 and older than 72 with risk factors for HIV infection  For pregnant patients, it is covered up to 3 times per pregnancy      Immunizations:  Immunization Recommendations   Influenza Vaccine Annual influenza vaccination during flu season is recommended for all persons aged >= 6 months who do not have contraindications   Pneumococcal Vaccine (Prevnar and Pneumovax)  * Prevnar = PCV13  * Pneumovax = PPSV23 Adults 25-60 years old: 1-3 doses may be recommended based on certain risk factors  Adults 72 years old: Prevnar (PCV13) vaccine recommended followed by Pneumovax (PPSV23) vaccine  If already received PPSV23 since turning 65, then PCV13 recommended at least one year after PPSV23 dose  Hepatitis B Vaccine 3 dose series if at intermediate or high risk (ex: diabetes, end stage renal disease, liver disease)   Tetanus (Td) Vaccine - COST NOT COVERED BY MEDICARE PART B Following completion of primary series, a booster dose should be given every 10 years to maintain immunity against tetanus  Td may also be given as tetanus wound prophylaxis  Tdap Vaccine - COST NOT COVERED BY MEDICARE PART B Recommended at least once for all adults  For pregnant patients, recommended with each pregnancy  Shingles Vaccine (Shingrix) - COST NOT COVERED BY MEDICARE PART B  2 shot series recommended in those aged 48 and above     Health Maintenance Due:      Topic Date Due    Hepatitis C Screening  1955     Immunizations Due:      Topic Date Due    Influenza Vaccine  07/01/2020    Pneumococcal Vaccine: 65+ Years (2 of 2 - PPSV23) 08/21/2020     Advance Directives   What are advance directives? Advance directives are legal documents that state your wishes and plans for medical care  These plans are made ahead of time in case you lose your ability to make decisions for yourself  Advance directives can apply to any medical decision, such as the treatments you want, and if you want to donate organs  What are the types of advance directives? There are many types of advance directives, and each state has rules about how to use them  You may choose a combination of any of the following:  · Living will: This is a written record of the treatment you want  You can also choose which treatments you do not want, which to limit, and which to stop at a certain time  This includes surgery, medicine, IV fluid, and tube feedings  · Durable power of  for healthcare [facility]):   This is a written record that states who you want to make healthcare choices for you when you are unable to make them for yourself  This person, called a proxy, is usually a family member or a friend  You may choose more than 1 proxy  · Do not resuscitate (DNR) order:  A DNR order is used in case your heart stops beating or you stop breathing  It is a request not to have certain forms of treatment, such as CPR  A DNR order may be included in other types of advance directives  · Medical directive: This covers the care that you want if you are in a coma, near death, or unable to make decisions for yourself  You can list the treatments you want for each condition  Treatment may include pain medicine, surgery, blood transfusions, dialysis, IV or tube feedings, and a ventilator (breathing machine)  · Values history: This document has questions about your views, beliefs, and how you feel and think about life  This information can help others choose the care that you would choose  Why are advance directives important? An advance directive helps you control your care  Although spoken wishes may be used, it is better to have your wishes written down  Spoken wishes can be misunderstood, or not followed  Treatments may be given even if you do not want them  An advance directive may make it easier for your family to make difficult choices about your care  Cigarette Smoking and Your Health   Risks to your health if you smoke:  Nicotine and other chemicals found in tobacco damage every cell in your body  Even if you are a light smoker, you have an increased risk for cancer, heart disease, and lung disease  If you are pregnant or have diabetes, smoking increases your risk for complications  Benefits to your health if you stop smoking:   · You decrease respiratory symptoms such as coughing, wheezing, and shortness of breath  · You reduce your risk for cancers of the lung, mouth, throat, kidney, bladder, pancreas, stomach, and cervix   If you already have cancer, you increase the benefits of chemotherapy  You also reduce your risk for cancer returning or a second cancer from developing  · You reduce your risk for heart disease, blood clots, heart attack, and stroke  · You reduce your risk for lung infections, and diseases such as pneumonia, asthma, chronic bronchitis, and emphysema  · Your circulation improves  More oxygen can be delivered to your body  If you have diabetes, you lower your risk for complications, such as kidney, artery, and eye diseases  You also lower your risk for nerve damage  Nerve damage can lead to amputations, poor vision, and blindness  · You improve your body's ability to heal and to fight infections  For more information and support to stop smoking:   · [employer]  Phone: 9- 027 - 458-7585  Web Address: [employer]  Weight Management   Why it is important to manage your weight:  Being overweight increases your risk of health conditions such as heart disease, high blood pressure, type 2 diabetes, and certain types of cancer  It can also increase your risk for osteoarthritis, sleep apnea, and other respiratory problems  Aim for a slow, steady weight loss  Even a small amount of weight loss can lower your risk of health problems  How to lose weight safely:  A safe and healthy way to lose weight is to eat fewer calories and get regular exercise  You can lose up about 1 pound a week by decreasing the number of calories you eat by 500 calories each day  Healthy meal plan for weight management:  A healthy meal plan includes a variety of foods, contains fewer calories, and helps you stay healthy  A healthy meal plan includes the following:  · Eat whole-grain foods more often  A healthy meal plan should contain fiber  Fiber is the part of grains, fruits, and vegetables that is not broken down by your body  Whole-grain foods are healthy and provide extra fiber in your diet   Some examples of whole-grain foods are whole-wheat breads and pastas, oatmeal, brown rice, and bulgur  · Eat a variety of vegetables every day  Include dark, leafy greens such as spinach, kale, priscila greens, and mustard greens  Eat yellow and orange vegetables such as carrots, sweet potatoes, and winter squash  · Eat a variety of fruits every day  Choose fresh or canned fruit (canned in its own juice or light syrup) instead of juice  Fruit juice has very little or no fiber  · Eat low-fat dairy foods  Drink fat-free (skim) milk or 1% milk  Eat fat-free yogurt and low-fat cottage cheese  Try low-fat cheeses such as mozzarella and other reduced-fat cheeses  · Choose meat and other protein foods that are low in fat  Choose beans or other legumes such as split peas or lentils  Choose fish, skinless poultry (chicken or turkey), or lean cuts of red meat (beef or pork)  Before you cook meat or poultry, cut off any visible fat  · Use less fat and oil  Try baking foods instead of frying them  Add less fat, such as margarine, sour cream, regular salad dressing and mayonnaise to foods  Eat fewer high-fat foods  Some examples of high-fat foods include french fries, doughnuts, ice cream, and cakes  · Eat fewer sweets  Limit foods and drinks that are high in sugar  This includes candy, cookies, regular soda, and sweetened drinks  Exercise:  Exercise at least 30 minutes per day on most days of the week  Some examples of exercise include walking, biking, dancing, and swimming  You can also fit in more physical activity by taking the stairs instead of the elevator or parking farther away from stores  Ask your healthcare provider about the best exercise plan for you  © Copyright [employer] 2018 Information is for End User's use only and may not be sold, redistributed or otherwise used for commercial purposes   All illustrations and images included in CareNotes® are the copyrighted property of A D A M [employer]  or [address] [place] [address] [employer]

## 2020-09-02 NOTE — PROGRESS NOTES
Assessment and Plan:     Problem List Items Addressed This Visit        Digestive    Fatty liver       Endocrine    Diabetes mellitus, type 2 ([facility] [address] ) - Primary    Relevant Orders    Microalbumin / creatinine urine ratio    Hemoglobin A1C       Respiratory    Obstructive sleep apnea       Cardiovascular and Mediastinum    Benign essential hypertension    Relevant Medications    lisinopril (ZESTRIL) 20 mg tablet    Other Relevant Orders    CBC and differential    Comprehensive metabolic panel       Other    Mixed hyperlipidemia    Relevant Medications    atorvastatin (LIPITOR) 10 mg tablet    Other Relevant Orders    Lipid panel      Other Visit Diagnoses     Initial Medicare annual wellness visit        Screening for prostate cancer        Relevant Orders    PSA, Total Screen    Need for pneumococcal vaccination        Relevant Orders    PNEUMOCOCCAL POLYSACCHARIDE VACCINE 23-VALENT =>1YO SQ IM (Completed)    Need for influenza vaccination        Relevant Orders    influenza vaccine, high-dose, PF 0 7 mL (FLUZONE HIGH-DOSE) (Completed)    Need for hepatitis C screening test        Relevant Orders    Hepatitis C antibody          Tobacco Cessation Counseling: Tobacco cessation counseling was provided  The patient is sincerely urged to quit consumption of tobacco  He is not ready to quit tobacco      Preventive health issues were discussed with patient, and age appropriate screening tests were ordered as noted in patient's After Visit Summary  Personalized health advice and appropriate referrals for health education or preventive services given if needed, as noted in patient's After Visit Summary       History of Present Illness:     Patient presents for Medicare Annual Wellness visit    Patient Care Team:  Tiffanie Pettit MD as PCP - Martín Montalvo MD (General Surgery)  Linda Tyler MD (Colon and Rectal Surgery)     Problem List:     Patient Active Problem List   Diagnosis    Benign essential hypertension    Diabetes mellitus, type 2 ([facility] )    Mixed hyperlipidemia    Obstructive sleep apnea    ED (erectile dysfunction)    Fatty liver    Renal calculus    Tobacco abuse    Impotence of organic origin      Past Medical and Surgical History:     Past Medical History:   Diagnosis Date    Acute pancreatitis     last assessed 7/20/17    Candidal intertrigo     last assessed 7/20/17    Cholelithiasis     last assessed 7/20/17    Elevated liver enzymes     last assessed 8/29/16    Gall stone pancreatitis 8/23/2016    GERD (gastroesophageal reflux disease) 7/20/2016    Hydronephrosis, left 8/25/2016    Impaired fasting glucose     last assessed 1/18/16    Nephrolithiasis     last assessed 3/18/13    Pancreatitis, acute 7/2/2017    Pericardial effusion     last assessed 8/29/16    Pulmonary emboli ([facility] ) 6/10/2018     Past Surgical History:   Procedure Laterality Date    HEMORRHOID SURGERY      hemorrhoidectomy    KIDNEY SURGERY Right     LAPAROSCOPIC CHOLECYSTECTOMY      S/P,last assessed 7/20/17    LITHOTRIPSY      renal      Family History:     Family History   Problem Relation Age of Onset    Diabetes type II Father       Social History:        Social History     Socioeconomic History    Marital status:       Spouse name: None    Number of children: None    Years of education: None    Highest education level: None   Occupational History    None   Social Needs    Financial resource strain: None    Food insecurity     Worry: None     Inability: None    Transportation needs     Medical: None     Non-medical: None   Tobacco Use    Smoking status: Current Every Day Smoker     Packs/day: 0 75    Smokeless tobacco: Never Used   Substance and Sexual Activity    Alcohol use: No    Drug use: No    Sexual activity: None   Lifestyle    Physical activity     Days per week: None     Minutes per session: None    Stress: None   Relationships    Social connections     Talks on phone: None     Gets together: None     Attends Sabianist service: None     Active member of club or organization: None     Attends meetings of clubs or organizations: None     Relationship status: None    Intimate partner violence     Fear of current or ex partner: None     Emotionally abused: None     Physically abused: None     Forced sexual activity: None   Other Topics Concern    None   Social History Narrative    None      Medications and Allergies:     Current Outpatient Medications   Medication Sig Dispense Refill    aspirin (ECOTRIN LOW STRENGTH) 81 mg EC tablet Take 81 mg by mouth daily      atorvastatin (LIPITOR) 10 mg tablet Take 1 tablet (10 mg total) by mouth daily 90 tablet 3    lisinopril (ZESTRIL) 20 mg tablet Take 1 tablet (20 mg total) by mouth daily 90 tablet 3    multivitamin (THERAGRAN) TABS Take 1 tablet by mouth      Omega-3 Fatty Acids (FISH OIL PO) Take 1 g by mouth daily       No current facility-administered medications for this visit  No Known Allergies   Immunizations:     Immunization History   Administered Date(s) Administered    INFLUENZA 10/28/2019    Influenza Split High Dose Preservative Free IM 10/28/2019    Influenza, high dose seasonal 0 7 mL 10/28/2019, 09/02/2020    Pneumococcal Conjugate 13-Valent 08/21/2019    Pneumococcal Polysaccharide PPV23 09/02/2020    Tdap 08/21/2011      Health Maintenance:         Topic Date Due    Hepatitis C Screening  1955         Topic Date Due    Influenza Vaccine  07/01/2020    Pneumococcal Vaccine: 65+ Years (2 of 2 - PPSV23) 08/21/2020      Medicare Health Risk Assessment:     /64   Pulse 62   Temp 98 9 °F (37 2 °C)   Resp 16   Ht 5' 9 8" (1 773 m)   Wt 108 kg (238 lb)   BMI 34 35 kg/m²      Grid20/20 is here for his Initial Wellness visit  Last Medicare Wellness visit information reviewed, patient interviewed and updates made to the record today        Health Risk Assessment:   Patient rates overall health as very good  Patient feels that their physical health rating is same  Eyesight was rated as same  Hearing was rated as same  Patient feels that their emotional and mental health rating is same  Pain experienced in the last 7 days has been none  Patient states that he has experienced no weight loss or gain in last 6 months  Depression Screening:   PHQ-2 Score: 0      Fall Risk Screening: In the past year, patient has experienced: no history of falling in past year      Home Safety:  Patient does not have trouble with stairs inside or outside of their home  Patient has working smoke alarms and has working carbon monoxide detector  Home safety hazards include: none  Nutrition:   Current diet is Regular  Medications:   Patient is currently taking over-the-counter supplements  OTC medications include: see medication list  Patient is able to manage medications  Activities of Daily Living (ADLs)/Instrumental Activities of Daily Living (IADLs):   Walk and transfer into and out of bed and chair?: Yes  Dress and groom yourself?: Yes    Bathe or shower yourself?: Yes    Feed yourself?  Yes  Do your laundry/housekeeping?: Yes  Manage your money, pay your bills and track your expenses?: Yes  Make your own meals?: Yes    Do your own shopping?: Yes    Previous Hospitalizations:   Any hospitalizations or ED visits within the last 12 months?: No      Advance Care Planning:   Living will: No    Advanced directive: No      Cognitive Screening:   Provider or family/friend/caregiver concerned regarding cognition?: No    PREVENTIVE SCREENINGS      Cardiovascular Screening:    General: Screening Not Indicated and History Lipid Disorder      Diabetes Screening:     General: Screening Not Indicated and History Diabetes      Colorectal Cancer Screening:     General: Risks and Benefits Discussed and Patient Declines      Prostate Cancer Screening:      Due for: PSA      Osteoporosis Screening:    General: Screening Not Indicated      Abdominal Aortic Aneurysm (AAA) Screening:    Risk factors include: age between 73-67 yo and tobacco use        General: Screening Not Indicated      Lung Cancer Screening:     General: Risks and Benefits Discussed and Patient Declines      Hepatitis C Screening:    General: Risks and Benefits Discussed    Other Counseling Topics:   Calcium and vitamin D intake and regular weightbearing exercise         Alexis Melton MD

## 2021-02-13 DIAGNOSIS — Z23 ENCOUNTER FOR IMMUNIZATION: ICD-10-CM

## 2021-03-04 ENCOUNTER — TELEPHONE (OUTPATIENT)
Dept: FAMILY MEDICINE CLINIC | Facility: CLINIC | Age: 66
End: 2021-03-04

## 2021-03-04 LAB
CREAT ?TM UR-SCNC: 76.2 UMOL/L
EXT MICROALBUMIN URINE RANDOM: 1.7
HBA1C MFR BLD HPLC: 6.3 %
HCV AB SER-ACNC: NEGATIVE
MICROALBUMIN/CREAT UR: 22.3 MG/G{CREAT}

## 2021-03-04 NOTE — TELEPHONE ENCOUNTER
----- Message from Janette Lindsay sent at 3/4/2021 11:42 AM EST -----  Regarding: Non-Urgent Medical Question  Contact: [phone]  I had an appointment at [facility] on February 16, 2021 for a COVID vaccine but it was canceled due to weather  I have not heard back  Can you schedule an appointment for my vaccine  I do fall under the category of   High blood pressure, smoker, obesity  Thank you

## 2021-03-05 NOTE — TELEPHONE ENCOUNTER
Spoke to patient's spouse and informed her to call to continue to call [phone]   She states that she will

## 2021-03-11 ENCOUNTER — IMMUNIZATIONS (OUTPATIENT)
Dept: FAMILY MEDICINE CLINIC | Facility: HOSPITAL | Age: 66
End: 2021-03-11

## 2021-03-11 DIAGNOSIS — Z23 ENCOUNTER FOR IMMUNIZATION: Primary | ICD-10-CM

## 2021-03-11 PROCEDURE — 91300 SARS-COV-2 / COVID-19 MRNA VACCINE (PFIZER-BIONTECH) 30 MCG: CPT | Performed by: PHYSICIAN ASSISTANT

## 2021-03-11 PROCEDURE — 0001A SARS-COV-2 / COVID-19 MRNA VACCINE (PFIZER-BIONTECH) 30 MCG: CPT | Performed by: PHYSICIAN ASSISTANT

## 2021-03-17 ENCOUNTER — OFFICE VISIT (OUTPATIENT)
Dept: FAMILY MEDICINE CLINIC | Facility: CLINIC | Age: 66
End: 2021-03-17
Payer: MEDICARE

## 2021-03-17 VITALS
SYSTOLIC BLOOD PRESSURE: 132 MMHG | HEIGHT: 70 IN | TEMPERATURE: 96.8 F | RESPIRATION RATE: 16 BRPM | DIASTOLIC BLOOD PRESSURE: 76 MMHG | BODY MASS INDEX: 35.5 KG/M2 | HEART RATE: 76 BPM | WEIGHT: 248 LBS

## 2021-03-17 DIAGNOSIS — E78.2 MIXED HYPERLIPIDEMIA: ICD-10-CM

## 2021-03-17 DIAGNOSIS — E66.01 OBESITY, MORBID (HCC): ICD-10-CM

## 2021-03-17 DIAGNOSIS — I10 BENIGN ESSENTIAL HYPERTENSION: ICD-10-CM

## 2021-03-17 DIAGNOSIS — E11.9 TYPE 2 DIABETES MELLITUS WITHOUT COMPLICATION, WITHOUT LONG-TERM CURRENT USE OF INSULIN (HCC): Primary | ICD-10-CM

## 2021-03-17 DIAGNOSIS — K76.0 FATTY LIVER: ICD-10-CM

## 2021-03-17 DIAGNOSIS — Z86.711 HISTORY OF PULMONARY EMBOLUS (PE): ICD-10-CM

## 2021-03-17 PROBLEM — I50.32 CHRONIC DIASTOLIC CHF (CONGESTIVE HEART FAILURE) (HCC): Status: ACTIVE | Noted: 2017-07-02

## 2021-03-17 PROBLEM — I50.32 CHRONIC DIASTOLIC CHF (CONGESTIVE HEART FAILURE) (HCC): Status: RESOLVED | Noted: 2017-07-02 | Resolved: 2021-03-17

## 2021-03-17 PROCEDURE — 99214 OFFICE O/P EST MOD 30 MIN: CPT | Performed by: FAMILY MEDICINE

## 2021-03-17 NOTE — PROGRESS NOTES
Assessment/Plan:         Diagnoses and all orders for this visit:    Type 2 diabetes mellitus without complication, without long-term current use of insulin (HCC)  -     Hemoglobin A1C    Benign essential hypertension  -     Comprehensive metabolic panel  -     CBC and differential    Mixed hyperlipidemia  -     Lipid panel    Fatty liver    Obesity, morbid ([address] )    History of pulmonary embolus (PE)          Continue with current medications  Office visit 6 months with repeat labs at that time  Patient has received his 1st COVID-19 vaccine     Patient ID: Gisele Rhodes is a 77 y o  male  Followup visit  Medications reviewed  Type 2 DM diet controlled  03/2021 FBS 99  A1c 6 3  Urine albumin/creatinine ratio normal at 22  3  on ACE  no DPN  Current with eye exam 08/2019 no retinopathy  Hypertension blood pressures have been stable on Lisinopril 10 mg daily  03/2021 creatinine 0 88  Electrolytes normal except for Na+ 134 and bicarb 20  Hgb 13 2  Hyperlipidemia mixed type on Atorvastatin 10 mg daily and 2 fish oils/day   Lipid profile 03/2021 cholesterol 119  Triglycerides  130 decreased from 176  HDL 31  LDL 88  LFTs normal   08/2019 TSH 1 500  The following portions of the patient's history were reviewed and updated as appropriate: allergies, current medications, past family history, past medical history, past social history, past surgical history and problem list     Review of Systems   Constitutional: Positive for unexpected weight change (10 lb weight gain from 09/2020)  Negative for appetite change, chills, fatigue and fever  HENT: Negative for congestion, ear pain, hearing loss, rhinorrhea, sore throat and trouble swallowing  Eyes: Negative for visual disturbance  Respiratory: Positive for apnea  Negative for cough, shortness of breath and wheezing  OSMAN he uses CPAP   S/p admission 06/2018 for bilateral pulmonary emboli     Venous Dopplers were not done in the hospital   PE unprovoked with no history of recent travel  no hospitalizations or surgeries  No family history of DVT/PE  No personal history of DVT or pulmonary embolus  No cancer history  Colonoscopy 04/2013  Workup in the hospital chest x-ray small area of infiltrate/atelectasis left lung base and small left effusion  Probable minimal right pleural effusion  CT scan abdomen and pelvis without contrast similar mild to moderate left hydronephrosis  left renal pelvic calculi seen  Small bilateral effusions and bilateral dependent subsegmental atelectasis  Normal liver, spleen and pancreas  No lymphadenopathy  CT scan chest showed bilateral pulmonary emboli left greater than right in the lower lobes  Pleural effusions left greater than right  Basal atelectasis  Possibly small left lower lobe pulmonary infarct  No evidence of right cardiac pressure overload  Mild pericardial effusion  Echocardiogram normal left ventricular chamber size normal left ventricular systolic function  No regional wall motion abnormalities  Mild concentric LV H  EF 60%  Moderate tricuspid regurgitation  Normal pulmonary artery systolic blood pressure  Mild diastolic dysfunction with normal filling pressures  Small pericardial effusion without evidence of hemodynamic compromise  EKG normal sinus rhythm no acute changes  Patient completed 6 months of Eliquis  07/2018 prothrombin mutation gene and Factor V Leidin negative  08/2019 D dimer < 0 27   Cardiovascular: Negative for chest pain, palpitations and leg swelling  Gastrointestinal: Negative for abdominal pain, blood in stool, constipation, diarrhea, nausea and vomiting  Colonoscopy 04/2013  Endocrine: Negative for polydipsia and polyuria  Genitourinary: Negative for difficulty urinating         03/2021 PSA 0 75   Musculoskeletal: Negative for arthralgias and myalgias  Skin: Negative for rash  Allergic/Immunologic: Negative for environmental allergies     Neurological: Negative for dizziness and headaches  Hematological: Negative for adenopathy  Does not bruise/bleed easily  Psychiatric/Behavioral: Negative for dysphoric mood and sleep disturbance  The patient is not nervous/anxious  Objective:      /76   Pulse 76   Temp (!) 96 8 °F (36 °C)   Resp 16   Ht 5' 9 8" (1 773 m)   Wt 112 kg (248 lb)   BMI 35 79 kg/m²     BP Readings from Last 3 Encounters:   03/17/21 132/76   09/02/20 118/64   02/24/20 120/64       Wt Readings from Last 3 Encounters:   03/17/21 112 kg (248 lb)   09/02/20 108 kg (238 lb)   02/24/20 111 kg (245 lb)        Physical Exam  Vitals signs and nursing note reviewed  Constitutional:       General: He is not in acute distress  Appearance: He is well-developed  HENT:      Right Ear: Tympanic membrane normal       Left Ear: Tympanic membrane normal    Eyes:      General: No scleral icterus  Extraocular Movements: Extraocular movements intact  Conjunctiva/sclera: Conjunctivae normal       Pupils: Pupils are equal, round, and reactive to light  Neck:      Thyroid: No thyroid mass or thyromegaly  Vascular: No carotid bruit or JVD  Trachea: No tracheal deviation  Cardiovascular:      Rate and Rhythm: Normal rate and regular rhythm  Pulses: no weak pulses          Carotid pulses are 2+ on the right side and 2+ on the left side  Dorsalis pedis pulses are 2+ on the right side and 2+ on the left side  Posterior tibial pulses are 2+ on the right side and 2+ on the left side  Heart sounds: Normal heart sounds  No murmur  No gallop  Pulmonary:      Effort: Pulmonary effort is normal  No respiratory distress  Breath sounds: Normal breath sounds  No wheezing or rales  Abdominal:      General: Bowel sounds are normal  There is no distension or abdominal bruit  Palpations: Abdomen is soft  There is no hepatomegaly, splenomegaly or mass  Tenderness: There is no abdominal tenderness   There is no guarding or rebound  Musculoskeletal:      Right lower leg: No edema  Left lower leg: No edema  Feet:      Right foot:      Skin integrity: No ulcer, skin breakdown, erythema, warmth, callus or dry skin  Left foot:      Skin integrity: Callus (small callus left heel ) present  No ulcer, skin breakdown, erythema, warmth or dry skin  Lymphadenopathy:      Cervical: No cervical adenopathy  Upper Body:      Right upper body: No supraclavicular adenopathy  Left upper body: No supraclavicular adenopathy  Skin:     Findings: No rash  Nails: There is no clubbing  Neurological:      General: No focal deficit present  Mental Status: He is alert and oriented to person, place, and time  Psychiatric:         Mood and Affect: Mood normal          Patient's shoes and socks removed  Right Foot/Ankle   Right Foot Inspection  Skin Exam: skin normal and skin intact no dry skin, no warmth, no callus, no erythema, no maceration, no abnormal color, no pre-ulcer, no ulcer and no callus                          Toe Exam: ROM and strength within normal limits  Sensory       Monofilament testing: intact  Vascular  Capillary refills: < 3 seconds  The right DP pulse is 2+  The right PT pulse is 2+  Left Foot/Ankle  Left Foot Inspection  Skin Exam: skin normal, skin intact and callus (small callus left heel )no dry skin, no warmth, no erythema, no maceration, normal color, no pre-ulcer and no ulcer                         Toe Exam: ROM and strength within normal limits                   Sensory       Monofilament: intact  Vascular  Capillary refills: < 3 seconds  The left DP pulse is 2+  The left PT pulse is 2+  Assign Risk Category:  No deformity present; No loss of protective sensation;  No weak pulses       Risk: 0

## 2021-04-01 ENCOUNTER — IMMUNIZATIONS (OUTPATIENT)
Dept: FAMILY MEDICINE CLINIC | Facility: HOSPITAL | Age: 66
End: 2021-04-01

## 2021-04-01 DIAGNOSIS — Z23 ENCOUNTER FOR IMMUNIZATION: Primary | ICD-10-CM

## 2021-04-01 PROCEDURE — 91300 SARS-COV-2 / COVID-19 MRNA VACCINE (PFIZER-BIONTECH) 30 MCG: CPT

## 2021-04-01 PROCEDURE — 0002A SARS-COV-2 / COVID-19 MRNA VACCINE (PFIZER-BIONTECH) 30 MCG: CPT

## 2021-07-23 DIAGNOSIS — I10 BENIGN ESSENTIAL HYPERTENSION: ICD-10-CM

## 2021-07-23 RX ORDER — LISINOPRIL 20 MG/1
TABLET ORAL
Qty: 90 TABLET | Refills: 3 | Status: SHIPPED | OUTPATIENT
Start: 2021-07-23 | End: 2021-09-21 | Stop reason: SDUPTHER

## 2021-09-21 ENCOUNTER — OFFICE VISIT (OUTPATIENT)
Dept: FAMILY MEDICINE CLINIC | Facility: CLINIC | Age: 66
End: 2021-09-21
Payer: MEDICARE

## 2021-09-21 ENCOUNTER — TELEPHONE (OUTPATIENT)
Dept: FAMILY MEDICINE CLINIC | Facility: CLINIC | Age: 66
End: 2021-09-21

## 2021-09-21 VITALS
SYSTOLIC BLOOD PRESSURE: 126 MMHG | RESPIRATION RATE: 16 BRPM | WEIGHT: 236 LBS | HEIGHT: 70 IN | TEMPERATURE: 97.1 F | BODY MASS INDEX: 33.79 KG/M2 | DIASTOLIC BLOOD PRESSURE: 74 MMHG | HEART RATE: 72 BPM

## 2021-09-21 DIAGNOSIS — E11.9 TYPE 2 DIABETES MELLITUS WITHOUT COMPLICATION, WITHOUT LONG-TERM CURRENT USE OF INSULIN (HCC): Primary | ICD-10-CM

## 2021-09-21 DIAGNOSIS — E78.2 MIXED HYPERLIPIDEMIA: ICD-10-CM

## 2021-09-21 DIAGNOSIS — Z00.00 MEDICARE ANNUAL WELLNESS VISIT, SUBSEQUENT: ICD-10-CM

## 2021-09-21 DIAGNOSIS — K76.0 FATTY LIVER: ICD-10-CM

## 2021-09-21 DIAGNOSIS — G47.33 OBSTRUCTIVE SLEEP APNEA: ICD-10-CM

## 2021-09-21 DIAGNOSIS — Z12.5 SCREENING FOR PROSTATE CANCER: ICD-10-CM

## 2021-09-21 DIAGNOSIS — E66.01 OBESITY, MORBID (HCC): ICD-10-CM

## 2021-09-21 DIAGNOSIS — Z86.711 HISTORY OF PULMONARY EMBOLUS (PE): ICD-10-CM

## 2021-09-21 DIAGNOSIS — I10 BENIGN ESSENTIAL HYPERTENSION: ICD-10-CM

## 2021-09-21 PROCEDURE — G0439 PPPS, SUBSEQ VISIT: HCPCS | Performed by: FAMILY MEDICINE

## 2021-09-21 PROCEDURE — 1123F ACP DISCUSS/DSCN MKR DOCD: CPT | Performed by: FAMILY MEDICINE

## 2021-09-21 PROCEDURE — 99214 OFFICE O/P EST MOD 30 MIN: CPT | Performed by: FAMILY MEDICINE

## 2021-09-21 RX ORDER — LISINOPRIL 20 MG/1
20 TABLET ORAL DAILY
Qty: 90 TABLET | Refills: 3 | Status: SHIPPED | OUTPATIENT
Start: 2021-09-21 | End: 2022-03-24 | Stop reason: SDUPTHER

## 2021-09-21 RX ORDER — ATORVASTATIN CALCIUM 10 MG/1
10 TABLET, FILM COATED ORAL DAILY
Qty: 90 TABLET | Refills: 3 | Status: SHIPPED | OUTPATIENT
Start: 2021-09-21 | End: 2022-03-24 | Stop reason: SDUPTHER

## 2021-09-21 NOTE — TELEPHONE ENCOUNTER
----- Message from Filiberto Peace MD sent at 9/21/2021 11:10 AM EDT -----  Patient did have a colonoscopy in 2013

## 2021-09-21 NOTE — PROGRESS NOTES
Assessment and Plan:     Problem List Items Addressed This Visit        Digestive    Fatty liver       Endocrine    Diabetes mellitus, type 2 ([facility] ) - Primary    Relevant Orders    Hemoglobin A1C    Microalbumin / creatinine urine ratio       Respiratory    Obstructive sleep apnea       Cardiovascular and Mediastinum    Benign essential hypertension    Relevant Medications    lisinopril (ZESTRIL) 20 mg tablet    Other Relevant Orders    Comprehensive metabolic panel    CBC and differential       Other    Mixed hyperlipidemia    Relevant Medications    atorvastatin (LIPITOR) 10 mg tablet    Other Relevant Orders    Lipid panel    Obesity, morbid ([facility] )    History of pulmonary embolus (PE)      Other Visit Diagnoses     Medicare annual wellness visit, subsequent        Screening for prostate cancer        Relevant Orders    PSA, Total Screen        BMI Counseling: Body mass index is 34 06 kg/m²  The BMI is above normal  Nutrition recommendations include decreasing portion sizes, consuming healthier snacks, moderation in carbohydrate intake, reducing intake of saturated and trans fat and reducing intake of cholesterol  Exercise recommendations include exercising 3-5 times per week  No pharmacotherapy was ordered  Rationale for BMI follow-up plan is due to patient being overweight or obese  Depression Screening and Follow-up Plan:   Patient was screened for depression during today's encounter  They screened negative with a PHQ-2 score of 0  Preventive health issues were discussed with patient, and age appropriate screening tests were ordered as noted in patient's After Visit Summary  Personalized health advice and appropriate referrals for health education or preventive services given if needed, as noted in patient's After Visit Summary       History of Present Illness:     Patient presents for Medicare Annual Wellness visit    Patient Care Team:  Rudy Adams MD as PCP - Oz Duran MD (General Surgery)  Lisset Coles MD (Colon and Rectal Surgery)     Problem List:     Patient Active Problem List   Diagnosis    Benign essential hypertension    Diabetes mellitus, type 2 ([facility] )    Mixed hyperlipidemia    Obstructive sleep apnea    ED (erectile dysfunction)    Fatty liver    Renal calculus    Tobacco abuse    Impotence of organic origin    Obesity, morbid ([facility] )    History of pulmonary embolus (PE)      Past Medical and Surgical History:     Past Medical History:   Diagnosis Date    Acute pancreatitis     last assessed 7/20/17    Candidal intertrigo     last assessed 7/20/17    Cholelithiasis     last assessed 7/20/17    Elevated liver enzymes     last assessed 8/29/16    Derrick Juniper stone pancreatitis 8/23/2016    GERD (gastroesophageal reflux disease) 7/20/2016    Hydronephrosis, left 8/25/2016    Impaired fasting glucose     last assessed 1/18/16    Nephrolithiasis     last assessed 3/18/13    Pancreatitis, acute 7/2/2017    Pericardial effusion     last assessed 8/29/16    Pulmonary emboli ([facility] ) 6/10/2018     Past Surgical History:   Procedure Laterality Date    HEMORRHOID SURGERY      hemorrhoidectomy    KIDNEY SURGERY Right     LAPAROSCOPIC CHOLECYSTECTOMY      S/P,last assessed 7/20/17    LITHOTRIPSY      renal      Family History:     Family History   Problem Relation Age of Onset    Diabetes type II Father       Social History:     Social History     Socioeconomic History    Marital status:       Spouse name: None    Number of children: None    Years of education: None    Highest education level: None   Occupational History    None   Tobacco Use    Smoking status: Current Every Day Smoker     Packs/day: 0 75    Smokeless tobacco: Never Used   Substance and Sexual Activity    Alcohol use: No    Drug use: No    Sexual activity: None   Other Topics Concern    None   Social History Narrative    None     Social Determinants of Health     Financial Resource Strain:     Difficulty of Paying Living Expenses:    Food Insecurity:     Worried About Running Out of Food in the Last Year:     [address] in the Last Year:    Transportation Needs:     Lack of Transportation (Medical):  Lack of Transportation (Non-Medical):    Physical Activity:     Days of Exercise per Week:     Minutes of Exercise per Session:    Stress:     Feeling of Stress :    Social Connections:     Frequency of Communication with Friends and Family:     Frequency of Social Gatherings with Friends and Family:     Attends [employer] Services:     Active Member of Clubs or Organizations:     Attends Club or Organization Meetings:     Marital Status:    Intimate Partner Violence:     Fear of Current or Ex-Partner:     Emotionally Abused:     Physically Abused:     Sexually Abused:       Medications and Allergies:     Current Outpatient Medications   Medication Sig Dispense Refill    aspirin (ECOTRIN LOW STRENGTH) 81 mg EC tablet Take 81 mg by mouth daily      atorvastatin (LIPITOR) 10 mg tablet Take 1 tablet (10 mg total) by mouth daily 90 tablet 3    lisinopril (ZESTRIL) 20 mg tablet Take 1 tablet (20 mg total) by mouth daily 90 tablet 3    multivitamin (THERAGRAN) TABS Take 1 tablet by mouth      Omega-3 Fatty Acids (FISH OIL PO) Take 1 g by mouth daily       No current facility-administered medications for this visit       No Known Allergies   Immunizations:     Immunization History   Administered Date(s) Administered    INFLUENZA 10/28/2019    Influenza Split High Dose Preservative Free IM 10/28/2019    Influenza, high dose seasonal 0 7 mL 10/28/2019, 09/02/2020    Pneumococcal Conjugate 13-Valent 08/21/2019    Pneumococcal Polysaccharide PPV23 09/02/2020    SARS-CoV-2 / COVID-19 mRNA IM (Pfizer-BioNTech) 03/11/2021, 04/01/2021    Tdap 08/21/2011      Health Maintenance:         Topic Date Due    Colorectal Cancer Screening  Never done    Hepatitis C Screening  Completed         Topic Date Due    DTaP,Tdap,and Td Vaccines (2 - Td or Tdap) 08/21/2021    Influenza Vaccine (1) 09/01/2021      Medicare Health Risk Assessment:     /74   Pulse 72   Temp (!) 97 1 °F (36 2 °C)   Resp 16   Ht 5' 9 8" (1 773 m)   Wt 107 kg (236 lb)   BMI 34 06 kg/m²      Suni Babin is here for his Subsequent Wellness visit  Last Medicare Wellness visit information reviewed, patient interviewed and updates made to the record today  Health Risk Assessment:   Patient rates overall health as good  Patient feels that their physical health rating is same  Patient is very satisfied with their life  Eyesight was rated as same  Hearing was rated as same  Patient feels that their emotional and mental health rating is same  Patients states they are never, rarely angry  Patient states they are never, rarely unusually tired/fatigued  Pain experienced in the last 7 days has been none  Patient states that he has experienced no weight loss or gain in last 6 months  Depression Screening:   PHQ-2 Score: 0      Fall Risk Screening: In the past year, patient has experienced: no history of falling in past year      Home Safety:  Patient does not have trouble with stairs inside or outside of their home  Patient has working smoke alarms and has working carbon monoxide detector  Home safety hazards include: none  Nutrition:   Current diet is Regular  Medications:   Patient is currently taking over-the-counter supplements  OTC medications include: see medication list  Patient is able to manage medications  Activities of Daily Living (ADLs)/Instrumental Activities of Daily Living (IADLs):   Walk and transfer into and out of bed and chair?: Yes  Dress and groom yourself?: Yes    Bathe or shower yourself?: Yes    Feed yourself?  Yes  Do your laundry/housekeeping?: Yes  Manage your money, pay your bills and track your expenses?: Yes  Make your own meals?: Yes    Do your own shopping?: Yes    Previous Hospitalizations:   Any hospitalizations or ED visits within the last 12 months?: No      Advance Care Planning:   Living will: No    Advanced directive: No      Cognitive Screening:   Provider or family/friend/caregiver concerned regarding cognition?: No    PREVENTIVE SCREENINGS      Cardiovascular Screening:    General: Screening Not Indicated and History Lipid Disorder      Diabetes Screening:     General: Screening Not Indicated and History Diabetes      Colorectal Cancer Screening:     General: Risks and Benefits Discussed      Prostate Cancer Screening:    General: Screening Current      Abdominal Aortic Aneurysm (AAA) Screening:    Risk factors include: age between 73-67 yo and tobacco use        General: Screening Current      Lung Cancer Screening:     General: Risks and Benefits Discussed and Patient Declines      Hepatitis C Screening:    General: Screening Current    Screening, Brief Intervention, and Referral to Treatment (SBIRT)    Screening  Typical number of drinks in a day: 0  Typical number of drinks in a week: 0  Interpretation: Low risk drinking behavior  Single Item Drug Screening:  How often have you used an illegal drug (including marijuana) or a prescription medication for non-medical reasons in the past year? never    Single Item Drug Screen Score: 0  Interpretation: Negative screen for possible drug use disorder    Other Counseling Topics:   Skin self-exam, sunscreen and calcium and vitamin D intake and regular weightbearing exercise         Martín Garcia MD

## 2021-09-21 NOTE — PATIENT INSTRUCTIONS
Medicare Preventive Visit Patient Instructions  Thank you for completing your Welcome to Medicare Visit or Medicare Annual Wellness Visit today  Your next wellness visit will be due in one year (9/22/2022)  The screening/preventive services that you may require over the next 5-10 years are detailed below  Some tests may not apply to you based off risk factors and/or age  Screening tests ordered at today's visit but not completed yet may show as past due  Also, please note that scanned in results may not display below  Preventive Screenings:  Service Recommendations Previous Testing/Comments   Colorectal Cancer Screening  · Colonoscopy    · Fecal Occult Blood Test (FOBT)/Fecal Immunochemical Test (FIT)  · Fecal DNA/Cologuard Test  · Flexible Sigmoidoscopy Age: 54-65 years old   Colonoscopy: every 10 years (May be performed more frequently if at higher risk)  OR  FOBT/FIT: every 1 year  OR  Cologuard: every 3 years  OR  Sigmoidoscopy: every 5 years  Screening may be recommended earlier than age 48 if at higher risk for colorectal cancer  Also, an individualized decision between you and your healthcare provider will decide whether screening between the ages of 74-80 would be appropriate   Colonoscopy: Not on file  FOBT/FIT: Not on file  Cologuard: Not on file  Sigmoidoscopy: Not on file          Prostate Cancer Screening Individualized decision between patient and health care provider in men between ages of 53-78   Medicare will cover every 12 months beginning on the day after your 50th birthday PSA: No results in last 5 years           Hepatitis C Screening Once for adults born between 1945 and 1965  More frequently in patients at high risk for Hepatitis C Hep C Antibody: 03/04/2021    Screening Current   Diabetes Screening 1-2 times per year if you're at risk for diabetes or have pre-diabetes Fasting glucose: No results in last 5 years   A1C: 6 4 %    Screening Not Indicated  History Diabetes   Cholesterol Screening Once every 5 years if you don't have a lipid disorder  May order more often based on risk factors  Lipid panel: 09/14/2021    Screening Not Indicated  History Lipid Disorder      Other Preventive Screenings Covered by Medicare:  1  Abdominal Aortic Aneurysm (AAA) Screening: covered once if your at risk  You're considered to be at risk if you have a family history of AAA or a male between the age of 73-68 who smoking at least 100 cigarettes in your lifetime  2  Lung Cancer Screening: covers low dose CT scan once per year if you meet all of the following conditions: (1) Age 50-69; (2) No signs or symptoms of lung cancer; (3) Current smoker or have quit smoking within the last 15 years; (4) You have a tobacco smoking history of at least 30 pack years (packs per day x number of years you smoked); (5) You get a written order from a healthcare provider  3  Glaucoma Screening: covered annually if you're considered high risk: (1) You have diabetes OR (2) Family history of glaucoma OR (3)  aged 48 and older OR (3)  American aged 72 and older  3  Osteoporosis Screening: covered every 2 years if you meet one of the following conditions: (1) Have a vertebral abnormality; (2) On glucocorticoid therapy for more than 3 months; (3) Have primary hyperparathyroidism; (4) On osteoporosis medications and need to assess response to drug therapy  5  HIV Screening: covered annually if you're between the age of 12-76  Also covered annually if you are younger than 13 and older than 72 with risk factors for HIV infection  For pregnant patients, it is covered up to 3 times per pregnancy      Immunizations:  Immunization Recommendations   Influenza Vaccine Annual influenza vaccination during flu season is recommended for all persons aged >= 6 months who do not have contraindications   Pneumococcal Vaccine (Prevnar and Pneumovax)  * Prevnar = PCV13  * Pneumovax = PPSV23 Adults 25-60 years old: 1-3 doses may be recommended based on certain risk factors  Adults 72 years old: Prevnar (PCV13) vaccine recommended followed by Pneumovax (PPSV23) vaccine  If already received PPSV23 since turning 65, then PCV13 recommended at least one year after PPSV23 dose  Hepatitis B Vaccine 3 dose series if at intermediate or high risk (ex: diabetes, end stage renal disease, liver disease)   Tetanus (Td) Vaccine - COST NOT COVERED BY MEDICARE PART B Following completion of primary series, a booster dose should be given every 10 years to maintain immunity against tetanus  Td may also be given as tetanus wound prophylaxis  Tdap Vaccine - COST NOT COVERED BY MEDICARE PART B Recommended at least once for all adults  For pregnant patients, recommended with each pregnancy  Shingles Vaccine (Shingrix) - COST NOT COVERED BY MEDICARE PART B  2 shot series recommended in those aged 48 and above     Health Maintenance Due:      Topic Date Due    Colorectal Cancer Screening  Never done    Hepatitis C Screening  Completed     Immunizations Due:      Topic Date Due    DTaP,Tdap,and Td Vaccines (2 - Td or Tdap) 08/21/2021    Influenza Vaccine (1) 09/01/2021     Advance Directives   What are advance directives? Advance directives are legal documents that state your wishes and plans for medical care  These plans are made ahead of time in case you lose your ability to make decisions for yourself  Advance directives can apply to any medical decision, such as the treatments you want, and if you want to donate organs  What are the types of advance directives? There are many types of advance directives, and each state has rules about how to use them  You may choose a combination of any of the following:  · Living will: This is a written record of the treatment you want  You can also choose which treatments you do not want, which to limit, and which to stop at a certain time  This includes surgery, medicine, IV fluid, and tube feedings  · Durable power of  for healthcare [place]): This is a written record that states who you want to make healthcare choices for you when you are unable to make them for yourself  This person, called a proxy, is usually a family member or a friend  You may choose more than 1 proxy  · Do not resuscitate (DNR) order:  A DNR order is used in case your heart stops beating or you stop breathing  It is a request not to have certain forms of treatment, such as CPR  A DNR order may be included in other types of advance directives  · Medical directive: This covers the care that you want if you are in a coma, near death, or unable to make decisions for yourself  You can list the treatments you want for each condition  Treatment may include pain medicine, surgery, blood transfusions, dialysis, IV or tube feedings, and a ventilator (breathing machine)  · Values history: This document has questions about your views, beliefs, and how you feel and think about life  This information can help others choose the care that you would choose  Why are advance directives important? An advance directive helps you control your care  Although spoken wishes may be used, it is better to have your wishes written down  Spoken wishes can be misunderstood, or not followed  Treatments may be given even if you do not want them  An advance directive may make it easier for your family to make difficult choices about your care  Cigarette Smoking and Your Health   Risks to your health if you smoke:  Nicotine and other chemicals found in tobacco damage every cell in your body  Even if you are a light smoker, you have an increased risk for cancer, heart disease, and lung disease  If you are pregnant or have diabetes, smoking increases your risk for complications  Benefits to your health if you stop smoking:   · You decrease respiratory symptoms such as coughing, wheezing, and shortness of breath     · You reduce your risk for cancers of the lung, mouth, throat, kidney, bladder, pancreas, stomach, and cervix  If you already have cancer, you increase the benefits of chemotherapy  You also reduce your risk for cancer returning or a second cancer from developing  · You reduce your risk for heart disease, blood clots, heart attack, and stroke  · You reduce your risk for lung infections, and diseases such as pneumonia, asthma, chronic bronchitis, and emphysema  · Your circulation improves  More oxygen can be delivered to your body  If you have diabetes, you lower your risk for complications, such as kidney, artery, and eye diseases  You also lower your risk for nerve damage  Nerve damage can lead to amputations, poor vision, and blindness  · You improve your body's ability to heal and to fight infections  For more information and support to stop smoking:   · [employer]  Phone: 8- 066 - [phone]  Web Address: [employer]  Weight Management   Why it is important to manage your weight:  Being overweight increases your risk of health conditions such as heart disease, high blood pressure, type 2 diabetes, and certain types of cancer  It can also increase your risk for osteoarthritis, sleep apnea, and other respiratory problems  Aim for a slow, steady weight loss  Even a small amount of weight loss can lower your risk of health problems  How to lose weight safely:  A safe and healthy way to lose weight is to eat fewer calories and get regular exercise  You can lose up about 1 pound a week by decreasing the number of calories you eat by 500 calories each day  Healthy meal plan for weight management:  A healthy meal plan includes a variety of foods, contains fewer calories, and helps you stay healthy  A healthy meal plan includes the following:  · Eat whole-grain foods more often  A healthy meal plan should contain fiber  Fiber is the part of grains, fruits, and vegetables that is not broken down by your body   Whole-grain foods are healthy and provide extra fiber in your diet  Some examples of whole-grain foods are whole-wheat breads and pastas, oatmeal, brown rice, and bulgur  · Eat a variety of vegetables every day  Include dark, leafy greens such as spinach, kale, priscila greens, and mustard greens  Eat yellow and orange vegetables such as carrots, sweet potatoes, and winter squash  · Eat a variety of fruits every day  Choose fresh or canned fruit (canned in its own juice or light syrup) instead of juice  Fruit juice has very little or no fiber  · Eat low-fat dairy foods  Drink fat-free (skim) milk or 1% milk  Eat fat-free yogurt and low-fat cottage cheese  Try low-fat cheeses such as mozzarella and other reduced-fat cheeses  · Choose meat and other protein foods that are low in fat  Choose beans or other legumes such as split peas or lentils  Choose fish, skinless poultry (chicken or turkey), or lean cuts of red meat (beef or pork)  Before you cook meat or poultry, cut off any visible fat  · Use less fat and oil  Try baking foods instead of frying them  Add less fat, such as margarine, sour cream, regular salad dressing and mayonnaise to foods  Eat fewer high-fat foods  Some examples of high-fat foods include french fries, doughnuts, ice cream, and cakes  · Eat fewer sweets  Limit foods and drinks that are high in sugar  This includes candy, cookies, regular soda, and sweetened drinks  Exercise:  Exercise at least 30 minutes per day on most days of the week  Some examples of exercise include walking, biking, dancing, and swimming  You can also fit in more physical activity by taking the stairs instead of the elevator or parking farther away from stores  Ask your healthcare provider about the best exercise plan for you  © Copyright [employer] 2018 Information is for End User's use only and may not be sold, redistributed or otherwise used for commercial purposes   All illustrations and images included in CareNotes® are the copyrighted property of [employer]  or [address] [place], [place] [address]

## 2021-09-21 NOTE — PROGRESS NOTES
Assessment/Plan:     Diagnoses and all orders for this visit:    Type 2 diabetes mellitus without complication, without long-term current use of insulin (HCC)  -     Hemoglobin A1C  -     Microalbumin / creatinine urine ratio    Benign essential hypertension  -     lisinopril (ZESTRIL) 20 mg tablet; Take 1 tablet (20 mg total) by mouth daily  -     Comprehensive metabolic panel  -     CBC and differential    Mixed hyperlipidemia  -     atorvastatin (LIPITOR) 10 mg tablet; Take 1 tablet (10 mg total) by mouth daily  -     Lipid panel    Obstructive sleep apnea    Obesity, morbid (HCC)    Fatty liver    History of pulmonary embolus (PE)    Medicare annual wellness visit, subsequent    Screening for prostate cancer  -     PSA, Total Screen; Future         Continue with current medications  Office visit 6 months with repeat labs at that time  Flu vaccine in the fall  BMI Counseling: Body mass index is 34 06 kg/m²  The BMI is above normal  Nutrition recommendations include reducing portion sizes, decreasing overall calorie intake, consuming healthier snacks, moderation in carbohydrate intake, reducing intake of saturated fat and trans fat and reducing intake of cholesterol  Exercise recommendations include exercising 3-5 times per week  Patient ID: Sandy Camara is a 77 y o  male  Followup visit  Medications reviewed  Type 2 DM diet controlled  03/2021 FBS 99  A1c 6 3  Urine albumin/creatinine ratio normal at 22  3  on ACE  no DPN  Current with eye exam 08/2019 no retinopathy  Hypertension blood pressures have been stable on Lisinopril 10 mg daily  03/2021 creatinine 0 88  Electrolytes normal except for Na+ 134 and bicarb 20  Hgb 13 2  Hyperlipidemia mixed type on Atorvastatin 10 mg daily and 2 fish oils/day   Lipid profile 03/2021 cholesterol 119  Triglycerides  130 decreased from 176  HDL 31  LDL 88  LFTs normal   08/2019 TSH 1 500          The following portions of the patient's history were reviewed and updated as appropriate: allergies, current medications, past family history, past medical history, past social history, past surgical history and problem list     Review of Systems   Constitutional: Negative for appetite change, chills, fatigue, fever and unexpected weight change  HENT: Negative for congestion, ear pain, hearing loss, rhinorrhea, sore throat and trouble swallowing  Eyes: Negative for visual disturbance  Respiratory: Positive for apnea  Negative for cough, shortness of breath and wheezing  OSMAN he uses CPAP   S/p admission 06/2018 for bilateral pulmonary emboli  Venous Dopplers were not done in the hospital   PE unprovoked with no history of recent travel  no hospitalizations or surgeries  No family history of DVT/PE  No personal history of DVT or pulmonary embolus  No cancer history  Colonoscopy 04/2013  Workup in the hospital chest x-ray small area of infiltrate/atelectasis left lung base and small left effusion  Probable minimal right pleural effusion  CT scan abdomen and pelvis without contrast similar mild to moderate left hydronephrosis  left renal pelvic calculi seen  Small bilateral effusions and bilateral dependent subsegmental atelectasis  Normal liver, spleen and pancreas  No lymphadenopathy  CT scan chest showed bilateral pulmonary emboli left greater than right in the lower lobes  Pleural effusions left greater than right  Basal atelectasis  Possibly small left lower lobe pulmonary infarct  No evidence of right cardiac pressure overload  Mild pericardial effusion  Echocardiogram normal left ventricular chamber size normal left ventricular systolic function  No regional wall motion abnormalities  Mild concentric LV H  EF 60%  Moderate tricuspid regurgitation  Normal pulmonary artery systolic blood pressure  Mild diastolic dysfunction with normal filling pressures  Small pericardial effusion without evidence of hemodynamic compromise    EKG normal sinus rhythm no acute changes  Patient completed 6 months of Eliquis  07/2018 prothrombin mutation gene and Factor V Leidin negative  08/2019 D dimer < 0 27   Cardiovascular: Negative for chest pain, palpitations and leg swelling  Gastrointestinal: Negative for abdominal pain, blood in stool, constipation, diarrhea, nausea and vomiting  Colonoscopy 04/2013  Endocrine: Negative for polydipsia and polyuria  Genitourinary: Negative for difficulty urinating         03/2021 PSA 0 75   Musculoskeletal: Negative for arthralgias and myalgias  Skin: Negative for rash  Allergic/Immunologic: Negative for environmental allergies  Neurological: Negative for dizziness and headaches  Hematological: Negative for adenopathy  Does not bruise/bleed easily  Psychiatric/Behavioral: Negative for dysphoric mood and sleep disturbance  The patient is not nervous/anxious  Objective:    /74   Pulse 72   Temp (!) 97 1 °F (36 2 °C)   Resp 16   Ht 5' 9 8" (1 773 m)   Wt 107 kg (236 lb)   BMI 34 06 kg/m²     BP Readings from Last 3 Encounters:   09/21/21 126/74   03/17/21 132/76   09/02/20 118/64     Wt Readings from Last 3 Encounters:   09/21/21 107 kg (236 lb)   03/17/21 112 kg (248 lb)   09/02/20 108 kg (238 lb)          Physical Exam  Vitals and nursing note reviewed  Constitutional:       General: He is not in acute distress  Appearance: He is well-developed  HENT:      Right Ear: Tympanic membrane normal       Left Ear: Tympanic membrane normal    Eyes:      General: No scleral icterus  Extraocular Movements: Extraocular movements intact  Conjunctiva/sclera: Conjunctivae normal       Pupils: Pupils are equal, round, and reactive to light  Neck:      Thyroid: No thyroid mass or thyromegaly  Vascular: No carotid bruit or JVD  Trachea: No tracheal deviation  Cardiovascular:      Rate and Rhythm: Normal rate and regular rhythm        Pulses:           Carotid pulses are 2+ on the right side and 2+ on the left side  Heart sounds: Normal heart sounds  No murmur heard  No gallop  Pulmonary:      Effort: Pulmonary effort is normal  No respiratory distress  Breath sounds: Normal breath sounds  No wheezing or rales  Abdominal:      General: Bowel sounds are normal  There is no distension or abdominal bruit  Palpations: Abdomen is soft  There is no hepatomegaly, splenomegaly or mass  Tenderness: There is no abdominal tenderness  There is no guarding or rebound  Musculoskeletal:      Right lower leg: No edema  Left lower leg: No edema  Lymphadenopathy:      Cervical: No cervical adenopathy  Upper Body:      Right upper body: No supraclavicular adenopathy  Left upper body: No supraclavicular adenopathy  Skin:     Findings: No rash  Nails: There is no clubbing  Neurological:      General: No focal deficit present  Mental Status: He is alert and oriented to person, place, and time     Psychiatric:         Mood and Affect: Mood normal

## 2022-01-27 ENCOUNTER — OFFICE VISIT (OUTPATIENT)
Dept: DERMATOLOGY | Facility: CLINIC | Age: 67
End: 2022-01-27
Payer: COMMERCIAL

## 2022-01-27 VITALS — WEIGHT: 252 LBS | BODY MASS INDEX: 37.33 KG/M2 | HEIGHT: 69 IN | TEMPERATURE: 98.3 F

## 2022-01-27 DIAGNOSIS — L91.8 SKIN TAG: ICD-10-CM

## 2022-01-27 DIAGNOSIS — B07.9 VERRUCA VULGARIS: ICD-10-CM

## 2022-01-27 DIAGNOSIS — L72.0 EPIDERMAL CYST: ICD-10-CM

## 2022-01-27 DIAGNOSIS — L71.9 ROSACEA: ICD-10-CM

## 2022-01-27 DIAGNOSIS — D48.5 NEOPLASM OF UNCERTAIN BEHAVIOR OF SKIN: Primary | ICD-10-CM

## 2022-01-27 DIAGNOSIS — L82.1 SEBORRHEIC KERATOSES: ICD-10-CM

## 2022-01-27 PROCEDURE — 17110 DESTRUCTION B9 LES UP TO 14: CPT | Performed by: DERMATOLOGY

## 2022-01-27 PROCEDURE — 11102 TANGNTL BX SKIN SINGLE LES: CPT | Performed by: DERMATOLOGY

## 2022-01-27 PROCEDURE — 88305 TISSUE EXAM BY PATHOLOGIST: CPT | Performed by: STUDENT IN AN ORGANIZED HEALTH CARE EDUCATION/TRAINING PROGRAM

## 2022-01-27 PROCEDURE — 99204 OFFICE O/P NEW MOD 45 MIN: CPT | Performed by: DERMATOLOGY

## 2022-01-27 RX ORDER — MOMETASONE FUROATE 1 MG/ML
SOLUTION TOPICAL 2 TIMES DAILY
Qty: 60 ML | Refills: 0 | Status: SHIPPED | OUTPATIENT
Start: 2022-01-27 | End: 2022-03-24 | Stop reason: ALTCHOICE

## 2022-01-27 NOTE — PATIENT INSTRUCTIONS
SEBORRHEIC KERATOSIS; NON-INFLAMED    Assessment and Plan:  Based on a thorough discussion of this condition and the management approach to it (including a comprehensive discussion of the known risks, side effects and potential benefits of treatment), the patient (family) agrees to implement the following specific plan:   Reassured benign   Can be removed as a biopsy to determine clear diagnosis right lateral breast, right mid breast, left clavicle 2 cmm dark brown verrucous plaques   Will schedule for biopsy   $150 to treat up to 10 lesions, and if over 10 lesions, then additional $10/lesion thereafter  Seborrheic Keratosis  A seborrheic keratosis is a harmless warty spot that appears during adult life as a common sign of skin aging  Seborrheic keratoses can arise on any area of skin, covered or uncovered, with the usual exception of the palms and soles  They do not arise from mucous membranes  Seborrheic keratoses can have highly variable appearance  Seborrheic keratoses are extremely common  It has been estimated that over 90% of adults over the age of 61 years have one or more of them  They occur in males and females of all races, typically beginning to erupt in the 35s or 45s  They are uncommon under the age of 21 years  The precise cause of seborrhoeic keratoses is not known  Seborrhoeic keratoses are considered degenerative in nature  As time goes by, seborrheic keratoses tend to become more numerous  Some people inherit a tendency to develop a very large number of them; some people may have hundreds of them  The name "seborrheic keratosis" is misleading, because these lesions are not limited to a seborrhoeic distribution (scalp, mid-face, chest, upper back), nor are they formed from sebaceous glands, nor are they associated with sebum -- which is greasy    Seborrheic keratosis may also be called "SK," "Seb K," "basal cell papilloma," "senile wart," or "barnacle "      Researchers have noted:   Eruptive seborrhoeic keratoses can follow sunburn or dermatitis   Skin friction may be the reason they appear in body folds   Viral cause (e g , human papillomavirus) seems unlikely   Stable and clonal mutations or activation of FRFR3, PIK3CA, JEREMY, AKT1 and EGFR genes are found in seborrhoeic keratoses   Seborrhoeic keratosis can arise from solar lentigo   FRFR3 mutations also arise in solar lentigines  These mutations are associated with increased age and location on the head and neck, suggesting a role of ultraviolet radiation in these lesions   Seborrheic keratoses do not harbour tumour suppressor gene mutations   Epidermal growth factor receptor inhibitors, which are used to treat some cancers, often result in an increase in verrucal (warty) keratoses  There is no easy way to remove multiple lesions on a single occasion  Unless a specific lesion is "inflamed" and is causing pain or stinging/burning or is bleeding, most insurance companies do not authorize treatment  VERRUCA VULGARIS ("Common Warts")    Assessment and Plan:  Based on a thorough discussion of this condition and the management approach to it (including a comprehensive discussion of the known risks, side effects and potential benefits of treatment), the patient (family) agrees to implement the following specific plan:   Will treat with cryo therapy this visit   For the spots treated with liquid nitrogen today, expect them to stay red and become crusty over the course of the next 7-10 days, and then the crust should fall off  If you develop a small blister in the area, this is normal  Use Vaseline for irritation  Verruca Vulgaris  A verruca is a common growth of the skin caused by infection by human papilloma virus (HPV)  There are many strains of the virus that cause different types of warts on the body  The virus infects the most superficial layers of the skin, causing increased production of skin cells and thickening  Warts can be spread through direct contact with infected skin and may spread to other parts of the body if scratched or picked  A verruca is more commonly called a "wart " Warts are particularly common in school-aged children but can arise at any age  Patients who have a history of eczema are especially prone due to impaired skin barrier  Those taking immunosuppressive drugs or with HIV infections may experience prolonged symptoms despite treatment  Warts generally have a rough surface with a tiny black dot sometimes observed in the middle of each scaly spot  They can range in size from a small bump to large scaly growths  Common warts are often found on the backs of fingers or toes, around the nails, and on the knees  Plantar warts can grow inwardly on the soles of the feet causing pain  There are many possible ways to treat warts and sometimes several different treatments are needed to get the warts to go away completely  There is no single perfect treatment for warts, and successful treatment can take many months  In-office treatments usually require multiple visits, and include:  1) Cryotherapy  a cold spray with liquid nitrogen will destroy the infected cells but may lead to discomfort and blistering  It may also leave a permanent white gustabo or scar  2) Electrosurgery (curettage and cautery) can be used for large resistant warts which involves shaving the growth down and burning the base  It is performed under local anesthesia and may leave a permanent scar    3) Candida (yeast) antigen injections  These are extracts of the common yeast (Candida) that cannot cause an infection  The medication is injected into/under the wart  It is thought to stimulate the immune system to recognize the wart virus and attack it  Multiple injections are often needed about one month apart      There are also several at-home wart treatments:    1) Soak the warts in warm water for 5 minutes every night followed by gentle filing with a nail file or pumice stone  2) Topical salicylic acid or similar compounds work by removing the dead surface skin cells  a  Apply the medicine directly to the wart, wait for it to dry completely, then cover with duct tape overnight   b  Repeat until the wart is gone, which can take 2-4 months  c  Do not use on the face or groin area   d  If the wart paint makes the skin sore, stop treatment until the discomfort has settled, then recommence as above   e  Take care to keep the chemical off normal skin  3) Podophyllin is a cytotoxic agent used in some products and must not be used in pregnancy or women considering pregnancy  4) Some prescription medications include   a  Topical retinoids (adapalene, tretinoin, tazarotene), 5-fluorouracil (Efudex) or imiquimod (Aldara) creams are sometimes used to treat flat warts or warts on the face and other sensitive anatomical areas  They are usually applied directly to the warts once a day for 2-4 months and can be irritating  These treatments should only be used as directed by your health care provider  b  Systemic treatment with oral cimetidine (Tagamet) may help boost the immune system against the wart virus in patients, some of the time  Initiation of cimetidine therapy should ONLY be done under the supervision of your health care provider, who can discuss possible side effects and drug-to-drug interactions of this specific treatment  PROCEDURE:  DESTRUCTION OF BENIGN LESIONS  After a thorough discussion of treatment options and risk/benefits/alternatives (including but not limited to local pain, scarring, dyspigmentation, blistering, and possible superinfection), verbal and written consent were obtained and the aforementioned lesions were treated on with cryotherapy using liquid nitrogen x 1 cycle for 5-10 seconds  The patient tolerated the procedure well, and after-care instructions were provided        NEOPLASM OF UNCERTAIN BEHAVIOR OF SKIN    Assessment and Plan:   I have discussed with the patient that a sample of skin via a "skin biopsy would be potentially helpful to further make a specific diagnosis under the microscope   Based on a thorough discussion of this condition and the management approach to it (including a comprehensive discussion of the known risks, side effects and potential benefits of treatment), the patient (family) agrees to implement the following specific plan:    o Procedure:  Skin Biopsy  After a thorough discussion of treatment options and risk/benefits/alternatives (including but not limited to local pain, scarring, dyspigmentation, blistering, possible superinfection, and inability to confirm a diagnosis via histopathology), verbal and written consent were obtained and portion of the rash was biopsied for tissue sample  See below for consent that was obtained from patient and subsequent Procedure Note  PROCEDURE SHAVE BIOPSY NOTE:    After obtaining informed consent  at which time there was a discussion about the purpose of biopsy  and low risks of infection and bleeding  The area was prepped and draped in the usual fashion  Anesthesia was obtained with 1% lidocaine with epinephrine  A shave biopsy to an appropriate sampling depth was obtained with a sterile blade (such as a 15-blade or DermaBlade)  The resulting wound was covered with surgical ointment and bandaged appropriately  The patient tolerated the procedure well without complications and was without signs of functional compromise  Specimen has been sent for review by Dermatopathology  Standard post-procedure care has been explained and has been included in written form within the patient's copy of Informed Consent      INFORMED CONSENT DISCUSSION AND POST-OPERATIVE INSTRUCTIONS FOR PATIENT    I   RATIONALE FOR PROCEDURE  I understand that a skin biopsy allows the Dermatologist to test a lesion or rash under the microscope to obtain a diagnosis  It usually involves numbing the area with numbing medication and removing a small piece of skin; sometimes the area will be closed with sutures  In this specific procedure, sutures are not usually needed  If any sutures are placed, then they are usually need to be removed in 2 weeks or less  I understand that my Dermatologist recommends that a skin "shave" biopsy be performed today  A local anesthetic, similar to the kind that a dentist uses when filling a cavity, will be injected with a very small needle into the skin area to be sampled  The injected skin and tissue underneath "will go to sleep and become numb so no pain should be felt afterwards  An instrument shaped like a tiny "razor blade" (shave biopsy instrument) will be used to cut a small piece of tissue and skin from the area so that a sample of tissue can be taken and examined more closely under the microscope  A slight amount of bleeding will occur, but it will be stopped with direct pressure and a pressure bandage and any other appropriate methods  I understands that a scar will form where the wound was created  Surgical ointment will be applied to help protect the wound  Sutures are not usually needed  II   RISKS AND POTENTIAL COMPLICATIONS   I understand the risks and potential complications of a skin biopsy include but are not limited to the following:   Bleeding   Infection   Pain   Scar/keloid   Skin discoloration   Incomplete Removal   Recurrence   Nerve Damage/Numbness/Loss of Function   Allergic Reaction to Anesthesia   Biopsies are diagnostic procedures and based on findings additional treatment or evaluation may be required   Loss or destruction of specimen resulting in no additional findings    My Dermatologist has explained to me the nature of the condition, the nature of the procedure, and the benefits to be reasonably expected compared with alternative approaches    My Dermatologist has discussed the likelihood of major risks or complications of this procedure including the specific risks listed above, such as bleeding, infection, and scarring/keloid  I understand that a scar is expected after this procedure  I understand that my physician cannot predict if the scar will form a "keloid," which extends beyond the borders of the wound that is created  A keloid is a thick, painful, and bumpy scar  A keloid can be difficult to treat, as it does not always respond well to therapy, which includes injecting cortisone directly into the keloid every few weeks  While this usually reduces the pain and size of the scar, it does not eliminate it  I understand that photographs may be taken before and after the procedure  These will be maintained as part of the medical providers confidential records and may not be made available to me  I further authorize the medical provider to use the photographs for teaching purposes or to illustrate scientific papers, books, or lectures if in his/her judgment, medical research, education, or science may benefit from its use  I have had an opportunity to fully inquire about the risks and benefits of this procedure and its alternatives  I have been given ample time and opportunity to ask questions and to seek a second opinion if I wished to do so  I acknowledge that there have specifically been no guarantees as to the cosmetic results from the procedure  I am aware that with any procedure there is always the possibility of an unexpected complication  III  POST-PROCEDURAL CARE (WHAT YOU WILL NEED TO DO "AFTER THE BIOPSY" TO OPTIMIZE HEALING)     Keep the area clean and dry  Try NOT to remove the bandage or get it wet for the first 24 hours   Gently clean the area and apply surgical ointment (such as Vaseline petrolatum ointment, which is available "over the counter" and not a prescription) to the biopsy site for up to 2 weeks straight    This acts to protect the wound from the outside world   Sutures are not usually placed in this procedure  If any sutures were placed, return for suture removal as instructed (generally 1 week for the face, 2 weeks for the body)   Take Acetaminophen (Tylenol) for discomfort, if no contraindications  Ibuprofen or aspirin could make bleeding worse   Call our office immediately for signs of infection: fever, chills, increased redness, warmth, tenderness, discomfort/pain, or pus or foul smell coming from the wound  WHAT TO DO IF THERE IS ANY BLEEDING? If a small amount of bleeding is noticed, place a clean cloth over the area and apply firm pressure for ten minutes  Check the wound after 10 minutes of direct pressure  If bleeding persists, try one more time for an additional 10 minutes of direct pressure on the area  If the bleeding becomes heavier or does not stop after the second attempt, or if you have any other questions about this procedure, then please call your [facility] by calling [phone] (SKIN)  I hereby acknowledge that I have reviewed and verified the site with my Dermatologist and have requested and authorized my Dermatologist to proceed with the procedure  ROSACEA    Assessment and Plan:  Based on a thorough discussion of this condition and the management approach to it (including a comprehensive discussion of the known risks, side effects and potential benefits of treatment), the patient (family) agrees to implement the following specific plan:   Discuss treatment option which would be Accutane   Patient differ treatment at this visit  Rosacea is a chronic rash affecting the mid-face including the nose, cheeks, chin, forehead, and eyelids  The incidence is usually greatest between the ages of 30-60 years and is more common in people with fair skin  Common characteristics include redness, telangiectasias, papules and pustules over affected areas   Rosacea may look similar to acne, but there is a lack of comedones  Occasionally the eyes may also be involved in ocular rosacea  In advanced disease, enlargement of the sebaceous glands in the nose, termed rhinophyma, may be present  Rosacea results in red spots (papules) and sometimes pustules over the face, but unlike acne there are no blackheads, whiteheads, or cystic nodules  Patients often experience increased facial flushing with prominent blood vessels (erythematotelangiectatic rosacea) and dry, sensitive skin  These symptoms are exacerbated by sun exposure, hot or spicy foods, topical steroids and oil-based facial products  In ocular rosacea, eyelids may be red and sore due to conjunctivitis, keratitis, and episcleritis  If rhinophyma develops due to enlargement of sebaceous glands, the patient may have an enlarged and irregularly shaped nose with prominent pores  In rosacea that is refractory to treatment, patients can develop persistent redness and swelling of the face due to lymphatic obstruction (Morbihan disease)  Distribution around the cheeks may be confused with the malar or butterfly rash of lupus  However, the rash of lupus spares the nasal creases and lacks papules and pustules  If signs of photosensitivity, oral ulcers, arthritis, and kidney dysfunction are present then consider referral to a rheumatologist      There are many potential causes of rosacea including genetic, environmental, vascular, and inflammatory factors   These include, but are not limited to:   Chronic exposure to ultraviolet radiation    Increased immune responses in the form of cathelicidins that promote vessel dilation and infiltration with white blood cells (neutrophils) into the dermis   Increased matrix metalloproteinases such as collagen and elastase that remodel normal tissue may contribute to inflammation of the skin making it thicker and harder   There is some evidence to suggest that increased numbers of demodex mites on patient skin may contribute to rosacea papules     General Treatment Approach    Avoid exacerbating factors such as heat, spicy foods, and alcohol    Use daily SPF30+ sunscreen and other methods of coverage for sun protection   Use water-based make-up    Avoid applying topical steroids to affected areas as they can cause perioral dermatitis and exacerbate rosacea     Topical Treatment Approach   Metronidazole cream or gel by itself or in combination with oral antibiotics for more severe cases   Azelaic acid cream or lotion is effective for mild inflammatory rosacea when applied twice daily to affected areas   Brimonidine gel and oxymetazoline hydrochloride cream can reduce facial redness temporarily    Ivermectin cream can treat papulopustular rosacea by controlling demodex mites and inflammation    Pimecrolimus cream or tacrolimus ointment twice a day for 2-3 months can help reduce inflammation    Oral Treatment Approach   Antibiotics such as doxycycline, minocycline, or erythromycin for 1-3 months   Clonidine and carvedilol can help reduce facial flushing and are generally well tolerated  Common side effects include low blood pressure, gastrointestinal upset, dry eyes, blurred vision and low heart rate   Isotretinoin at low doses can be effective for long term treatment when antibiotics fail  Side effects may make it unsuitable for some patients   NSAIDs such as diclofenac can help reduce discomfort and redness in the skin       Procedural/Surgical Treatment Approach    Vascular lasers or intense pulsed light treatment may be used to treat persistent telangiectasia and papulopustular rosacea   Plastic surgery and carbon dioxide lasers may be used to treat rhinophyma     ACROCHORDON ("SKIN TAG")    Assessment and Plan:  Based on a thorough discussion of this condition and the management approach to it (including a comprehensive discussion of the known risks, side effects and potential benefits of treatment), the patient (family) agrees to implement the following specific plan:   Reassured benign  Skin tags are common, soft, harmless skin lesions that are also called, in the appropriate settings, papillomas, fibroepithelial polyps, and soft fibromas  They are made up of loosely arranged collagen fibers and blood vessels surrounded by a thickened or thinned-out epidermis  Skin tags tend to develop in both men and women as we grow older  They are usually found on the skin folds (neck, armpits, groin)  It is not known what specifically causes skin tags  Certain factors, though, do appear to play a role:   Chaffing and irritation from skin rubbing together   High levels of growth factors (as seen, for example, in pregnancy or in acromegaly/gigantism)   Insulin resistance   Human papillomavirus (wart virus)    We discussed that most skin tags do not need to be treated unless they are specifically causing the patient physical distress or limitation or pose a risk for a larger problem such as an infection that forms secondary to excoriation or chronic irritation      We had a thorough discussion of treatment options and specific risks (including that any procedural treatment may not be covered by insurance and would then be the patient's responsibility) and benefits/alternatives including but not limited to the following:   Cryotherapy (freezing)   Shave removal   Surgical excision (snip excision with scissors)   Electrosurgery   Ligation (we do not do this procedure and counseled against it due to risk of tissue necrosis and infection)    EPIDERMAL INCLUSION CYST    Assessment and Plan:  Based on a thorough discussion of this condition and the management approach to it (including a comprehensive discussion of the known risks, side effects and potential benefits of treatment), the patient (family) agrees to implement the following specific plan:   Mometasone 0 1% lotion apply to the left occiput 2 times daily for 2 weeks  What are epidermal inclusion cysts? Epidermal inclusion cysts are the most common, benign cutaneous cysts  There are many different names for epidermal inclusion cysts, including epidermoid cyst, epidermal cyst, infundibular cyst, inclusion cyst, and keratin cyst  These cysts can occur anywhere on the body and typically present as nodules directly underneath the skin  There is often a visible pore or opening in the center  The cysts are freely moveable and can range from a few millimeters to several centimeters in diameter  The center of epidermoid cysts almost always contains keratin, which has a cheesy appearance, and not sebum  They also do not originate from sebaceous glands  Therefore, epidermal inclusion cysts are not the same as sebaceous cysts  Cysts may remain stable or progressively enlarge over time  There are no reliable predictive factors to tell if an epidermal inclusion cyst will enlarge, become inflamed, or remain quiescent  Infected cysts tend to become larger, turn red, and are more noticeable to the patient  There may be accompanying pain and discomfort  What causes epidermal inclusion cysts? Epidermal inclusion cysts often appear out of the blue and are not contagious  They are due to a proliferation of epidermal cells within the dermis and are more common in men than women  They occur more frequently in patients in their 20s to 45s  Epidermal inclusion cysts by themselves are usually not inherited, but they can be hereditary in rare syndromes such as Mccollum syndrome, nodular elastosis with cysts and comedones (Favre-Racouchot syndrome), and basal cell nevus syndrome (Gorlin syndrome)  Elderly patients with chronic sun-damaged skin areas have a higher likelihood of developing epidermoid cysts  They often occur in areas where hair follicles have been inflamed or repeatedly irritated are more frequent in patients with acne vulgaris   In the  period, they are called milia  Patients on BRAF inhibitors such as imiquimod and cyclosporine have a higher incidence of epidermoid cysts of the face  How do we diagnose an epidermal inclusion cyst?  Epidermoid inclusion cysts are often diagnosed by history and physical exam  There is usually no need for biopsy prior to removal   Radiographic and laboratory exams, such as ultrasound studies, are unnecessary and not typically ordered unless the practitioner suspects a genetic condition  What is the treatment for an epidermal inclusion cyst?  Inflamed, uninfected epidermal inclusion cysts rarely resolve spontaneously without therapy or surgical intervention  Treatment is not emergent unless desired by the patient  Definitive treatment is via surgical excision with walls intact  This method will prevent recurrence  This is best done when the cyst is not inflamed, to decrease the probability of rupture during surgery  - A local anesthetic will be injected around the cyst  - A small incision is made in the skin overlying the cyst, and contents are expressed  - The incision is repaired with sutures    Another option is to use a 4mm punch biopsy with cyst extraction through the defect  Incision and drainage is often needed if the cyst is infected or inflamed  If there is surrounding cellulitis, oral antibiotic therapy may be necessary  The common agents used target methicillin sensitive Staphylococcal aureus and methicillin resistant S aureus in areas of high prevalence

## 2022-01-27 NOTE — PROGRESS NOTES
[facility] Note     Patient Name: Lex Gresham  Encounter Date: 1/27/22     Have you been cared for by a [facility] Dermatologist in the last 3 years and, if so, which one? No    · Have you traveled outside of the [address] [place] [address] in the past 3 months or outside of the [facility] area in the last 2 weeks? No     May we call your Preferred Phone number to discuss your specific medical information? Yes     May we leave a detailed message that includes your specific medical information? Yes      Today's Chief Concerns:   Concern #1:  Growths on face trunk and neck   Concern #2:      Past Medical History:  Have you personally ever had or currently have any of the following? · Skin cancer (such as Melanoma, Basal Cell Carcinoma, Squamous Cell Carcinoma? (If Yes, please provide more detail)- No  · Eczema: No  · Psoriasis: No  · HIV/AIDS: No  · Hepatitis B or C: No  · Tuberculosis: No  · Systemic Immunosuppression such as Diabetes, Biologic or Immunotherapy, Chemotherapy, Organ Transplantation, Bone Marrow Transplantation (If YES, please provide more detail): No  · Radiation Treatment (If YES, please provide more detail): No  · Any other major medical conditions/concerns? (If Yes, which types)- No    Social History:     What is/was your primary occupation? retired     What are your hobbies/past-times? n/a    Family History:  Have any of your "first degree relatives" (parent, brother, sister, or child) had any of the following       · Skin cancer such as Melanoma or Merkel Cell Carcinoma or Pancreatic Cancer? No  · Eczema, Asthma, Hay Fever or Seasonal Allergies: No  · Psoriasis or Psoriatic Arthritis: No  · Do any other medical conditions seem to run in your family? If Yes, what condition and which relatives?   No    Current Medications:   (please update all dermatological medications before printing patient's AVS!)      Current Outpatient Medications:     aspirin (ECOTRIN LOW STRENGTH) 81 mg EC tablet, Take 81 mg by mouth daily, Disp: , Rfl:     atorvastatin (LIPITOR) 10 mg tablet, Take 1 tablet (10 mg total) by mouth daily, Disp: 90 tablet, Rfl: 3    lisinopril (ZESTRIL) 20 mg tablet, Take 1 tablet (20 mg total) by mouth daily, Disp: 90 tablet, Rfl: 3    multivitamin (THERAGRAN) TABS, Take 1 tablet by mouth, Disp: , Rfl:     Omega-3 Fatty Acids (FISH OIL PO), Take 1 g by mouth daily, Disp: , Rfl:       Review of Systems:  Have you recently had or currently have any of the following? If YES, what are you doing for the problem? · Fever, chills or unintended weight loss: No  · Sudden loss or change in your vision: No  · Nausea, vomiting or blood in your stool: No  · Painful or swollen joints: No  · Wheezing or cough: No  · Changing mole or non-healing wound: No  · Nosebleeds: No  · Excessive sweating: No  · Easy or prolonged bleeding? No  · Over the last 2 weeks, how often have you been bothered by the following problems? · Taking little interest or pleasure in doing things: 1 - Not at All  · Feeling down, depressed, or hopeless: 1 - Not at All  · Rapid heartbeat with epinephrine:  No        · Any known allergies? · No Known Allergies      Physical Exam:     Was a chaperone (Derm Clinical Assistant) present throughout the entire Physical Exam? Yes     Did the Dermatology Team specifically  the patient on the importance of a Full Skin Exam to be sure that nothing is missed clinically?  Yes}  o Did the patient ultimately request or accept a Full Skin Exam?  Yes, partial  o Did the patient specifically refuse to have the areas "under-the-bra" examined by the Dermatologist? No  o Did the patient specifically refuse to have the areas "under-the-underwear" examined by the Dermatologist? No    CONSTITUTIONAL:   Vitals:    01/27/22 0843   Temp: 98 3 °F (36 8 °C)   TempSrc: Temporal   Weight: 114 kg (252 lb)   Height: 5' 8 5" (1 74 m)           PSYCH: Normal mood and affect  EYES: Normal conjunctiva  ENT: Normal lips and oral mucosa  CARDIOVASCULAR: No edema  RESPIRATORY: Normal respirations  HEME/LYMPH/IMMUNO:  No regional lymphadenopathy except as noted below in "ASSESSMENT AND PLAN BY DIAGNOSIS"    SKIN:  FULL ORGAN SYSTEM EXAM    Ears, Face Normal except as noted below in Assessment   Neck, Cervical Chain Nodes Normal except as noted below in Assessment   Chest/Breasts/Axillae Viewed areas Normal except as noted below in Assessment   Abdomen Normal except as noted below in Assessment   Back Normal except as noted below in Assessment        Assessment and Plan by Diagnosis:    History of Present Condition:     Duration:  How long has this been an issue for you? o  present for a long time as gotten bigger   Location Affected:  Where on the body is this affecting you?    o  face, trunk, back   Quality:  Is there any bleeding, pain, itch, burning/irritation, or redness associated with the skin lesion? o  denies   Severity:  Describe any bleeding, pain, itch, burning/irritation, or redness on a scale of 1 to 10 (with 10 being the worst)  o  denies   Timing:  Does this condition seem to be there pretty constantly or do you notice it more at specific times throughout the day? o  consistent   Context:  Have you ever noticed that this condition seems to be associated with specific activities you do?    o  denies   Modifying Factors:    o Anything that seems to make the condition worse?    -  denies  o What have you tried to do to make the condition better?    -  denies   Associated Signs and Symptoms:  Does this skin lesion seem to be associated with any of the following:  o     SEBORRHEIC KERATOSIS; NON-INFLAMED    Physical Exam:   Anatomic Location Affected:  Neck, chest, back,    Morphological Description:  Flat and raised, waxy, smooth to warty textured, yellow to brownish-grey to dark brown to blackish, discrete, "stuck-on" appearing papules     Pertinent Positives:   Pertinent Negatives: Additional History of Present Condition:  Patient reports new bumps on the skin  Denies itch, burn, pain, bleeding or ulceration  Present constantly; nothing seems to make it worse or better  No prior treatment  Assessment and Plan:  Based on a thorough discussion of this condition and the management approach to it (including a comprehensive discussion of the known risks, side effects and potential benefits of treatment), the patient (family) agrees to implement the following specific plan:   Reassured benign   Can be removed as a biopsy to determine clear diagnosis right lateral breast, right mid breast, left clavicle 2 cmm dark brown verrucous plaques   Will schedule for biopsy   $150 to treat up to 10 lesions, and if over 10 lesions, then additional $10/lesion thereafter  Seborrheic Keratosis  A seborrheic keratosis is a harmless warty spot that appears during adult life as a common sign of skin aging  Seborrheic keratoses can arise on any area of skin, covered or uncovered, with the usual exception of the palms and soles  They do not arise from mucous membranes  Seborrheic keratoses can have highly variable appearance  Seborrheic keratoses are extremely common  It has been estimated that over 90% of adults over the age of 61 years have one or more of them  They occur in males and females of all races, typically beginning to erupt in the 35s or 45s  They are uncommon under the age of 21 years  The precise cause of seborrhoeic keratoses is not known  Seborrhoeic keratoses are considered degenerative in nature  As time goes by, seborrheic keratoses tend to become more numerous  Some people inherit a tendency to develop a very large number of them; some people may have hundreds of them      The name "seborrheic keratosis" is misleading, because these lesions are not limited to a seborrhoeic distribution (scalp, mid-face, chest, upper back), nor are they formed from sebaceous glands, nor are they associated with sebum -- which is greasy  Seborrheic keratosis may also be called "SK," "Seb K," "basal cell papilloma," "senile wart," or "barnacle "      Researchers have noted:   Eruptive seborrhoeic keratoses can follow sunburn or dermatitis   Skin friction may be the reason they appear in body folds   Viral cause (e g , human papillomavirus) seems unlikely   Stable and clonal mutations or activation of FRFR3, PIK3CA, JEREMY, AKT1 and EGFR genes are found in seborrhoeic keratoses   Seborrhoeic keratosis can arise from solar lentigo   FRFR3 mutations also arise in solar lentigines  These mutations are associated with increased age and location on the head and neck, suggesting a role of ultraviolet radiation in these lesions   Seborrheic keratoses do not harbour tumour suppressor gene mutations   Epidermal growth factor receptor inhibitors, which are used to treat some cancers, often result in an increase in verrucal (warty) keratoses  There is no easy way to remove multiple lesions on a single occasion  Unless a specific lesion is "inflamed" and is causing pain or stinging/burning or is bleeding, most insurance companies do not authorize treatment  VERRUCA VULGARIS ("Common Warts")    Physical Exam:   Anatomic Location Affected:  Left frontal scalp   Morphological Description:  Verruca papule   Pertinent Positives:   Pertinent Negatives: Additional History of Present Condition:      Assessment and Plan:  Based on a thorough discussion of this condition and the management approach to it (including a comprehensive discussion of the known risks, side effects and potential benefits of treatment), the patient (family) agrees to implement the following specific plan:   Will treat with cryo therapy this visit     For the spots treated with liquid nitrogen today, expect them to stay red and become crusty over the course of the next 7-10 days, and then the crust should fall off  If you develop a small blister in the area, this is normal  Use Vaseline for irritation  Verruca Vulgaris  A verruca is a common growth of the skin caused by infection by human papilloma virus (HPV)  There are many strains of the virus that cause different types of warts on the body  The virus infects the most superficial layers of the skin, causing increased production of skin cells and thickening  Warts can be spread through direct contact with infected skin and may spread to other parts of the body if scratched or picked  A verruca is more commonly called a "wart " Warts are particularly common in school-aged children but can arise at any age  Patients who have a history of eczema are especially prone due to impaired skin barrier  Those taking immunosuppressive drugs or with HIV infections may experience prolonged symptoms despite treatment  Warts generally have a rough surface with a tiny black dot sometimes observed in the middle of each scaly spot  They can range in size from a small bump to large scaly growths  Common warts are often found on the backs of fingers or toes, around the nails, and on the knees  Plantar warts can grow inwardly on the soles of the feet causing pain  There are many possible ways to treat warts and sometimes several different treatments are needed to get the warts to go away completely  There is no single perfect treatment for warts, and successful treatment can take many months  In-office treatments usually require multiple visits, and include:  1) Cryotherapy  a cold spray with liquid nitrogen will destroy the infected cells but may lead to discomfort and blistering  It may also leave a permanent white gustabo or scar  2) Electrosurgery (curettage and cautery) can be used for large resistant warts which involves shaving the growth down and burning the base   It is performed under local anesthesia and may leave a permanent scar    3) Candida (yeast) antigen injections  These are extracts of the common yeast (Candida) that cannot cause an infection  The medication is injected into/under the wart  It is thought to stimulate the immune system to recognize the wart virus and attack it  Multiple injections are often needed about one month apart  There are also several at-home wart treatments:    1) Soak the warts in warm water for 5 minutes every night followed by gentle filing with a nail file or pumice stone  2) Topical salicylic acid or similar compounds work by removing the dead surface skin cells  a  Apply the medicine directly to the wart, wait for it to dry completely, then cover with duct tape overnight   b  Repeat until the wart is gone, which can take 2-4 months  c  Do not use on the face or groin area   d  If the wart paint makes the skin sore, stop treatment until the discomfort has settled, then recommence as above   e  Take care to keep the chemical off normal skin  3) Podophyllin is a cytotoxic agent used in some products and must not be used in pregnancy or women considering pregnancy  4) Some prescription medications include   a  Topical retinoids (adapalene, tretinoin, tazarotene), 5-fluorouracil (Efudex) or imiquimod (Aldara) creams are sometimes used to treat flat warts or warts on the face and other sensitive anatomical areas  They are usually applied directly to the warts once a day for 2-4 months and can be irritating  These treatments should only be used as directed by your health care provider  b  Systemic treatment with oral cimetidine (Tagamet) may help boost the immune system against the wart virus in patients, some of the time  Initiation of cimetidine therapy should ONLY be done under the supervision of your health care provider, who can discuss possible side effects and drug-to-drug interactions of this specific treatment       PROCEDURE:  DESTRUCTION OF BENIGN LESIONS  After a thorough discussion of treatment options and risk/benefits/alternatives (including but not limited to local pain, scarring, dyspigmentation, blistering, and possible superinfection), verbal and written consent were obtained and the aforementioned lesions were treated on with cryotherapy using liquid nitrogen x 1 cycle for 5-10 seconds   TOTAL NUMBER of 1 lesions were treated today on the ANATOMIC LOCATION: left frontal scalp  The patient tolerated the procedure well, and after-care instructions were provided  NEOPLASM OF UNCERTAIN BEHAVIOR OF SKIN    Physical Exam:   (Anatomic Location); (Size and Morphological Description); (Differential Diagnosis):  o A: left Temple; 6 mm hyperkeratotic red papule; (verruca rule out Squamous cell carcinoma   Pertinent Positives:   Pertinent Negatives: Additional History of Present Condition:      Assessment and Plan:   I have discussed with the patient that a sample of skin via a "skin biopsy would be potentially helpful to further make a specific diagnosis under the microscope   Based on a thorough discussion of this condition and the management approach to it (including a comprehensive discussion of the known risks, side effects and potential benefits of treatment), the patient (family) agrees to implement the following specific plan:    o Procedure:  Skin Biopsy  After a thorough discussion of treatment options and risk/benefits/alternatives (including but not limited to local pain, scarring, dyspigmentation, blistering, possible superinfection, and inability to confirm a diagnosis via histopathology), verbal and written consent were obtained and portion of the rash was biopsied for tissue sample  See below for consent that was obtained from patient and subsequent Procedure Note    PROCEDURE SHAVE BIOPSY NOTE:     Performing Physician: Jeramie Davila Anatomic Location; Clinical Description with size (cm); Pre-Op Diagnosis:   o A: left Epes; 6 mm hyperkeratotic red papule; (verruca rule out Squamous cell carcinoma   Post-op diagnosis: Same      Local anesthesia: 1% xylocaine with epi       Topical anesthesia: None     Hemostasis: Aluminum chloride       After obtaining informed consent  at which time there was a discussion about the purpose of biopsy  and low risks of infection and bleeding  The area was prepped and draped in the usual fashion  Anesthesia was obtained with 1% lidocaine with epinephrine  A shave biopsy to an appropriate sampling depth was obtained with a sterile blade (such as a 15-blade or DermaBlade)  The resulting wound was covered with surgical ointment and bandaged appropriately  The patient tolerated the procedure well without complications and was without signs of functional compromise  Specimen has been sent for review by Dermatopathology  Standard post-procedure care has been explained and has been included in written form within the patient's copy of Informed Consent  INFORMED CONSENT DISCUSSION AND POST-OPERATIVE INSTRUCTIONS FOR PATIENT    I   RATIONALE FOR PROCEDURE  I understand that a skin biopsy allows the Dermatologist to test a lesion or rash under the microscope to obtain a diagnosis  It usually involves numbing the area with numbing medication and removing a small piece of skin; sometimes the area will be closed with sutures  In this specific procedure, sutures are not usually needed  If any sutures are placed, then they are usually need to be removed in 2 weeks or less  I understand that my Dermatologist recommends that a skin "shave" biopsy be performed today  A local anesthetic, similar to the kind that a dentist uses when filling a cavity, will be injected with a very small needle into the skin area to be sampled  The injected skin and tissue underneath "will go to sleep and become numb so no pain should be felt afterwards    An instrument shaped like a tiny "razor blade" (shave biopsy instrument) will be used to cut a small piece of tissue and skin from the area so that a sample of tissue can be taken and examined more closely under the microscope  A slight amount of bleeding will occur, but it will be stopped with direct pressure and a pressure bandage and any other appropriate methods  I understands that a scar will form where the wound was created  Surgical ointment will be applied to help protect the wound  Sutures are not usually needed  II   RISKS AND POTENTIAL COMPLICATIONS   I understand the risks and potential complications of a skin biopsy include but are not limited to the following:   Bleeding   Infection   Pain   Scar/keloid   Skin discoloration   Incomplete Removal   Recurrence   Nerve Damage/Numbness/Loss of Function   Allergic Reaction to Anesthesia   Biopsies are diagnostic procedures and based on findings additional treatment or evaluation may be required   Loss or destruction of specimen resulting in no additional findings    My Dermatologist has explained to me the nature of the condition, the nature of the procedure, and the benefits to be reasonably expected compared with alternative approaches  My Dermatologist has discussed the likelihood of major risks or complications of this procedure including the specific risks listed above, such as bleeding, infection, and scarring/keloid  I understand that a scar is expected after this procedure  I understand that my physician cannot predict if the scar will form a "keloid," which extends beyond the borders of the wound that is created  A keloid is a thick, painful, and bumpy scar  A keloid can be difficult to treat, as it does not always respond well to therapy, which includes injecting cortisone directly into the keloid every few weeks  While this usually reduces the pain and size of the scar, it does not eliminate it  I understand that photographs may be taken before and after the procedure    These will be maintained as part of the medical providers confidential records and may not be made available to me  I further authorize the medical provider to use the photographs for teaching purposes or to illustrate scientific papers, books, or lectures if in his/her judgment, medical research, education, or science may benefit from its use  I have had an opportunity to fully inquire about the risks and benefits of this procedure and its alternatives  I have been given ample time and opportunity to ask questions and to seek a second opinion if I wished to do so  I acknowledge that there have specifically been no guarantees as to the cosmetic results from the procedure  I am aware that with any procedure there is always the possibility of an unexpected complication  III  POST-PROCEDURAL CARE (WHAT YOU WILL NEED TO DO "AFTER THE BIOPSY" TO OPTIMIZE HEALING)     Keep the area clean and dry  Try NOT to remove the bandage or get it wet for the first 24 hours   Gently clean the area and apply surgical ointment (such as Vaseline petrolatum ointment, which is available "over the counter" and not a prescription) to the biopsy site for up to 2 weeks straight  This acts to protect the wound from the outside world   Sutures are not usually placed in this procedure  If any sutures were placed, return for suture removal as instructed (generally 1 week for the face, 2 weeks for the body)   Take Acetaminophen (Tylenol) for discomfort, if no contraindications  Ibuprofen or aspirin could make bleeding worse   Call our office immediately for signs of infection: fever, chills, increased redness, warmth, tenderness, discomfort/pain, or pus or foul smell coming from the wound  WHAT TO DO IF THERE IS ANY BLEEDING? If a small amount of bleeding is noticed, place a clean cloth over the area and apply firm pressure for ten minutes  Check the wound after 10 minutes of direct pressure    If bleeding persists, try one more time for an additional 10 minutes of direct pressure on the area  If the bleeding becomes heavier or does not stop after the second attempt, or if you have any other questions about this procedure, then please call your [facility] [address] Dermatologist by calling [phone] (SKIN)  I hereby acknowledge that I have reviewed and verified the site with my Dermatologist and have requested and authorized my Dermatologist to proceed with the procedure  ROSACEA    Physical Exam:   Anatomic Location Affected:  face   Morphological Description:  Numerous cyst, pink and yellow plaques   Pertinent Positives:   Pertinent Negatives: Additional History of Present Condition:  Occurs 2 times a year usually prescribed Doxycyline  Assessment and Plan:  Based on a thorough discussion of this condition and the management approach to it (including a comprehensive discussion of the known risks, side effects and potential benefits of treatment), the patient (family) agrees to implement the following specific plan:   Discuss treatment option which would be Accutane   Patient differ treatment at this visit  Rosacea is a chronic rash affecting the mid-face including the nose, cheeks, chin, forehead, and eyelids  The incidence is usually greatest between the ages of 30-60 years and is more common in people with fair skin  Common characteristics include redness, telangiectasias, papules and pustules over affected areas  Rosacea may look similar to acne, but there is a lack of comedones  Occasionally the eyes may also be involved in ocular rosacea  In advanced disease, enlargement of the sebaceous glands in the nose, termed rhinophyma, may be present  Rosacea results in red spots (papules) and sometimes pustules over the face, but unlike acne there are no blackheads, whiteheads, or cystic nodules   Patients often experience increased facial flushing with prominent blood vessels (erythematotelangiectatic rosacea) and dry, sensitive skin  These symptoms are exacerbated by sun exposure, hot or spicy foods, topical steroids and oil-based facial products  In ocular rosacea, eyelids may be red and sore due to conjunctivitis, keratitis, and episcleritis  If rhinophyma develops due to enlargement of sebaceous glands, the patient may have an enlarged and irregularly shaped nose with prominent pores  In rosacea that is refractory to treatment, patients can develop persistent redness and swelling of the face due to lymphatic obstruction (Morbihan disease)  Distribution around the cheeks may be confused with the malar or butterfly rash of lupus  However, the rash of lupus spares the nasal creases and lacks papules and pustules  If signs of photosensitivity, oral ulcers, arthritis, and kidney dysfunction are present then consider referral to a rheumatologist      There are many potential causes of rosacea including genetic, environmental, vascular, and inflammatory factors   These include, but are not limited to:   Chronic exposure to ultraviolet radiation    Increased immune responses in the form of cathelicidins that promote vessel dilation and infiltration with white blood cells (neutrophils) into the dermis   Increased matrix metalloproteinases such as collagen and elastase that remodel normal tissue may contribute to inflammation of the skin making it thicker and harder   There is some evidence to suggest that increased numbers of demodex mites on patient skin may contribute to rosacea papules     General Treatment Approach    Avoid exacerbating factors such as heat, spicy foods, and alcohol    Use daily SPF30+ sunscreen and other methods of coverage for sun protection   Use water-based make-up    Avoid applying topical steroids to affected areas as they can cause perioral dermatitis and exacerbate rosacea     Topical Treatment Approach   Metronidazole cream or gel by itself or in combination with oral antibiotics for more severe cases   Azelaic acid cream or lotion is effective for mild inflammatory rosacea when applied twice daily to affected areas   Brimonidine gel and oxymetazoline hydrochloride cream can reduce facial redness temporarily    Ivermectin cream can treat papulopustular rosacea by controlling demodex mites and inflammation    Pimecrolimus cream or tacrolimus ointment twice a day for 2-3 months can help reduce inflammation    Oral Treatment Approach   Antibiotics such as doxycycline, minocycline, or erythromycin for 1-3 months   Clonidine and carvedilol can help reduce facial flushing and are generally well tolerated  Common side effects include low blood pressure, gastrointestinal upset, dry eyes, blurred vision and low heart rate   Isotretinoin at low doses can be effective for long term treatment when antibiotics fail  Side effects may make it unsuitable for some patients   NSAIDs such as diclofenac can help reduce discomfort and redness in the skin  Procedural/Surgical Treatment Approach    Vascular lasers or intense pulsed light treatment may be used to treat persistent telangiectasia and papulopustular rosacea   Plastic surgery and carbon dioxide lasers may be used to treat rhinophyma     ACROCHORDON ("SKIN TAG")    Physical Exam:   Anatomic Location Affected:  Bilateral axilla   Morphological Description:  Skin colored pedunculated papules   Pertinent Positives:   Pertinent Negatives: Additional History of Present Condition:      Assessment and Plan:  Based on a thorough discussion of this condition and the management approach to it (including a comprehensive discussion of the known risks, side effects and potential benefits of treatment), the patient (family) agrees to implement the following specific plan:   Reassured benign      Skin tags are common, soft, harmless skin lesions that are also called, in the appropriate settings, papillomas, fibroepithelial polyps, and soft fibromas  They are made up of loosely arranged collagen fibers and blood vessels surrounded by a thickened or thinned-out epidermis  Skin tags tend to develop in both men and women as we grow older  They are usually found on the skin folds (neck, armpits, groin)  It is not known what specifically causes skin tags  Certain factors, though, do appear to play a role:   Chaffing and irritation from skin rubbing together   High levels of growth factors (as seen, for example, in pregnancy or in acromegaly/gigantism)   Insulin resistance   Human papillomavirus (wart virus)    We discussed that most skin tags do not need to be treated unless they are specifically causing the patient physical distress or limitation or pose a risk for a larger problem such as an infection that forms secondary to excoriation or chronic irritation  We had a thorough discussion of treatment options and specific risks (including that any procedural treatment may not be covered by insurance and would then be the patient's responsibility) and benefits/alternatives including but not limited to the following:   Cryotherapy (freezing)   Shave removal   Surgical excision (snip excision with scissors)   Electrosurgery   Ligation (we do not do this procedure and counseled against it due to risk of tissue necrosis and infection)    EPIDERMAL INCLUSION CYST    Physical Exam:   Anatomic Location Affected:  Left occiput   Morphological Description:  1 cmm subcutaneous nodule with overlying crust   Pertinent Positives:   Pertinent Negatives:     Additional History of Present Condition:      Assessment and Plan:  Based on a thorough discussion of this condition and the management approach to it (including a comprehensive discussion of the known risks, side effects and potential benefits of treatment), the patient (family) agrees to implement the following specific plan:   Mometasone 0 1% lotion apply to the left occiput 2 times daily for 2 weeks  What are epidermal inclusion cysts? Epidermal inclusion cysts are the most common, benign cutaneous cysts  There are many different names for epidermal inclusion cysts, including epidermoid cyst, epidermal cyst, infundibular cyst, inclusion cyst, and keratin cyst  These cysts can occur anywhere on the body and typically present as nodules directly underneath the skin  There is often a visible pore or opening in the center  The cysts are freely moveable and can range from a few millimeters to several centimeters in diameter  The center of epidermoid cysts almost always contains keratin, which has a cheesy appearance, and not sebum  They also do not originate from sebaceous glands  Therefore, epidermal inclusion cysts are not the same as sebaceous cysts  Cysts may remain stable or progressively enlarge over time  There are no reliable predictive factors to tell if an epidermal inclusion cyst will enlarge, become inflamed, or remain quiescent  Infected cysts tend to become larger, turn red, and are more noticeable to the patient  There may be accompanying pain and discomfort  What causes epidermal inclusion cysts? Epidermal inclusion cysts often appear out of the blue and are not contagious  They are due to a proliferation of epidermal cells within the dermis and are more common in men than women  They occur more frequently in patients in their 20s to 45s  Epidermal inclusion cysts by themselves are usually not inherited, but they can be hereditary in rare syndromes such as Mccollum syndrome, nodular elastosis with cysts and comedones (Favre-Racouchot syndrome), and basal cell nevus syndrome (Gorlin syndrome)  Elderly patients with chronic sun-damaged skin areas have a higher likelihood of developing epidermoid cysts  They often occur in areas where hair follicles have been inflamed or repeatedly irritated are more frequent in patients with acne vulgaris   In the  period, they are called milia  Patients on BRAF inhibitors such as imiquimod and cyclosporine have a higher incidence of epidermoid cysts of the face  How do we diagnose an epidermal inclusion cyst?  Epidermoid inclusion cysts are often diagnosed by history and physical exam  There is usually no need for biopsy prior to removal   Radiographic and laboratory exams, such as ultrasound studies, are unnecessary and not typically ordered unless the practitioner suspects a genetic condition  What is the treatment for an epidermal inclusion cyst?  Inflamed, uninfected epidermal inclusion cysts rarely resolve spontaneously without therapy or surgical intervention  Treatment is not emergent unless desired by the patient  Definitive treatment is via surgical excision with walls intact  This method will prevent recurrence  This is best done when the cyst is not inflamed, to decrease the probability of rupture during surgery  - A local anesthetic will be injected around the cyst  - A small incision is made in the skin overlying the cyst, and contents are expressed  - The incision is repaired with sutures    Another option is to use a 4mm punch biopsy with cyst extraction through the defect  Incision and drainage is often needed if the cyst is infected or inflamed  If there is surrounding cellulitis, oral antibiotic therapy may be necessary  The common agents used target methicillin sensitive Staphylococcal aureus and methicillin resistant S aureus in areas of high prevalence       Scribe Attestation    I,:  Ernie Rees am acting as a scribe while in the presence of the attending physician :       I,:  Price Spatz, MD personally performed the services described in this documentation    as scribed in my presence :

## 2022-02-02 NOTE — RESULT ENCOUNTER NOTE
Surgical Pathology Report                         Case: [id number]                                   Authorizing Provider: Bernadine Jiménez MD      Collected:           01/27/2022 0944              Ordering Location:     [facility]      Received:            01/27/2022 0944                                     [place]                                                                       Pathologist:           Stevo Knox MD                                                           Specimen:    Skin, Other, A: Left Temple                                                              Final Diagnosis  A  Skin, left temple, shave biopsy:     VERRUCA VULGARIS         Electronically signed by Stevo Knox MD on 1/31/2022 at  1:16 PM    DERMATOPATHOLOGY RESULT NOTE    Results reviewed by ordering physician    Sent my chart message with result      Instructions for Clinical Derm Team:   (remember to route Result Note to appropriate staff):    None    Result & Plan by Specimen:    Specimen A: benign  Plan: reassured, benign

## 2022-02-03 ENCOUNTER — ESTABLISHED COMPREHENSIVE EXAM (OUTPATIENT)
Dept: URBAN - METROPOLITAN AREA CLINIC 6 | Facility: CLINIC | Age: 67
End: 2022-02-03

## 2022-02-03 DIAGNOSIS — H16.123: ICD-10-CM

## 2022-02-03 DIAGNOSIS — H04.123: ICD-10-CM

## 2022-02-03 DIAGNOSIS — H25.813: ICD-10-CM

## 2022-02-03 LAB
LEFT EYE DIABETIC RETINOPATHY: NORMAL
RIGHT EYE DIABETIC RETINOPATHY: NORMAL

## 2022-02-03 PROCEDURE — 92014 COMPRE OPH EXAM EST PT 1/>: CPT

## 2022-02-03 ASSESSMENT — VISUAL ACUITY
OU_CC: J1
OD_CC: 20/20
OS_CC: 20/25

## 2022-02-03 ASSESSMENT — TONOMETRY
OS_IOP_MMHG: 14
OD_IOP_MMHG: 11

## 2022-02-23 ENCOUNTER — TELEPHONE (OUTPATIENT)
Dept: DERMATOLOGY | Facility: CLINIC | Age: 67
End: 2022-02-23

## 2022-02-23 NOTE — TELEPHONE ENCOUNTER
Est pt last seen 1/27/21, calling to schedule apt at [facility] ()  Returned call, n/a, lm that this is no available apt at this time and to cb to see if any cancellations and/or if July schedule opens

## 2022-03-16 ENCOUNTER — RA CDI HCC (OUTPATIENT)
Dept: OTHER | Facility: HOSPITAL | Age: 67
End: 2022-03-16

## 2022-03-16 NOTE — PROGRESS NOTES
[address]  coding opportunities       Chart reviewed, no opportunity found: CHART REVIEWED, NO OPPORTUNITY FOUND        Patients Insurance     Medicare Insurance: Medicare

## 2022-03-24 ENCOUNTER — OFFICE VISIT (OUTPATIENT)
Dept: FAMILY MEDICINE CLINIC | Facility: CLINIC | Age: 67
End: 2022-03-24
Payer: COMMERCIAL

## 2022-03-24 VITALS
HEART RATE: 72 BPM | RESPIRATION RATE: 16 BRPM | HEIGHT: 69 IN | DIASTOLIC BLOOD PRESSURE: 66 MMHG | WEIGHT: 250 LBS | TEMPERATURE: 96.8 F | SYSTOLIC BLOOD PRESSURE: 130 MMHG | BODY MASS INDEX: 37.03 KG/M2

## 2022-03-24 DIAGNOSIS — G47.33 OBSTRUCTIVE SLEEP APNEA: ICD-10-CM

## 2022-03-24 DIAGNOSIS — K76.0 FATTY LIVER: ICD-10-CM

## 2022-03-24 DIAGNOSIS — E78.2 MIXED HYPERLIPIDEMIA: ICD-10-CM

## 2022-03-24 DIAGNOSIS — Z86.711 HISTORY OF PULMONARY EMBOLUS (PE): ICD-10-CM

## 2022-03-24 DIAGNOSIS — E11.9 TYPE 2 DIABETES MELLITUS WITHOUT COMPLICATION, WITHOUT LONG-TERM CURRENT USE OF INSULIN (HCC): Primary | ICD-10-CM

## 2022-03-24 DIAGNOSIS — I10 BENIGN ESSENTIAL HYPERTENSION: ICD-10-CM

## 2022-03-24 DIAGNOSIS — E66.01 OBESITY, MORBID (HCC): ICD-10-CM

## 2022-03-24 PROCEDURE — 4010F ACE/ARB THERAPY RXD/TAKEN: CPT | Performed by: FAMILY MEDICINE

## 2022-03-24 PROCEDURE — 1003F LEVEL OF ACTIVITY ASSESS: CPT | Performed by: FAMILY MEDICINE

## 2022-03-24 PROCEDURE — 3078F DIAST BP <80 MM HG: CPT | Performed by: FAMILY MEDICINE

## 2022-03-24 PROCEDURE — 99214 OFFICE O/P EST MOD 30 MIN: CPT | Performed by: FAMILY MEDICINE

## 2022-03-24 PROCEDURE — 4004F PT TOBACCO SCREEN RCVD TLK: CPT | Performed by: FAMILY MEDICINE

## 2022-03-24 PROCEDURE — 3008F BODY MASS INDEX DOCD: CPT | Performed by: FAMILY MEDICINE

## 2022-03-24 PROCEDURE — 3075F SYST BP GE 130 - 139MM HG: CPT | Performed by: FAMILY MEDICINE

## 2022-03-24 PROCEDURE — 1160F RVW MEDS BY RX/DR IN RCRD: CPT | Performed by: FAMILY MEDICINE

## 2022-03-24 RX ORDER — LISINOPRIL 20 MG/1
20 TABLET ORAL DAILY
Qty: 90 TABLET | Refills: 3 | Status: SHIPPED | OUTPATIENT
Start: 2022-03-24

## 2022-03-24 RX ORDER — ATORVASTATIN CALCIUM 10 MG/1
10 TABLET, FILM COATED ORAL DAILY
Qty: 90 TABLET | Refills: 3 | Status: SHIPPED | OUTPATIENT
Start: 2022-03-24

## 2022-03-24 NOTE — PROGRESS NOTES
Assessment/Plan:         Diagnoses and all orders for this visit:    Type 2 diabetes mellitus without complication, without long-term current use of insulin (HCC)  -     Hemoglobin A1C    Benign essential hypertension  -     lisinopril (ZESTRIL) 20 mg tablet; Take 1 tablet (20 mg total) by mouth daily  -     Comprehensive metabolic panel  -     CBC and differential    Mixed hyperlipidemia  -     atorvastatin (LIPITOR) 10 mg tablet; Take 1 tablet (10 mg total) by mouth daily  -     Lipid panel  -     TSH, 3rd generation with Free T4 reflex    Obstructive sleep apnea    Fatty liver    Obesity, morbid (HCC)    History of pulmonary embolus (PE)          Continue with current medications  OV 6 months with repeat labs  BMI Counseling: Body mass index is 37 46 kg/m²  The BMI is above normal  Nutrition recommendations include reducing portion sizes, decreasing overall calorie intake, consuming healthier snacks, moderation in carbohydrate intake, reducing intake of saturated fat and trans fat and reducing intake of cholesterol  Exercise recommendations include exercising 3-5 times per week  Patient ID: Jay Alonso is a 74 y o  male  Followup visit  Medications reviewed  Type 2 DM diet controlled  02/2022   A1c 6 4  Urine albumin/creatinine ratio normal at 25 7  on ACE  no DPN  Current with eye exam  No history of retinopathy  Hypertension blood pressures have been stable on Lisinopril 10 mg daily  02/2022 creatinine 0 98  GFR 79  Electrolytes normal  Hgb 12 9  Hyperlipidemia mixed type on Atorvastatin 10 mg daily and 2 fish oils/day   Lipid profile 02/2022 cholesterol 118  Triglycerides 120  HDL 34  LDL 60  LFTs normal   08/2019 TSH 1 500          The following portions of the patient's history were reviewed and updated as appropriate: allergies, current medications, past family history, past medical history, past social history, past surgical history and problem list     Review of Systems Constitutional: Positive for unexpected weight change (14 lb weight gain from 09/2021)  Negative for appetite change, chills, fatigue and fever  HENT: Negative for congestion, ear pain, hearing loss, rhinorrhea, sore throat and trouble swallowing  Eyes: Negative for visual disturbance  Respiratory: Positive for apnea  Negative for cough, shortness of breath and wheezing  OSMAN he uses CPAP   S/p admission 06/2018 for bilateral pulmonary emboli  Venous Dopplers were not done in the hospital   PE unprovoked with no history of recent travel  no hospitalizations or surgeries  No family history of DVT/PE  No personal history of DVT or pulmonary embolus  No cancer history  Colonoscopy 04/2013  Workup in the hospital chest x-ray small area of infiltrate/atelectasis left lung base and small left effusion  Probable minimal right pleural effusion  CT scan abdomen and pelvis without contrast similar mild to moderate left hydronephrosis  left renal pelvic calculi seen  Small bilateral effusions and bilateral dependent subsegmental atelectasis  Normal liver, spleen and pancreas  No lymphadenopathy  CT scan chest showed bilateral pulmonary emboli left greater than right in the lower lobes  Pleural effusions left greater than right  Basal atelectasis  Possibly small left lower lobe pulmonary infarct  No evidence of right cardiac pressure overload  Mild pericardial effusion  Echocardiogram normal left ventricular chamber size normal left ventricular systolic function  No regional wall motion abnormalities  Mild concentric LV H  EF 60%  Moderate tricuspid regurgitation  Normal pulmonary artery systolic blood pressure  Mild diastolic dysfunction with normal filling pressures  Small pericardial effusion without evidence of hemodynamic compromise  EKG normal sinus rhythm no acute changes  Patient completed 6 months of Eliquis  07/2018 prothrombin mutation gene and Factor V Leidin negative  08/2019 D dimer < 0 27   Cardiovascular: Negative for chest pain, palpitations and leg swelling  Gastrointestinal: Negative for abdominal pain, blood in stool, constipation, diarrhea, nausea and vomiting  Fatty liver  02/2022 LFTs normal  Colonoscopy 04/2013  Endocrine: Negative for polydipsia and polyuria  Genitourinary: Negative for difficulty urinating         03/2022 PSA 0 84  03/2021 PSA 0 75   Musculoskeletal: Negative for arthralgias and myalgias  Skin: Negative for rash  Allergic/Immunologic: Negative for environmental allergies  Neurological: Negative for dizziness and headaches  Hematological: Negative for adenopathy  Does not bruise/bleed easily  Psychiatric/Behavioral: Negative for dysphoric mood and sleep disturbance  The patient is not nervous/anxious  Objective:    /66   Pulse 72   Temp (!) 96 8 °F (36 °C)   Resp 16   Ht 5' 8 5" (1 74 m)   Wt 113 kg (250 lb)   BMI 37 46 kg/m²     BP Readings from Last 3 Encounters:   03/24/22 130/66   09/21/21 126/74   03/17/21 132/76       Wt Readings from Last 3 Encounters:   03/24/22 113 kg (250 lb)   01/27/22 114 kg (252 lb)   09/21/21 107 kg (236 lb)        Physical Exam  Vitals and nursing note reviewed  Constitutional:       General: He is not in acute distress  Appearance: He is well-developed  HENT:      Right Ear: Tympanic membrane normal       Left Ear: Tympanic membrane normal    Eyes:      General: No scleral icterus  Extraocular Movements: Extraocular movements intact  Conjunctiva/sclera: Conjunctivae normal       Pupils: Pupils are equal, round, and reactive to light  Neck:      Thyroid: No thyroid mass or thyromegaly  Vascular: No carotid bruit or JVD  Trachea: No tracheal deviation  Cardiovascular:      Rate and Rhythm: Normal rate and regular rhythm  Pulses: no weak pulses          Carotid pulses are 2+ on the right side and 2+ on the left side         Dorsalis pedis pulses are 2+ on the right side and 2+ on the left side  Posterior tibial pulses are 2+ on the right side and 2+ on the left side  Heart sounds: Normal heart sounds  No murmur heard  No gallop  Pulmonary:      Effort: Pulmonary effort is normal  No respiratory distress  Breath sounds: Normal breath sounds  No wheezing or rales  Chest:   Breasts:      Right: No supraclavicular adenopathy  Left: No supraclavicular adenopathy  Abdominal:      General: Bowel sounds are normal  There is no distension or abdominal bruit  Palpations: Abdomen is soft  There is no hepatomegaly, splenomegaly or mass  Tenderness: There is no abdominal tenderness  There is no guarding or rebound  Musculoskeletal:      Right lower leg: No edema  Left lower leg: No edema  Feet:      Right foot:      Skin integrity: No ulcer, skin breakdown, erythema, warmth, callus or dry skin  Left foot:      Skin integrity: No ulcer, skin breakdown, erythema, warmth, callus or dry skin  Lymphadenopathy:      Cervical: No cervical adenopathy  Upper Body:      Right upper body: No supraclavicular adenopathy  Left upper body: No supraclavicular adenopathy  Skin:     Findings: No rash  Nails: There is no clubbing  Neurological:      General: No focal deficit present  Mental Status: He is alert and oriented to person, place, and time  Psychiatric:         Mood and Affect: Mood normal          Patient's shoes and socks removed  Right Foot/Ankle   Right Foot Inspection  Skin Exam: skin normal and skin intact  No dry skin, no warmth, no callus, no erythema, no maceration, no abnormal color, no pre-ulcer, no ulcer and no callus  Toe Exam: ROM and strength within normal limits  Sensory   Monofilament testing: intact    Vascular  Capillary refills: < 3 seconds  The right DP pulse is 2+  The right PT pulse is 2+       Left Foot/Ankle  Left Foot Inspection  Skin Exam: skin normal and skin intact  No dry skin, no warmth, no erythema, no maceration, normal color, no pre-ulcer, no ulcer and no callus  Toe Exam: ROM and strength within normal limits  Sensory   Monofilament testing: intact    Vascular  Capillary refills: < 3 seconds  The left DP pulse is 2+  The left PT pulse is 2+       Assign Risk Category  No deformity present  No loss of protective sensation  No weak pulses  Risk: 0

## 2022-08-04 ENCOUNTER — FOLLOW UP (OUTPATIENT)
Dept: URBAN - METROPOLITAN AREA CLINIC 6 | Facility: CLINIC | Age: 67
End: 2022-08-04

## 2022-08-04 DIAGNOSIS — H25.813: ICD-10-CM

## 2022-08-04 DIAGNOSIS — H04.123: ICD-10-CM

## 2022-08-04 DIAGNOSIS — H16.123: ICD-10-CM

## 2022-08-04 DIAGNOSIS — H02.831: ICD-10-CM

## 2022-08-04 DIAGNOSIS — H02.834: ICD-10-CM

## 2022-08-04 PROCEDURE — 92012 INTRM OPH EXAM EST PATIENT: CPT

## 2022-08-04 ASSESSMENT — VISUAL ACUITY
OD_CC: 20/20-1
OU_CC: J1+
OS_CC: 20/25

## 2022-08-04 ASSESSMENT — TONOMETRY
OD_IOP_MMHG: 15
OS_IOP_MMHG: 13

## 2022-09-06 ENCOUNTER — OFFICE VISIT (OUTPATIENT)
Dept: FAMILY MEDICINE CLINIC | Facility: CLINIC | Age: 67
End: 2022-09-06
Payer: COMMERCIAL

## 2022-09-06 ENCOUNTER — TELEPHONE (OUTPATIENT)
Dept: ADMINISTRATIVE | Facility: OTHER | Age: 67
End: 2022-09-06

## 2022-09-06 ENCOUNTER — TELEPHONE (OUTPATIENT)
Dept: FAMILY MEDICINE CLINIC | Facility: CLINIC | Age: 67
End: 2022-09-06

## 2022-09-06 VITALS
SYSTOLIC BLOOD PRESSURE: 128 MMHG | HEART RATE: 72 BPM | BODY MASS INDEX: 34.66 KG/M2 | DIASTOLIC BLOOD PRESSURE: 74 MMHG | OXYGEN SATURATION: 95 % | TEMPERATURE: 96.9 F | HEIGHT: 69 IN | RESPIRATION RATE: 17 BRPM | WEIGHT: 234 LBS

## 2022-09-06 DIAGNOSIS — L02.01 FACIAL ABSCESS: Primary | ICD-10-CM

## 2022-09-06 PROCEDURE — 3074F SYST BP LT 130 MM HG: CPT | Performed by: FAMILY MEDICINE

## 2022-09-06 PROCEDURE — 99213 OFFICE O/P EST LOW 20 MIN: CPT | Performed by: FAMILY MEDICINE

## 2022-09-06 PROCEDURE — 3078F DIAST BP <80 MM HG: CPT | Performed by: FAMILY MEDICINE

## 2022-09-06 RX ORDER — DOXYCYCLINE HYCLATE 100 MG/1
100 CAPSULE ORAL EVERY 12 HOURS SCHEDULED
Qty: 20 CAPSULE | Refills: 0 | Status: SHIPPED | OUTPATIENT
Start: 2022-09-06 | End: 2022-09-16

## 2022-09-06 RX ORDER — PREDNISONE 20 MG/1
40 TABLET ORAL DAILY
Qty: 10 TABLET | Refills: 0 | Status: SHIPPED | OUTPATIENT
Start: 2022-09-06 | End: 2022-09-11

## 2022-09-06 NOTE — TELEPHONE ENCOUNTER
Yes, dr Carson Fernandes treated him but she wants a phone call back because wife is not happy with just the doxycycline    I spoke with dr Carson Fernandes but she is still insisting for prednisone

## 2022-09-06 NOTE — TELEPHONE ENCOUNTER
Upon review of the In Basket request and the patient's chart, initial outreach has been made via fax, please see Contacts section for details       Thank you  Elly Hanna

## 2022-09-06 NOTE — PROGRESS NOTES
Assessment/Plan:    No problem-specific Assessment & Plan notes found for this encounter  Problem List Items Addressed This Visit    None     Visit Diagnoses     Facial abscess    -  Primary    Relevant Medications    doxycycline hyclate (VIBRAMYCIN) 100 mg capsule          Start Doxycycline 100mg BID for 10 days  Continue warm compress  Follow up if no improvement  Re-establish with dermatology  Subjective:      Patient ID: Jaskaran Bocanegra is a 74 y o  male  Two days of right-sided facial abscess  Patient will occasionally get these 4 times per year  Typically goes to urgent care for these issues  Has established with Dermatology in the past but is not been seen since January  Denies any difficulty swallowing, trouble speaking or facial pain  Feels tightening of the skin at the abscess location  Denies any draining  Denies any fevers cough shortness of breath  The following portions of the patient's history were reviewed and updated as appropriate: allergies, current medications, past family history, past medical history, past social history, past surgical history and problem list     Review of Systems   Constitutional: Negative for chills, fatigue and fever  HENT: Negative for ear pain, facial swelling, sinus pressure, sinus pain, sore throat and trouble swallowing  Right sided abscess-face         Objective:      /74 (BP Location: Left arm, Patient Position: Sitting, Cuff Size: Large)   Pulse 72   Temp (!) 96 9 °F (36 1 °C)   Resp 17   Ht 5' 9" (1 753 m)   Wt 106 kg (234 lb)   SpO2 95%   BMI 34 56 kg/m²          Physical Exam  Vitals and nursing note reviewed  Constitutional:       Appearance: He is obese  HENT:      Head:      Jaw: No tenderness or pain on movement  Comments: Abscess      Mouth/Throat:      Mouth: Mucous membranes are moist  No oral lesions  Dentition: No gingival swelling  Tongue: No lesions  Palate: No mass  Pharynx: No pharyngeal swelling or posterior oropharyngeal erythema  Skin:     General: Skin is warm  Findings: Abscess present  Neurological:      Mental Status: He is alert

## 2022-09-06 NOTE — TELEPHONE ENCOUNTER
----- Message from Lali Lancaster sent at 9/6/2022 10:57 AM EDT -----  Regarding: Diabetic Eye exam  09/06/22 10:58 AM    Hello, our patient Gavin Marrero has had Diabetic Eye Exam completed/performed  Please assist in updating the patient chart by [facility]  The date of service is 2022      Thank you,  Jacinto Crews MA  [facility]

## 2022-09-06 NOTE — LETTER
Diabetic Eye Exam Form    Date Requested: 22  Patient: Florencia Houser  Patient : 1955   Referring Provider: Canelo Simpson MD    DIABETIC Eye Exam Date _______________________________    Type of Exam MUST be documented for Diabetic Eye Exams  Please CHECK ONE  Retinal Exam       Dilated Retinal Exam       OCT       Optomap-Iris Exam      Fundus Photography     Left Eye - Please check Retinopathy AND Type or No Retinopathy      Exam did show retinopathy    Exam did not show retinopathy         Mild     Proliferative           Moderate    Severe            None         Right Eye - Please check Retinopathy AND Type or No Retinopathy     Exam did show retinopathy    Exam did not show retinopathy         Mild     Proliferative        Moderate    Severe        None       Comments __________________________________________________________    Practice Providing Exam ______________________________________________    Exam Performed By (print name) _______________________________________      Provider Signature ___________________________________________________    These reports are needed for  compliance  Please fax this completed form and a copy of the Diabetic Eye Exam report to our office located at [place] [address] as soon as possible via [phone] tyler Robin Baez: Phone [phone]  We thank you for your assistance in treating our mutual patient

## 2022-09-06 NOTE — TELEPHONE ENCOUNTER
Patient called today is insisting that he wants prednisone called in also  Maddison Arshad He was seen for abscess on face today and was given doxycycline  Wife said in the past he was always given both doxy and prednisone    please advise and call into [facility] if need to

## 2022-09-07 NOTE — TELEPHONE ENCOUNTER
Upon review of the In Basket request we were able to locate, review, and update the patient chart as requested for Diabetic Eye Exam     Any additional questions or concerns should be emailed to the Practice Liaisons via [email or web address] email, please do not reply via In Basket      Thank you  Ania Salter

## 2022-09-20 LAB — HBA1C MFR BLD HPLC: 6.4 %

## 2022-09-26 ENCOUNTER — OFFICE VISIT (OUTPATIENT)
Dept: FAMILY MEDICINE CLINIC | Facility: CLINIC | Age: 67
End: 2022-09-26
Payer: COMMERCIAL

## 2022-09-26 VITALS
OXYGEN SATURATION: 96 % | TEMPERATURE: 97.4 F | BODY MASS INDEX: 35.1 KG/M2 | HEART RATE: 70 BPM | DIASTOLIC BLOOD PRESSURE: 78 MMHG | SYSTOLIC BLOOD PRESSURE: 128 MMHG | RESPIRATION RATE: 16 BRPM | HEIGHT: 69 IN | WEIGHT: 237 LBS

## 2022-09-26 DIAGNOSIS — Z12.5 SCREENING FOR PROSTATE CANCER: ICD-10-CM

## 2022-09-26 DIAGNOSIS — E78.2 MIXED HYPERLIPIDEMIA: ICD-10-CM

## 2022-09-26 DIAGNOSIS — Z00.00 MEDICARE ANNUAL WELLNESS VISIT, SUBSEQUENT: ICD-10-CM

## 2022-09-26 DIAGNOSIS — E66.01 OBESITY, MORBID (HCC): ICD-10-CM

## 2022-09-26 DIAGNOSIS — E11.9 TYPE 2 DIABETES MELLITUS WITHOUT COMPLICATION, WITHOUT LONG-TERM CURRENT USE OF INSULIN (HCC): Primary | ICD-10-CM

## 2022-09-26 DIAGNOSIS — Z23 ENCOUNTER FOR IMMUNIZATION: ICD-10-CM

## 2022-09-26 DIAGNOSIS — Z13.89 ENCOUNTER FOR SCREENING FOR OTHER DISORDER: ICD-10-CM

## 2022-09-26 DIAGNOSIS — G47.33 OBSTRUCTIVE SLEEP APNEA: ICD-10-CM

## 2022-09-26 DIAGNOSIS — K76.0 FATTY LIVER: ICD-10-CM

## 2022-09-26 DIAGNOSIS — I10 BENIGN ESSENTIAL HYPERTENSION: ICD-10-CM

## 2022-09-26 PROCEDURE — 1101F PT FALLS ASSESS-DOCD LE1/YR: CPT | Performed by: FAMILY MEDICINE

## 2022-09-26 PROCEDURE — G0008 ADMIN INFLUENZA VIRUS VAC: HCPCS

## 2022-09-26 PROCEDURE — G0439 PPPS, SUBSEQ VISIT: HCPCS | Performed by: FAMILY MEDICINE

## 2022-09-26 PROCEDURE — 1125F AMNT PAIN NOTED PAIN PRSNT: CPT | Performed by: FAMILY MEDICINE

## 2022-09-26 PROCEDURE — 90662 IIV NO PRSV INCREASED AG IM: CPT

## 2022-09-26 PROCEDURE — 1170F FXNL STATUS ASSESSED: CPT | Performed by: FAMILY MEDICINE

## 2022-09-26 PROCEDURE — 99214 OFFICE O/P EST MOD 30 MIN: CPT | Performed by: FAMILY MEDICINE

## 2022-09-26 PROCEDURE — G0444 DEPRESSION SCREEN ANNUAL: HCPCS | Performed by: FAMILY MEDICINE

## 2022-09-26 PROCEDURE — 3288F FALL RISK ASSESSMENT DOCD: CPT | Performed by: FAMILY MEDICINE

## 2022-09-26 PROCEDURE — 1003F LEVEL OF ACTIVITY ASSESS: CPT | Performed by: FAMILY MEDICINE

## 2022-09-26 PROCEDURE — 1160F RVW MEDS BY RX/DR IN RCRD: CPT | Performed by: FAMILY MEDICINE

## 2022-09-26 PROCEDURE — 3725F SCREEN DEPRESSION PERFORMED: CPT | Performed by: FAMILY MEDICINE

## 2022-09-26 NOTE — PATIENT INSTRUCTIONS
Medicare Preventive Visit Patient Instructions  Thank you for completing your Welcome to Medicare Visit or Medicare Annual Wellness Visit today  Your next wellness visit will be due in one year (9/27/2023)  The screening/preventive services that you may require over the next 5-10 years are detailed below  Some tests may not apply to you based off risk factors and/or age  Screening tests ordered at today's visit but not completed yet may show as past due  Also, please note that scanned in results may not display below  Preventive Screenings:  Service Recommendations Previous Testing/Comments   Colorectal Cancer Screening  · Colonoscopy    · Fecal Occult Blood Test (FOBT)/Fecal Immunochemical Test (FIT)  · Fecal DNA/Cologuard Test  · Flexible Sigmoidoscopy Age: 39-70 years old   Colonoscopy: every 10 years (May be performed more frequently if at higher risk)  OR  FOBT/FIT: every 1 year  OR  Cologuard: every 3 years  OR  Sigmoidoscopy: every 5 years  Screening may be recommended earlier than age 39 if at higher risk for colorectal cancer  Also, an individualized decision between you and your healthcare provider will decide whether screening between the ages of 74-80 would be appropriate   Colonoscopy: Not on file  FOBT/FIT: Not on file  Cologuard: Not on file  Sigmoidoscopy: Not on file          Prostate Cancer Screening Individualized decision between patient and health care provider in men between ages of 53-78   Medicare will cover every 12 months beginning on the day after your 50th birthday PSA: No results in last 5 years           Hepatitis C Screening Once for adults born between 1945 and 1965  More frequently in patients at high risk for Hepatitis C Hep C Antibody: 03/04/2021    Screening Current   Diabetes Screening 1-2 times per year if you're at risk for diabetes or have pre-diabetes Fasting glucose: No results in last 5 years (No results in last 5 years)  A1C: 6 4 % (9/20/2022)  Screening Not Indicated  History Diabetes   Cholesterol Screening Once every 5 years if you don't have a lipid disorder  May order more often based on risk factors  Lipid panel: 09/20/2022  Screening Not Indicated  History Lipid Disorder      Other Preventive Screenings Covered by Medicare:  1  Abdominal Aortic Aneurysm (AAA) Screening: covered once if your at risk  You're considered to be at risk if you have a family history of AAA or a male between the age of 73-68 who smoking at least 100 cigarettes in your lifetime  2  Lung Cancer Screening: covers low dose CT scan once per year if you meet all of the following conditions: (1) Age 50-69; (2) No signs or symptoms of lung cancer; (3) Current smoker or have quit smoking within the last 15 years; (4) You have a tobacco smoking history of at least 20 pack years (packs per day x number of years you smoked); (5) You get a written order from a healthcare provider  3  Glaucoma Screening: covered annually if you're considered high risk: (1) You have diabetes OR (2) Family history of glaucoma OR (3)  aged 48 and older OR (3)  American aged 72 and older  3  Osteoporosis Screening: covered every 2 years if you meet one of the following conditions: (1) Have a vertebral abnormality; (2) On glucocorticoid therapy for more than 3 months; (3) Have primary hyperparathyroidism; (4) On osteoporosis medications and need to assess response to drug therapy  5  HIV Screening: covered annually if you're between the age of 12-76  Also covered annually if you are younger than 13 and older than 72 with risk factors for HIV infection  For pregnant patients, it is covered up to 3 times per pregnancy      Immunizations:  Immunization Recommendations   Influenza Vaccine Annual influenza vaccination during flu season is recommended for all persons aged >= 6 months who do not have contraindications   Pneumococcal Vaccine   * Pneumococcal conjugate vaccine = PCV13 (Prevnar 13), PCV15 (Vaxneuvance), PCV20 (Prevnar 20)  * Pneumococcal polysaccharide vaccine = PPSV23 (Pneumovax) Adults 2364 years old: 1-3 doses may be recommended based on certain risk factors  Adults 72 years old: 1-2 doses may be recommended based off what pneumonia vaccine you previously received   Hepatitis B Vaccine 3 dose series if at intermediate or high risk (ex: diabetes, end stage renal disease, liver disease)   Tetanus (Td) Vaccine - COST NOT COVERED BY MEDICARE PART B Following completion of primary series, a booster dose should be given every 10 years to maintain immunity against tetanus  Td may also be given as tetanus wound prophylaxis  Tdap Vaccine - COST NOT COVERED BY MEDICARE PART B Recommended at least once for all adults  For pregnant patients, recommended with each pregnancy  Shingles Vaccine (Shingrix) - COST NOT COVERED BY MEDICARE PART B  2 shot series recommended in those aged 48 and above     Health Maintenance Due:      Topic Date Due    Colorectal Cancer Screening  03/24/2023 (Originally 10/11/1998)    Hepatitis C Screening  Completed     Immunizations Due:      Topic Date Due    Influenza Vaccine (1) 09/01/2022     Advance Directives   What are advance directives? Advance directives are legal documents that state your wishes and plans for medical care  These plans are made ahead of time in case you lose your ability to make decisions for yourself  Advance directives can apply to any medical decision, such as the treatments you want, and if you want to donate organs  What are the types of advance directives? There are many types of advance directives, and each state has rules about how to use them  You may choose a combination of any of the following:  · Living will: This is a written record of the treatment you want  You can also choose which treatments you do not want, which to limit, and which to stop at a certain time  This includes surgery, medicine, IV fluid, and tube feedings  · Durable power of  for healthcare [facility]): This is a written record that states who you want to make healthcare choices for you when you are unable to make them for yourself  This person, called a proxy, is usually a family member or a friend  You may choose more than 1 proxy  · Do not resuscitate (DNR) order:  A DNR order is used in case your heart stops beating or you stop breathing  It is a request not to have certain forms of treatment, such as CPR  A DNR order may be included in other types of advance directives  · Medical directive: This covers the care that you want if you are in a coma, near death, or unable to make decisions for yourself  You can list the treatments you want for each condition  Treatment may include pain medicine, surgery, blood transfusions, dialysis, IV or tube feedings, and a ventilator (breathing machine)  · Values history: This document has questions about your views, beliefs, and how you feel and think about life  This information can help others choose the care that you would choose  Why are advance directives important? An advance directive helps you control your care  Although spoken wishes may be used, it is better to have your wishes written down  Spoken wishes can be misunderstood, or not followed  Treatments may be given even if you do not want them  An advance directive may make it easier for your family to make difficult choices about your care  Cigarette Smoking and Your Health   Risks to your health if you smoke:  Nicotine and other chemicals found in tobacco damage every cell in your body  Even if you are a light smoker, you have an increased risk for cancer, heart disease, and lung disease  If you are pregnant or have diabetes, smoking increases your risk for complications  Benefits to your health if you stop smoking:   · You decrease respiratory symptoms such as coughing, wheezing, and shortness of breath     · You reduce your risk for cancers of the lung, mouth, throat, kidney, bladder, pancreas, stomach, and cervix  If you already have cancer, you increase the benefits of chemotherapy  You also reduce your risk for cancer returning or a second cancer from developing  · You reduce your risk for heart disease, blood clots, heart attack, and stroke  · You reduce your risk for lung infections, and diseases such as pneumonia, asthma, chronic bronchitis, and emphysema  · Your circulation improves  More oxygen can be delivered to your body  If you have diabetes, you lower your risk for complications, such as kidney, artery, and eye diseases  You also lower your risk for nerve damage  Nerve damage can lead to amputations, poor vision, and blindness  · You improve your body's ability to heal and to fight infections  For more information and support to stop smoking:   · [employer]  Phone: 7- [phone]  Web Address: [employer]  Weight Management   Why it is important to manage your weight:  Being overweight increases your risk of health conditions such as heart disease, high blood pressure, type 2 diabetes, and certain types of cancer  It can also increase your risk for osteoarthritis, sleep apnea, and other respiratory problems  Aim for a slow, steady weight loss  Even a small amount of weight loss can lower your risk of health problems  How to lose weight safely:  A safe and healthy way to lose weight is to eat fewer calories and get regular exercise  You can lose up about 1 pound a week by decreasing the number of calories you eat by 500 calories each day  Healthy meal plan for weight management:  A healthy meal plan includes a variety of foods, contains fewer calories, and helps you stay healthy  A healthy meal plan includes the following:  · Eat whole-grain foods more often  A healthy meal plan should contain fiber  Fiber is the part of grains, fruits, and vegetables that is not broken down by your body   Whole-grain foods are healthy and provide extra fiber in your diet  Some examples of whole-grain foods are whole-wheat breads and pastas, oatmeal, brown rice, and bulgur  · Eat a variety of vegetables every day  Include dark, leafy greens such as spinach, kale, priscila greens, and mustard greens  Eat yellow and orange vegetables such as carrots, sweet potatoes, and winter squash  · Eat a variety of fruits every day  Choose fresh or canned fruit (canned in its own juice or light syrup) instead of juice  Fruit juice has very little or no fiber  · Eat low-fat dairy foods  Drink fat-free (skim) milk or 1% milk  Eat fat-free yogurt and low-fat cottage cheese  Try low-fat cheeses such as mozzarella and other reduced-fat cheeses  · Choose meat and other protein foods that are low in fat  Choose beans or other legumes such as split peas or lentils  Choose fish, skinless poultry (chicken or turkey), or lean cuts of red meat (beef or pork)  Before you cook meat or poultry, cut off any visible fat  · Use less fat and oil  Try baking foods instead of frying them  Add less fat, such as margarine, sour cream, regular salad dressing and mayonnaise to foods  Eat fewer high-fat foods  Some examples of high-fat foods include french fries, doughnuts, ice cream, and cakes  · Eat fewer sweets  Limit foods and drinks that are high in sugar  This includes candy, cookies, regular soda, and sweetened drinks  Exercise:  Exercise at least 30 minutes per day on most days of the week  Some examples of exercise include walking, biking, dancing, and swimming  You can also fit in more physical activity by taking the stairs instead of the elevator or parking farther away from stores  Ask your healthcare provider about the best exercise plan for you  © Copyright [employer] 2018 Information is for End User's use only and may not be sold, redistributed or otherwise used for commercial purposes   All illustrations and images included in CareNotes® are the copyrighted property of [employer]  or [address] [place], [place] [address]

## 2022-09-26 NOTE — PROGRESS NOTES
Assessment and Plan:     Problem List Items Addressed This Visit        Digestive    Fatty liver       Endocrine    Diabetes mellitus, type 2 ([facility] ) - Primary    Relevant Orders    Hemoglobin A1C       Respiratory    Obstructive sleep apnea       Cardiovascular and Mediastinum    Benign essential hypertension    Relevant Orders    Comprehensive metabolic panel    CBC and differential       Other    Mixed hyperlipidemia    Relevant Orders    Lipid panel    Obesity, morbid ([facility] )      Other Visit Diagnoses     Medicare annual wellness visit, subsequent        Encounter for immunization        Relevant Orders    influenza vaccine, high-dose, PF 0 7 mL (FLUZONE HIGH-DOSE) (Completed)    Screening for prostate cancer        Relevant Orders    PSA, Total Screen    Encounter for screening for other disorder            Continue with current medications  OV 6 months with labs  He is due for a colonoscopy          Preventive health issues were discussed with patient, and age appropriate screening tests were ordered as noted in patient's After Visit Summary  Personalized health advice and appropriate referrals for health education or preventive services given if needed, as noted in patient's After Visit Summary  History of Present Illness:     Patient presents for a Medicare Wellness Visit    Followup visit  Medications reviewed  Type 2 DM diet controlled  09/2022   A1c 6 4  02/2022 Urine albumin/creatinine ratio normal at 25 7  on ACE  no DPN  Current with eye exam  No history of retinopathy  Hypertension blood pressures have been stable on Lisinopril 10 mg daily  09/2022 creatinine 0 90  GFR 93  Electrolytes normal except for Na+ 134  Hgb 13 4  Hyperlipidemia mixed type on Atorvastatin 10 mg daily and 2 fish oils/day   Lipid profile 09/2022 cholesterol 119  Triglycerides 152 increased from 120  HDL 34  LDL 55   LFTs normal  TSH 0 74       Patient Care Team:  Jade Salazar MD as PCP - General LALO Argueta MD (General Surgery)  Bryant Steel MD (Colon and Rectal Surgery)     Review of Systems:     Review of Systems   Constitutional: Positive for unexpected weight change (13 lb weight loss from 03/2022 w/ diet )  Negative for appetite change, chills, fatigue and fever  HENT: Negative for congestion, ear pain, hearing loss, rhinorrhea, sore throat and trouble swallowing  Eyes: Negative for visual disturbance  Respiratory: Positive for apnea  Negative for cough, shortness of breath and wheezing  OSMAN on CPAP  06/2018 admission for bilateral pulmonary emboli  Venous Dopplers were not done in the hospital   PE unprovoked with no history of recent travel  no hospitalizations or surgeries  No family history of DVT/PE  No personal history of DVT or pulmonary embolus  No cancer history  Colonoscopy 04/2013  Workup in the hospital chest x-ray small area of infiltrate/atelectasis left lung base and small left effusion  Probable minimal right pleural effusion  CT scan abdomen and pelvis without contrast similar mild to moderate left hydronephrosis  left renal pelvic calculi seen  Small bilateral effusions and bilateral dependent subsegmental atelectasis  Normal liver, spleen and pancreas  No lymphadenopathy  CT scan chest showed bilateral pulmonary emboli left greater than right in the lower lobes  Pleural effusions left greater than right  Basal atelectasis  Possibly small left lower lobe pulmonary infarct  No evidence of right cardiac pressure overload  Mild pericardial effusion  Echocardiogram normal left ventricular chamber size normal left ventricular systolic function  No regional wall motion abnormalities  Mild concentric LV H  EF 60%  Moderate tricuspid regurgitation  Normal pulmonary artery systolic blood pressure  Mild diastolic dysfunction with normal filling pressures  Small pericardial effusion without evidence of hemodynamic compromise    EKG normal sinus rhythm no acute changes  Patient completed 6 months of Eliquis  07/2018 prothrombin mutation gene and Factor V Leidin negative  08/2019 D dimer < 0 27   Cardiovascular: Negative for chest pain, palpitations and leg swelling  Gastrointestinal: Negative for abdominal pain, blood in stool, constipation, diarrhea, nausea and vomiting  Fatty liver  09/2022 LFTs normal  Colonoscopy 04/2013  Recent colorectal surgery OV for banding of hemorrhoids  Endocrine: Negative for polydipsia and polyuria  Genitourinary: Negative for difficulty urinating         03/2022 PSA 0 84  03/2021 PSA 0 75   Musculoskeletal: Negative for arthralgias and myalgias  Skin: Negative for rash  Allergic/Immunologic: Negative for environmental allergies  Neurological: Negative for dizziness and headaches  Hematological: Negative for adenopathy  Does not bruise/bleed easily  Psychiatric/Behavioral: Negative for dysphoric mood and sleep disturbance  The patient is not nervous/anxious           Problem List:     Patient Active Problem List   Diagnosis    Benign essential hypertension    Diabetes mellitus, type 2 ([facility] )    Mixed hyperlipidemia    Obstructive sleep apnea    ED (erectile dysfunction)    Fatty liver    Renal calculus    Tobacco abuse    Impotence of organic origin    Obesity, morbid ([facility] )    History of pulmonary embolus (PE)      Past Medical and Surgical History:     Past Medical History:   Diagnosis Date    Acute pancreatitis     last assessed 7/20/17    Candidal intertrigo     last assessed 7/20/17    Cholelithiasis     last assessed 7/20/17    Elevated liver enzymes     last assessed 8/29/16    Garrick Escort stone pancreatitis 8/23/2016    GERD (gastroesophageal reflux disease) 7/20/2016    Hydronephrosis, left 8/25/2016    Impaired fasting glucose     last assessed 1/18/16    Nephrolithiasis     last assessed 3/18/13    Pancreatitis, acute 7/2/2017    Pericardial effusion     last assessed 8/29/16    Pulmonary emboli ([facility] ) 6/10/2018     Past Surgical History:   Procedure Laterality Date    HEMORRHOID SURGERY      hemorrhoidectomy    KIDNEY SURGERY Right     LAPAROSCOPIC CHOLECYSTECTOMY      S/P,last assessed 7/20/17    LITHOTRIPSY      renal      Family History:     Family History   Problem Relation Age of Onset    Diabetes type II Father       Social History:     Social History     Socioeconomic History    Marital status:      Spouse name: None    Number of children: None    Years of education: None    Highest education level: None   Occupational History    None   Tobacco Use    Smoking status: Current Every Day Smoker     Packs/day: 0 50     Types: Cigarettes    Smokeless tobacco: Never Used   Substance and Sexual Activity    Alcohol use: No    Drug use: No    Sexual activity: Yes     Partners: Female     Birth control/protection: None   Other Topics Concern    None   Social History Narrative    None     Social Determinants of Health     Financial Resource Strain: Low Risk     Difficulty of Paying Living Expenses: Not hard at all   Food Insecurity: Not on file   Transportation Needs: No Transportation Needs    Lack of Transportation (Medical): No    Lack of Transportation (Non-Medical): No   Physical Activity: Not on file   Stress: Not on file   Social Connections: Not on file   Intimate Partner Violence: Not on file   Housing Stability: Not on file      Medications and Allergies:     Current Outpatient Medications   Medication Sig Dispense Refill    aspirin (ECOTRIN LOW STRENGTH) 81 mg EC tablet Take 81 mg by mouth daily      atorvastatin (LIPITOR) 10 mg tablet Take 1 tablet (10 mg total) by mouth daily 90 tablet 3    lisinopril (ZESTRIL) 20 mg tablet Take 1 tablet (20 mg total) by mouth daily 90 tablet 3    multivitamin (THERAGRAN) TABS Take 1 tablet by mouth      Omega-3 Fatty Acids (FISH OIL PO) Take 1 g by mouth daily       No current facility-administered medications for this visit       No Known Allergies   Immunizations:     Immunization History   Administered Date(s) Administered    COVID-19 PFIZER VACCINE 0 3 ML IM 03/11/2021, 04/01/2021, 11/03/2021    COVID-19 Pfizer vac (Lee-sucrose, gray cap) 12 yr+ IM 08/25/2022    INFLUENZA 10/28/2019, 10/14/2021    Influenza Split High Dose Preservative Free IM 10/28/2019    Influenza, high dose seasonal 0 7 mL 10/28/2019, 09/02/2020, 09/26/2022    Pneumococcal Conjugate 13-Valent 08/21/2019    Pneumococcal Polysaccharide PPV23 09/02/2020    Tdap 08/21/2011, 09/22/2021      Health Maintenance:         Topic Date Due    Colorectal Cancer Screening  03/24/2023 (Originally 10/11/2000)    Hepatitis C Screening  Completed     There are no preventive care reminders to display for this patient  Medicare Screening Tests and Risk Assessments:     Micah Maurer is here for his Subsequent Wellness visit  Last Medicare Wellness visit information reviewed, patient interviewed and updates made to the record today  Health Risk Assessment:   Patient rates overall health as good  Patient feels that their physical health rating is same  Patient is satisfied with their life  Eyesight was rated as same  Hearing was rated as same  Patient feels that their emotional and mental health rating is same  Patients states they are never, rarely angry  Patient states they are never, rarely unusually tired/fatigued  Pain experienced in the last 7 days has been none  Patient states that he has experienced no weight loss or gain in last 6 months  Depression Screening:   PHQ-2 Score: 0      Fall Risk Screening: In the past year, patient has experienced: no history of falling in past year      Home Safety:  Patient does not have trouble with stairs inside or outside of their home  Patient has working smoke alarms and has working carbon monoxide detector  Home safety hazards include: none  Nutrition:   Current diet is Regular       Medications:   Patient is not currently taking any over-the-counter supplements  Patient is able to manage medications  Activities of Daily Living (ADLs)/Instrumental Activities of Daily Living (IADLs):   Walk and transfer into and out of bed and chair?: Yes  Dress and groom yourself?: Yes    Bathe or shower yourself?: Yes    Feed yourself? Yes  Do your laundry/housekeeping?: Yes  Manage your money, pay your bills and track your expenses?: Yes  Make your own meals?: Yes    Do your own shopping?: Yes    Durable Medical Equipment Suppliers  [employer]    Previous Hospitalizations:   Any hospitalizations or ED visits within the last 12 months?: No      Advance Care Planning:   Living will: No    Durable POA for healthcare: No    Advanced directive: No      Cognitive Screening:   Provider or family/friend/caregiver concerned regarding cognition?: No    PREVENTIVE SCREENINGS      Cardiovascular Screening:    General: History Lipid Disorder and Screening Current      Diabetes Screening:     General: Screening Not Indicated and History Diabetes      Colorectal Cancer Screening:     General: Risks and Benefits Discussed    Due for: Colonoscopy - Low Risk      Prostate Cancer Screening:    General: Screening Current      Osteoporosis Screening:    General: Screening Not Indicated      Abdominal Aortic Aneurysm (AAA) Screening:    Risk factors include: age between 73-69 yo and tobacco use        General: Patient Declines and Risks and Benefits Discussed      Lung Cancer Screening:     General: Risks and Benefits Discussed and Patient Declines      Hepatitis C Screening:    General: Screening Current    Screening, Brief Intervention, and Referral to Treatment (SBIRT)    Screening  Typical number of drinks in a day: 0  Typical number of drinks in a week: 0  Interpretation: Low risk drinking behavior      AUDIT-C Screenin) How often did you have a drink containing alcohol in the past year? never  2) How many drinks did you have on a typical day when you were drinking in the past year? 0  3) How often did you have 6 or more drinks on one occasion in the past year? never    AUDIT-C Score: 0  Interpretation: Score 0-3 (male): Negative screen for alcohol misuse    Single Item Drug Screening:  How often have you used an illegal drug (including marijuana) or a prescription medication for non-medical reasons in the past year? never    Single Item Drug Screen Score: 0  Interpretation: Negative screen for possible drug use disorder    Brief Intervention  Alcohol & drug use screenings were reviewed  No concerns regarding substance use disorder identified  Other Counseling Topics:   Regular weightbearing exercise and calcium and vitamin D intake  No exam data present     Physical Exam:     /78   Pulse 70   Temp (!) 97 4 °F (36 3 °C)   Resp 16   Ht 5' 9" (1 753 m)   Wt 108 kg (237 lb)   SpO2 96%   BMI 35 00 kg/m²     BP Readings from Last 3 Encounters:   09/26/22 128/78   09/06/22 128/74   03/24/22 130/66     Wt Readings from Last 3 Encounters:   09/26/22 108 kg (237 lb)   09/06/22 106 kg (234 lb)   03/24/22 113 kg (250 lb)         Physical Exam  Vitals and nursing note reviewed  Constitutional:       Appearance: He is well-developed  HENT:      Head: Normocephalic and atraumatic  Right Ear: Tympanic membrane and ear canal normal       Left Ear: Tympanic membrane and ear canal normal    Eyes:      General: No scleral icterus  Conjunctiva/sclera: Conjunctivae normal    Cardiovascular:      Rate and Rhythm: Normal rate and regular rhythm  Heart sounds: No murmur heard  Pulmonary:      Effort: Pulmonary effort is normal  No respiratory distress  Breath sounds: Normal breath sounds  Abdominal:      General: Abdomen is protuberant  There is no distension  Palpations: Abdomen is soft  There is no hepatomegaly, splenomegaly or mass  Tenderness: There is no abdominal tenderness  There is no guarding or rebound        Hernia: A hernia is present  Hernia is present in the umbilical area  Musculoskeletal:      Cervical back: Neck supple  Skin:     General: Skin is warm and dry  Neurological:      Mental Status: He is alert     Psychiatric:         Mood and Affect: Mood normal          Behavior: Behavior normal          Cognition and Memory: Cognition normal           Trixie Walton MD

## 2022-10-01 ENCOUNTER — NURSE TRIAGE (OUTPATIENT)
Dept: OTHER | Facility: OTHER | Age: 67
End: 2022-10-01

## 2022-10-01 NOTE — TELEPHONE ENCOUNTER
Reason for Disposition   Looks like a boil, infected sore, deep ulcer or other infected rash    Answer Assessment - Initial Assessment Questions  1  APPEARANCE of SWELLING: "What does it look like?" (e g , lymph node, insect bite, mole)      Swelling at the Cyst site   2  SIZE: "How large is the swelling?" (inches, cm or compare to coins)      Smaller than a quarter  3  LOCATION: "Where is the swelling located?"      Right side of face   4  ONSET: "When did the swelling start?"      Ongoing on   5  PAIN: "Is it painful?" If Yes, ask: "How much?"      Denies   6  ITCH: "Does it itch?" If Yes, ask: "How much?"      Denies     7  CAUSE: "What do you think caused the swelling?"      *No Answer*  8  OTHER SYMPTOMS: "Do you have any other symptoms?" (e g , fever)      *No Answer*    Protocols used: SKIN LUMP OR LOCALIZED SWELLING-ADULT-

## 2022-10-01 NOTE — TELEPHONE ENCOUNTER
Regarding: cyst on right jaw line  ----- Message from [facility] sent at 10/1/2022  9:26 AM EDT -----  "My friend has a cyst on his right jaw line  "

## 2022-10-03 NOTE — TELEPHONE ENCOUNTER
Faviola Kennedy to patient today 10/3/2022  He stated that he has a small cyst on the side of his mouth  It is red and swollen, it is not painful  However, patient stated that a small bit of drainage came out when he tried to pop it  Patient was advised not to pop the cyst    He has an appointment scheduled to see Dermatology on Thursday @ 10:30 am and will give us a call after his appointment with dermatology

## 2022-10-06 ENCOUNTER — OFFICE VISIT (OUTPATIENT)
Dept: DERMATOLOGY | Facility: CLINIC | Age: 67
End: 2022-10-06
Payer: COMMERCIAL

## 2022-10-06 VITALS — BODY MASS INDEX: 35.53 KG/M2 | TEMPERATURE: 98.4 F | HEIGHT: 69 IN | WEIGHT: 239.9 LBS

## 2022-10-06 DIAGNOSIS — L72.0 EPIDERMAL CYST: Primary | ICD-10-CM

## 2022-10-06 PROCEDURE — 11900 INJECT SKIN LESIONS </W 7: CPT | Performed by: DERMATOLOGY

## 2022-10-06 PROCEDURE — 99213 OFFICE O/P EST LOW 20 MIN: CPT | Performed by: DERMATOLOGY

## 2022-10-06 RX ORDER — DOXYCYCLINE 100 MG/1
100 TABLET ORAL 2 TIMES DAILY
Qty: 60 TABLET | Refills: 0 | Status: SHIPPED | OUTPATIENT
Start: 2022-10-06 | End: 2022-11-05

## 2022-10-06 RX ORDER — TRIAMCINOLONE ACETONIDE 40 MG/ML
40 INJECTION, SUSPENSION INTRA-ARTICULAR; INTRAMUSCULAR ONCE
Status: COMPLETED | OUTPATIENT
Start: 2022-10-06 | End: 2022-10-06

## 2022-10-06 RX ADMIN — TRIAMCINOLONE ACETONIDE 40 MG: 40 INJECTION, SUSPENSION INTRA-ARTICULAR; INTRAMUSCULAR at 11:14

## 2022-10-06 NOTE — PROGRESS NOTES
[facility] Follow Up Note    Patient Name: Ana Beasley  Encounter Date: 10/06/22    Today's Chief Concerns:  Faiza Bauer Concern #1:  Cyst on Jaw      Current Medications:    Current Outpatient Medications:     aspirin (ECOTRIN LOW STRENGTH) 81 mg EC tablet, Take 81 mg by mouth daily, Disp: , Rfl:     atorvastatin (LIPITOR) 10 mg tablet, Take 1 tablet (10 mg total) by mouth daily, Disp: 90 tablet, Rfl: 3    lisinopril (ZESTRIL) 20 mg tablet, Take 1 tablet (20 mg total) by mouth daily, Disp: 90 tablet, Rfl: 3    multivitamin (THERAGRAN) TABS, Take 1 tablet by mouth, Disp: , Rfl:     Omega-3 Fatty Acids (FISH OIL PO), Take 1 g by mouth daily, Disp: , Rfl:     CONSTITUTIONAL:   Vitals:    10/06/22 1026   Height: 5' 9" (1 753 m)             Specific Alerts:    Have you been seen by a [facility] Dermatologist in the last 3 years? YES        No Known Allergies    May we call your Preferred Phone number to discuss your specific medical information? YES    May we leave a detailed message that includes your specific medical information? YES    Have you traveled outside of the [facility] in the past 3 months? No    Do you currently have a pacemaker or defibrillator? No    Do you have any artificial heart valves, joints, plates, screws, rods, stents, pins, etc? No   - If Yes, were any placed within the last 2 years? Do you require any medications prior to a surgical procedure? No   - If Yes, for which procedure? - If Yes, what medications to you require? Are you taking any medications that cause you to bleed more easily ("blood thinners") No    Have you ever experienced a rapid heartbeat with epinephrine? No    Have you ever been treated with "gold" (gold sodium thiomalate) therapy? No    [facility] can help with wrinkles, "laugh lines," facial volume loss, "double chin," "love handles," age spots, and more   Are you interested in learning today about some of the skin enhancement procedures that we offer? (If Yes, please provide more detail) No    Review of Systems:  Have you recently had or currently have any of the following? · Fever or chills: No  · Night Sweats: No  · Headaches: No  · Weight Gain: No  · Weight Loss: No  · Blurry Vision: No  · Nausea: No  · Vomiting: No  · Diarrhea: No  · Blood in Stool: No  · Abdominal Pain: No  · Itchy Skin: No  · Painful Joints: No  · Swollen Joints: No  · Muscle Pain: No  · Irregular Mole: No  · Sun Burn: No  · Dry Skin: No  · Skin Color Changes: No  · Scar or Keloid: No  · Cold Sores/Fever Blisters: No  · Bacterial Infections/MRSA: No  · Anxiety: No  · Depression: No  · Suicidal or Homicidal Thoughts: No      PSYCH: Normal mood and affect  EYES: Normal conjunctiva  ENT: Normal lips and oral mucosa  CARDIOVASCULAR: No edema  RESPIRATORY: Normal respirations  HEME/LYMPH/IMMUNO:  No regional lymphadenopathy except as noted below in ASSESSMENT AND PLAN BY DIAGNOSIS    FULL ORGAN SYSTEM SKIN EXAM (SKIN)   Face Normal except as noted below in Assessment         EPIDERMAL INCLUSION CYST    Physical Exam:   Anatomic Location Affected:  Right jaw   Morphological Description:  3 cm inflamed red draining nodule   Pertinent Positives:   Pertinent Negatives: Additional History of Present Condition:  Patient here for cyst on the right Jaw currently inflamed denies pain, however seeping last night  Assessment and Plan:  Based on a thorough discussion of this condition and the management approach to it (including a comprehensive discussion of the known risks, side effects and potential benefits of treatment), the patient (family) agrees to implement the following specific plan:   Discuss treating injecting with steroid or oral steroids  Side effects of the oral steroids is raises blood pressure, raises blood sugar levels can make rosacea worse   A treatment of kenalog 40 mg was injected into the right jaw     Start Doxycycline 100 mg take 1 tablet 2 times daily for 1 month   Follow up in December  What are epidermal inclusion cysts? Epidermal inclusion cysts are the most common, benign cutaneous cysts  There are many different names for epidermal inclusion cysts, including epidermoid cyst, epidermal cyst, infundibular cyst, inclusion cyst, and keratin cyst  These cysts can occur anywhere on the body and typically present as nodules directly underneath the skin  There is often a visible pore or opening in the center  The cysts are freely moveable and can range from a few millimeters to several centimeters in diameter  The center of epidermoid cysts almost always contains keratin, which has a cheesy appearance, and not sebum  They also do not originate from sebaceous glands  Therefore, epidermal inclusion cysts are not the same as sebaceous cysts  Cysts may remain stable or progressively enlarge over time  There are no reliable predictive factors to tell if an epidermal inclusion cyst will enlarge, become inflamed, or remain quiescent  Infected cysts tend to become larger, turn red, and are more noticeable to the patient  There may be accompanying pain and discomfort  What causes epidermal inclusion cysts? Epidermal inclusion cysts often appear out of the blue and are not contagious  They are due to a proliferation of epidermal cells within the dermis and are more common in men than women  They occur more frequently in patients in their 20s to 45s  Epidermal inclusion cysts by themselves are usually not inherited, but they can be hereditary in rare syndromes such as Mccollum syndrome, nodular elastosis with cysts and comedones (Favre-Racouchot syndrome), and basal cell nevus syndrome (Gorlin syndrome)  Elderly patients with chronic sun-damaged skin areas have a higher likelihood of developing epidermoid cysts   They often occur in areas where hair follicles have been inflamed or repeatedly irritated are more frequent in patients with acne vulgaris  In the  period, they are called milia  Patients on BRAF inhibitors such as imiquimod and cyclosporine have a higher incidence of epidermoid cysts of the face  How do we diagnose an epidermal inclusion cyst?  Epidermoid inclusion cysts are often diagnosed by history and physical exam  There is usually no need for biopsy prior to removal   Radiographic and laboratory exams, such as ultrasound studies, are unnecessary and not typically ordered unless the practitioner suspects a genetic condition  What is the treatment for an epidermal inclusion cyst?  Inflamed, uninfected epidermal inclusion cysts rarely resolve spontaneously without therapy or surgical intervention  Treatment is not emergent unless desired by the patient  Definitive treatment is via surgical excision with walls intact  This method will prevent recurrence  This is best done when the cyst is not inflamed, to decrease the probability of rupture during surgery  - A local anesthetic will be injected around the cyst  - A small incision is made in the skin overlying the cyst, and contents are expressed  - The incision is repaired with sutures    Another option is to use a 4mm punch biopsy with cyst extraction through the defect  Incision and drainage is often needed if the cyst is infected or inflamed  If there is surrounding cellulitis, oral antibiotic therapy may be necessary  The common agents used target methicillin sensitive Staphylococcal aureus and methicillin resistant S aureus in areas of high prevalence  PROCEDURE:  INTRALESIONAL STEROID INJECTION (KENALOG INJECTION)    Purpose: Triamcinolone is a synthetic glucocorticoid corticosteroid that has marked anti-inflammatory action  It is prepared in sterile aqueous suspension suitable for injecting directly into a lesion on or immediately below the skin to treat a dermal inflammatory process  Indications:  It is indicated for alopecia areata; inflammatory acne cysts; discoid lupus erythematosus; keloids and hypertrophic scars; inflammatory lesions of granuloma annulare, lichen planus, lichen simplex chronicus (neurodermatitis), psoriatic plaques, and other localized inflammatory skin conditions  Potential Side Effects: I understand that triamcinolone injection can potentially cause early and/or delayed adverse effects such as:    Pain    Impaired wound healing    Increased hair growth    Bleeding    White or brown marks    Steroid acne    Infection    Telangiectasia    Skin thinning    Cutaneous and subcutaneous lipoatrophy (most common) appearing as skin indentations or dimples around the injection sites a few weeks after treatment     PROCEDURE NOTE:  After verbal and written consent were obtained, the to-be-treated area was wiped and cleaned with rubbing alcohol 70%  Then, a total of 0 4 mL of Kenalog CONCENTRATION:  40 mg/mL   was injected intralesionally into a total of 1 lesion/s on the following anatomic areas:  R jaw using a 1-mL syringe and a 30-gauge needle  There was less than 1 mL of blood loss and little to no discomfort  The area was bandaged with a Band-aid  The patient tolerated the procedure well and remained in the office for observation  With no signs of an adverse reaction, the patient was eventually discharged from clinic        Scribe Attestation    I,:  Otilio Leventhal am acting as a scribe while in the presence of the attending physician :       I,:  Pilar Riddle MD personally performed the services described in this documentation    as scribed in my presence :

## 2022-10-06 NOTE — PATIENT INSTRUCTIONS
EPIDERMAL INCLUSION CYST    Assessment and Plan:  Based on a thorough discussion of this condition and the management approach to it (including a comprehensive discussion of the known risks, side effects and potential benefits of treatment), the patient (family) agrees to implement the following specific plan:  Discuss treating injecting with steroid or oral steroids  Side effects of the oral steroids is raises blood pressure, raises blood sugar levels can make rosacea worse  A treatment of kenalog 40 mg was injected into the right jaw  Start Doxycycline 100 mg take 1 tablet 2 times daily for 1 month  Follow up in December  What are epidermal inclusion cysts? Epidermal inclusion cysts are the most common, benign cutaneous cysts  There are many different names for epidermal inclusion cysts, including epidermoid cyst, epidermal cyst, infundibular cyst, inclusion cyst, and keratin cyst  These cysts can occur anywhere on the body and typically present as nodules directly underneath the skin  There is often a visible pore or opening in the center  The cysts are freely moveable and can range from a few millimeters to several centimeters in diameter  The center of epidermoid cysts almost always contains keratin, which has a cheesy appearance, and not sebum  They also do not originate from sebaceous glands  Therefore, epidermal inclusion cysts are not the same as sebaceous cysts  Cysts may remain stable or progressively enlarge over time  There are no reliable predictive factors to tell if an epidermal inclusion cyst will enlarge, become inflamed, or remain quiescent  Infected cysts tend to become larger, turn red, and are more noticeable to the patient  There may be accompanying pain and discomfort  What causes epidermal inclusion cysts? Epidermal inclusion cysts often appear out of the blue and are not contagious   They are due to a proliferation of epidermal cells within the dermis and are more common in men than women  They occur more frequently in patients in their 20s to 45s  Epidermal inclusion cysts by themselves are usually not inherited, but they can be hereditary in rare syndromes such as Mccollum syndrome, nodular elastosis with cysts and comedones (Favre-Racouchot syndrome), and basal cell nevus syndrome (Gorlin syndrome)  Elderly patients with chronic sun-damaged skin areas have a higher likelihood of developing epidermoid cysts  They often occur in areas where hair follicles have been inflamed or repeatedly irritated are more frequent in patients with acne vulgaris  In the  period, they are called milia  Patients on BRAF inhibitors such as imiquimod and cyclosporine have a higher incidence of epidermoid cysts of the face  How do we diagnose an epidermal inclusion cyst?  Epidermoid inclusion cysts are often diagnosed by history and physical exam  There is usually no need for biopsy prior to removal   Radiographic and laboratory exams, such as ultrasound studies, are unnecessary and not typically ordered unless the practitioner suspects a genetic condition  What is the treatment for an epidermal inclusion cyst?  Inflamed, uninfected epidermal inclusion cysts rarely resolve spontaneously without therapy or surgical intervention  Treatment is not emergent unless desired by the patient  Definitive treatment is via surgical excision with walls intact  This method will prevent recurrence  This is best done when the cyst is not inflamed, to decrease the probability of rupture during surgery  A local anesthetic will be injected around the cyst  A small incision is made in the skin overlying the cyst, and contents are expressed  The incision is repaired with sutures    Another option is to use a 4mm punch biopsy with cyst extraction through the defect  Incision and drainage is often needed if the cyst is infected or inflamed   If there is surrounding cellulitis, oral antibiotic therapy may be necessary  The common agents used target methicillin sensitive Staphylococcal aureus and methicillin resistant S aureus in areas of high prevalence

## 2022-10-26 ENCOUNTER — VBI (OUTPATIENT)
Dept: ADMINISTRATIVE | Facility: OTHER | Age: 67
End: 2022-10-26

## 2022-10-31 DIAGNOSIS — L72.0 EPIDERMAL CYST: ICD-10-CM

## 2022-11-01 RX ORDER — DOXYCYCLINE 100 MG/1
TABLET ORAL
Qty: 60 TABLET | Refills: 0 | Status: SHIPPED | OUTPATIENT
Start: 2022-11-01 | End: 2022-12-01

## 2022-11-12 ENCOUNTER — NURSE TRIAGE (OUTPATIENT)
Dept: OTHER | Facility: OTHER | Age: 67
End: 2022-11-12

## 2022-11-12 NOTE — TELEPHONE ENCOUNTER
Regarding: Inflamed cyst on face   ----- Message from Seema Mendez sent at 11/12/2022  1:44 PM EST -----  "My paul' has a cyst on his jaw and he was given an injection which helped it  Now he has another one and it is large and inflamed he is currently taking antibiotics  "

## 2022-11-28 DIAGNOSIS — L72.0 EPIDERMAL CYST: ICD-10-CM

## 2022-11-29 RX ORDER — DOXYCYCLINE 100 MG/1
TABLET ORAL
Qty: 60 TABLET | Refills: 0 | Status: SHIPPED | OUTPATIENT
Start: 2022-11-29 | End: 2022-12-29

## 2022-12-26 DIAGNOSIS — L72.0 EPIDERMAL CYST: ICD-10-CM

## 2023-01-03 RX ORDER — DOXYCYCLINE 100 MG/1
TABLET ORAL
Qty: 60 TABLET | Refills: 0 | Status: SHIPPED | OUTPATIENT
Start: 2023-01-03 | End: 2023-02-02

## 2023-02-08 ENCOUNTER — FOLLOW UP (OUTPATIENT)
Dept: URBAN - METROPOLITAN AREA CLINIC 6 | Facility: CLINIC | Age: 68
End: 2023-02-08

## 2023-02-08 DIAGNOSIS — H25.813: ICD-10-CM

## 2023-02-08 DIAGNOSIS — H02.831: ICD-10-CM

## 2023-02-08 DIAGNOSIS — H16.123: ICD-10-CM

## 2023-02-08 DIAGNOSIS — H02.834: ICD-10-CM

## 2023-02-08 DIAGNOSIS — H04.123: ICD-10-CM

## 2023-02-08 PROCEDURE — 92014 COMPRE OPH EXAM EST PT 1/>: CPT

## 2023-02-08 ASSESSMENT — TONOMETRY
OS_IOP_MMHG: 11
OD_IOP_MMHG: 14

## 2023-02-08 ASSESSMENT — VISUAL ACUITY
OD_CC: 20/20-2
OS_CC: 20/40
OU_CC: J1+

## 2023-03-07 ENCOUNTER — TELEMEDICINE (OUTPATIENT)
Dept: FAMILY MEDICINE CLINIC | Facility: CLINIC | Age: 68
End: 2023-03-07

## 2023-03-07 ENCOUNTER — TELEPHONE (OUTPATIENT)
Dept: FAMILY MEDICINE CLINIC | Facility: CLINIC | Age: 68
End: 2023-03-07

## 2023-03-07 DIAGNOSIS — U07.1 COVID: Primary | ICD-10-CM

## 2023-03-07 RX ORDER — NIRMATRELVIR AND RITONAVIR 300-100 MG
3 KIT ORAL 2 TIMES DAILY
Qty: 30 TABLET | Refills: 0 | Status: SHIPPED | OUTPATIENT
Start: 2023-03-07 | End: 2023-03-12

## 2023-03-07 NOTE — TELEPHONE ENCOUNTER
Patient's girlfriend reports patient tested covid positive this am with home test  He had a fever of 101  Overnight & extreme fatigue  He wants to discuss paxlovid in a virtual visit if possible  Isolation instructions given to girlfriend       Cell  for virtual     [facility]

## 2023-03-07 NOTE — PROGRESS NOTES
COVID-19 Outpatient Progress Note    Assessment/Plan:    Problem List Items Addressed This Visit    None  Visit Diagnoses     COVID    -  Primary    Relevant Medications    nirmatrelvir & ritonavir (Paxlovid, 300/100,) tablet therapy pack         Disposition:     Discussed symptom directed medication options with patient  Discussed vitamin D, vitamin C, and/or zinc supplementation with patient  Counseled the patient regarding supportive care  They are to call or return to the office if not improving  Patient was advised to hold his atorvastatin while taking Paxlovid and to resume once Paxlovid is completed    Patient meets criteria for PAXLOVID and they have been counseled appropriately according to EUA documentation released by the FDA  After discussion, patient agrees to treatment  Edmundo Lowe is an investigational medicine used to treat mild-to-moderate COVID-19 in adults and children (15years of age and older weighing at least 80 pounds (40 kg)) with positive results of direct SARS-CoV-2 viral testing, and who are at high risk for progression to severe COVID-19, including hospitalization or death  PAXLOVID is investigational because it is still being studied  There is limited information about the safety and effectiveness of using PAXLOVID to treat people with mild-to-moderate COVID-19  The FDA has authorized the emergency use of PAXLOVID for the treatment of mild-tomoderate COVID-19 in adults and children (15years of age and older weighing at least 80 pounds (40 kg)) with a positive test for the virus that causes COVID-19, and who are at high risk for progression to severe COVID-19, including hospitalization or death, under an EUA  What should I tell my healthcare provider before I take PAXLOVID?     Tell your healthcare provider if you:  - Have any allergies  - Have liver or kidney disease  - Are pregnant or plan to become pregnant  - Are breastfeeding a child  - Have any serious illnesses    Tell your healthcare provider about all the medicines you take, including prescription and over-the-counter medicines, vitamins, and herbal supplements  Some medicines may interact with PAXLOVID and may cause serious side effects  Keep a list of your medicines to show your healthcare provider and pharmacist when you get a new medicine  You can ask your healthcare provider or pharmacist for a list of medicines that interact with PAXLOVID  Do not start taking a new medicine without telling your healthcare provider  Your healthcare provider can tell you if it is safe to take PAXLOVID with other medicines  Tell your healthcare provider if you are taking combined hormonal contraceptive  PAXLOVID may affect how your birth control pills work  Females who are able to become pregnant should use another effective alternative form of contraception or an additional barrier method of contraception  Talk to your healthcare provider if you have any questions about contraceptive methods that might be right for you  How do I take PAXLOVID? PAXLOVID consists of 2 medicines: nirmatrelvir and ritonavir  - Take 2 pink tablets of nirmatrelvir with 1 white tablet of ritonavir by mouth 2 times each day (in the morning and in the evening) for 5 days  For each dose, take all 3 tablets at the same time  - If you have kidney disease, talk to your healthcare provider  You may need a different dose  - Swallow the tablets whole  Do not chew, break, or crush the tablets  - Take PAXLOVID with or without food  - Do not stop taking PAXLOVID without talking to your healthcare provider, even if you feel better  - If you miss a dose of PAXLOVID within 8 hours of the time it is usually taken, take it as soon as you remember  If you miss a dose by more than 8 hours, skip the missed dose and take the next dose at your regular time  Do not take 2 doses of PAXLOVID at the same time    - If you take too much PAXLOVID, call your healthcare provider or go to the nearest hospital emergency room right away  - If you are taking a ritonavir- or cobicistat-containing medicine to treat hepatitis C or Human Immunodeficiency Virus (HIV), you should continue to take your medicine as prescribed by your healthcare provider   - Talk to your healthcare provider if you do not feel better or if you feel worse after 5 days  Who should generally not take PAXLOVID? Do not take PAXLOVID if:  You are allergic to nirmatrelvir, ritonavir, or any of the ingredients in PAXLOVID  You are taking any of the following medicines:  - Alfuzosin  - Pethidine, piroxicam, propoxyphene  - Ranolazine  - Amiodarone, dronedarone, flecainide, propafenone, quinidine  - Colchicine  - Lurasidone, pimozide, clozapine  - Dihydroergotamine, ergotamine, methylergonovine  - Lovastatin, simvastatin  - Sildenafil (Revatio®) for pulmonary arterial hypertension (PAH)  - Triazolam, oral midazolam  - Apalutamide  - Carbamazepine, phenobarbital, phenytoin  - Rifampin  - St  Casimiro’s Wort (hypericum perforatum)    What are the important possible side effects of PAXLOVID? Possible side effects of PAXLOVID are:  - Liver Problems  Tell your healthcare provider right away if you have any of these signs and symptoms of liver problems: loss of appetite, yellowing of your skin and the whites of eyes (jaundice), dark-colored urine, pale colored stools and itchy skin, stomach area (abdominal) pain  - Resistance to HIV Medicines  If you have untreated HIV infection, PAXLOVID may lead to some HIV medicines not working as well in the future  - Other possible side effects include: altered sense of taste, diarrhea, high blood pressure, or muscle aches    These are not all the possible side effects of PAXLOVID  Not many people have taken PAXLOVID  Serious and unexpected side effects may happen   Childs Beatrice is still being studied, so it is possible that all of the risks are not known at this time     What other treatment choices are there? Like [place] may allow for the emergency use of other medicines to treat people with COVID-19  Go to [facility] for information on the emergency use of other medicines that are authorized by FDA to treat people with COVID-19  Your healthcare provider may talk with you about clinical trials for which you may be eligible  It is your choice to be treated or not to be treated with PAXLOVID  Should you decide not to receive it or for your child not to receive it, it will not change your standard medical care  What if I am pregnant or breastfeeding? There is no experience treating pregnant women or breastfeeding mothers with PAXLOVID  For a mother and unborn baby, the benefit of taking PAXLOVID may be greater than the risk from the treatment  If you are pregnant, discuss your options and specific situation with your healthcare provider  It is recommended that you use effective barrier contraception or do not have sexual activity while taking PAXLOVID  If you are breastfeeding, discuss your options and specific situation with your healthcare provider  How do I report side effects with PAXLOVID? Contact your healthcare provider if you have any side effects that bother you or do not go away  Report side effects to FDA MedWatch at www fda gov/medwatch or call 8-089-CIY3761 or you can report side effects to [facility]  at the contact information provided below  Website Fax number Telephone number   invi [phone]     How should I store [address]? Store PAXLOVID tablets at room temperature between 68°F to 77°F (20°C to 25°C)      Full fact sheet for patients, parents, and caregivers can be found at: [employer] au    I have spent a total time of 13 minutes on the day of the encounter for this patient including discussing prognosis, instructions for management, patient and family education, risk factor reductions, impressions, counseling/coordination of care, documenting in the medical record and obtaining or reviewing history  Encounter provider: Raven Lindsey MD     Provider located at: [address] [place] [address]  [place] [address]     Recent Visits  No visits were found meeting these conditions  Showing recent visits within past 7 days and meeting all other requirements  Today's Visits  Date Type Provider Dept   03/07/23 Telemedicine Raven Lindsey MD [id number]'s visits and meeting all other requirements  Future Appointments  No visits were found meeting these conditions  Showing future appointments within next 150 days and meeting all other requirements     This virtual check-in was done via [address] and patient was informed that this is a secure, HIPAA-compliant platform  He agrees to proceed  Patient agrees to participate in a virtual check in via telephone or video visit instead of presenting to the office to address urgent/immediate medical needs  Patient is aware this is a billable service  He acknowledged consent and understanding of privacy and security of the video platform  The patient has agreed to participate and understands they can discontinue the visit at any time  After connecting through [facility], the patient was identified by name and date of birth  Lisa Longoria was informed that this was a telemedicine visit and that the exam was being conducted confidentially over secure lines  My office door was closed  No one else was in the room  Lisa Longoria acknowledged consent and understanding of privacy and security of the telemedicine visit   I informed the patient that I have reviewed his record in Epic and presented the opportunity for him to ask any questions regarding the visit today  The patient agreed to participate  Verification of patient location:  Patient is located in the following state in which I hold an active license: [place]    Subjective:   Tj Whipple is a 69 y o  male who is concerned about COVID-19  Patient's symptoms include chills, fatigue, malaise, cough and myalgias  Patient denies fever (99 1f), congestion, rhinorrhea, sore throat, anosmia, loss of taste, shortness of breath, chest tightness, abdominal pain, nausea, vomiting, diarrhea and headaches  - Date of symptom onset: 3/6/2023      COVID-19 vaccination status: Fully vaccinated with booster    Exposure:   Contact with a person who is under investigation (PUI) for or who is positive for COVID-19 within the last 14 days?: No    Hospitalized recently for fever and/or lower respiratory symptoms?: No      Currently a healthcare worker that is involved in direct patient care?: No      Works in a special setting where the risk of COVID-19 transmission may be high? (this may include long-term care, correctional and shelter facilities; homeless shelters; assisted-living facilities and group homes ): No      Resident in a special setting where the risk of COVID-19 transmission may be high? (this may include long-term care, correctional and shelter facilities; homeless shelters; assisted-living facilities and group homes ): No      Lab Results   Component Value Date    [address] [facility] Not Detected 08/03/2021       Review of Systems   Constitutional: Positive for chills and fatigue  Negative for fever (99 1f)  HENT: Negative for congestion, rhinorrhea and sore throat  Respiratory: Positive for cough  Negative for chest tightness and shortness of breath  Gastrointestinal: Negative for abdominal pain, diarrhea, nausea and vomiting  Musculoskeletal: Positive for myalgias  Neurological: Negative for headaches  All other systems reviewed and are negative      Current Outpatient Medications on File Prior to Visit   Medication Sig   • aspirin (ECOTRIN LOW STRENGTH) 81 mg EC tablet Take 81 mg by mouth daily   • atorvastatin (LIPITOR) 10 mg tablet Take 1 tablet (10 mg total) by mouth daily   • lisinopril (ZESTRIL) 20 mg tablet Take 1 tablet (20 mg total) by mouth daily   • multivitamin (THERAGRAN) TABS Take 1 tablet by mouth   • Omega-3 Fatty Acids (FISH OIL PO) Take 1 g by mouth daily       Objective: There were no vitals taken for this visit  Physical Exam  Constitutional:       General: He is not in acute distress  Appearance: Normal appearance  He is normal weight  He is not toxic-appearing or diaphoretic  HENT:      Head: Normocephalic and atraumatic  Nose: Nose normal    Pulmonary:      Effort: Pulmonary effort is normal  No respiratory distress  Skin:     Findings: No rash  Neurological:      General: No focal deficit present  Mental Status: He is alert  Mental status is at baseline         Isiah Hatchet, MD

## 2023-03-29 ENCOUNTER — OFFICE VISIT (OUTPATIENT)
Dept: FAMILY MEDICINE CLINIC | Facility: CLINIC | Age: 68
End: 2023-03-29

## 2023-03-29 VITALS
BODY MASS INDEX: 36.43 KG/M2 | SYSTOLIC BLOOD PRESSURE: 122 MMHG | WEIGHT: 246 LBS | HEIGHT: 69 IN | DIASTOLIC BLOOD PRESSURE: 74 MMHG | TEMPERATURE: 97.5 F | OXYGEN SATURATION: 96 % | HEART RATE: 72 BPM

## 2023-03-29 DIAGNOSIS — I10 BENIGN ESSENTIAL HYPERTENSION: ICD-10-CM

## 2023-03-29 DIAGNOSIS — K76.0 FATTY LIVER: ICD-10-CM

## 2023-03-29 DIAGNOSIS — E66.01 OBESITY, MORBID (HCC): ICD-10-CM

## 2023-03-29 DIAGNOSIS — E11.9 TYPE 2 DIABETES MELLITUS WITHOUT COMPLICATION, WITHOUT LONG-TERM CURRENT USE OF INSULIN (HCC): Primary | ICD-10-CM

## 2023-03-29 DIAGNOSIS — E78.2 MIXED HYPERLIPIDEMIA: ICD-10-CM

## 2023-03-29 RX ORDER — LISINOPRIL 20 MG/1
20 TABLET ORAL DAILY
Qty: 90 TABLET | Refills: 3 | Status: SHIPPED | OUTPATIENT
Start: 2023-03-29

## 2023-03-29 RX ORDER — ATORVASTATIN CALCIUM 10 MG/1
10 TABLET, FILM COATED ORAL DAILY
Qty: 90 TABLET | Refills: 3 | Status: SHIPPED | OUTPATIENT
Start: 2023-03-29

## 2023-03-29 NOTE — PROGRESS NOTES
Name: Nicole Ward      : 1955      MRN: [id number]  Encounter Provider: Petr Beltre MD  Encounter Date: 3/29/2023   Encounter department: [facility]     1  Type 2 diabetes mellitus without complication, without long-term current use of insulin (HCC)  -     Hemoglobin A1C  -     Microalbumin / creatinine urine ratio    2  Benign essential hypertension  -     lisinopril (ZESTRIL) 20 mg tablet; Take 1 tablet (20 mg total) by mouth daily  -     Comprehensive metabolic panel  -     CBC and differential    3  Mixed hyperlipidemia  -     atorvastatin (LIPITOR) 10 mg tablet; Take 1 tablet (10 mg total) by mouth daily  -     Lipid panel    4  Obesity, morbid ([address] )    5  Fatty liver    continue with current medications  OV 6 months with labs    BMI Counseling: Body mass index is 36 33 kg/m²  The BMI is above normal  Nutrition recommendations include reducing portion sizes, decreasing overall calorie intake, consuming healthier snacks, moderation in carbohydrate intake, reducing intake of saturated fat and trans fat and reducing intake of cholesterol  Exercise recommendations include exercising 3-5 times per week  Subjective     Followup visit  Medications reviewed  Type 2 DM diet controlled  [id number] FBS 94  A1c 6 3  2022 Urine albumin/creatinine ratio normal at 25 7  on ACE  no DPN  Current with eye exam  No history of retinopathy  Hypertension blood pressures have been stable on Lisinopril 10 mg daily  2023 creatinine 0 98  GFR 83  Electrolytes normal  Hgb 13 3  Hyperlipidemia mixed type on Atorvastatin 10 mg daily and 2 fish oils/day   Lipid profile 2023 cholesterol 155  Triglycerides 229 increased from 152  HDL 27 LDL 62  LFTs normal   2022 TSH 0 74  Not watching his diet     Review of Systems   Constitutional: Positive for unexpected weight change (9 lb weight gain from 2022 )  Negative for appetite change, chills, fatigue and fever     HENT: Negative for congestion, ear pain, hearing loss, rhinorrhea, sore throat and trouble swallowing  Eyes: Negative for visual disturbance  Respiratory: Positive for apnea  Negative for cough, shortness of breath and wheezing  OSMAN on CPAP  06/2018 admission for bilateral pulmonary emboli  Venous Dopplers were not done in the hospital   PE unprovoked with no history of recent travel  no hospitalizations or surgeries  No family history of DVT/PE  No personal history of DVT or pulmonary embolus  No cancer history  Colonoscopy 04/2013  Workup in the hospital chest x-ray small area of infiltrate/atelectasis left lung base and small left effusion  Probable minimal right pleural effusion  CT scan abdomen and pelvis without contrast similar mild to moderate left hydronephrosis  left renal pelvic calculi seen  Small bilateral effusions and bilateral dependent subsegmental atelectasis  Normal liver, spleen and pancreas  No lymphadenopathy  CT scan chest showed bilateral pulmonary emboli left greater than right in the lower lobes  Pleural effusions left greater than right  Basal atelectasis  Possibly small left lower lobe pulmonary infarct  No evidence of right cardiac pressure overload  Mild pericardial effusion  Echocardiogram normal left ventricular chamber size normal left ventricular systolic function  No regional wall motion abnormalities  Mild concentric LV H  EF 60%  Moderate tricuspid regurgitation  Normal pulmonary artery systolic blood pressure  Mild diastolic dysfunction with normal filling pressures  Small pericardial effusion without evidence of hemodynamic compromise  EKG normal sinus rhythm no acute changes  Patient completed 6 months of Eliquis  07/2018 prothrombin mutation gene and Factor V Leidin negative  08/2019 D dimer < 0 27   Cardiovascular: Negative for chest pain, palpitations and leg swelling     Gastrointestinal: Negative for abdominal pain, blood in stool, constipation, diarrhea, nausea and vomiting  Fatty liver  03/2023 LFTs normal  10/2022  colorectal surgery OV for banding of hemorrhoids  Per patient colonoscopy 102/2022    Endocrine: Negative for polydipsia and polyuria  Genitourinary: Negative for difficulty urinating         03/2023 PSA 0 63  03/2022 PSA 0 84  03/2021 PSA 0 75   Musculoskeletal: Negative for arthralgias and myalgias  Skin: Negative for rash  Allergic/Immunologic: Negative for environmental allergies  Neurological: Negative for dizziness and headaches  Hematological: Negative for adenopathy  Does not bruise/bleed easily  Psychiatric/Behavioral: Negative for dysphoric mood and sleep disturbance  The patient is not nervous/anxious  Past Medical History:   Diagnosis Date   • Acute pancreatitis     last assessed 7/20/17   • Candidal intertrigo     last assessed 7/20/17   • Cholelithiasis     last assessed 7/20/17   • Elevated liver enzymes     last assessed 8/29/16   • Larri Quincy stone pancreatitis 8/23/2016   • GERD (gastroesophageal reflux disease) 7/20/2016   • Hydronephrosis, left 8/25/2016   • Impaired fasting glucose     last assessed 1/18/16   • Nephrolithiasis     last assessed 3/18/13   • Pancreatitis, acute 7/2/2017   • Pericardial effusion     last assessed 8/29/16   • Pulmonary emboli ([address] ) 6/10/2018     Past Surgical History:   Procedure Laterality Date   • HEMORRHOID SURGERY      hemorrhoidectomy   • KIDNEY SURGERY Right    • LAPAROSCOPIC CHOLECYSTECTOMY      S/P,last assessed 7/20/17   • LITHOTRIPSY      renal     Family History   Problem Relation Age of Onset   • Diabetes type II Father      Social History     Socioeconomic History   • Marital status:       Spouse name: None   • Number of children: None   • Years of education: None   • Highest education level: None   Occupational History   • None   Tobacco Use   • Smoking status: Every Day     Packs/day: 0 50     Types: Cigarettes   • Smokeless tobacco: Never "  Substance and Sexual Activity   • Alcohol use: No   • Drug use: No   • Sexual activity: Yes     Partners: Female     Birth control/protection: None   Other Topics Concern   • None   Social History Narrative   • None     Social Determinants of Health     Financial Resource Strain: Low Risk    • Difficulty of Paying Living Expenses: Not hard at all   Food Insecurity: Not on file   Transportation Needs: No Transportation Needs   • Lack of Transportation (Medical): No   • Lack of Transportation (Non-Medical):  No   Physical Activity: Not on file   Stress: Not on file   Social Connections: Not on file   Intimate Partner Violence: Not on file   Housing Stability: Not on file     Current Outpatient Medications on File Prior to Visit   Medication Sig   • aspirin (ECOTRIN LOW STRENGTH) 81 mg EC tablet Take 81 mg by mouth daily   • multivitamin (THERAGRAN) TABS Take 1 tablet by mouth   • Omega-3 Fatty Acids (FISH OIL PO) Take 1 g by mouth daily   • [DISCONTINUED] atorvastatin (LIPITOR) 10 mg tablet Take 1 tablet (10 mg total) by mouth daily   • [DISCONTINUED] lisinopril (ZESTRIL) 20 mg tablet Take 1 tablet (20 mg total) by mouth daily     No Known Allergies  Immunization History   Administered Date(s) Administered   • COVID-19 PFIZER VACCINE 0 3 ML IM 03/11/2021, 04/01/2021, 11/03/2021   • COVID-19 Pfizer vac (Lee-sucrose, gray cap) 12 yr+ IM 08/25/2022   • INFLUENZA 10/28/2019, 10/14/2021   • Influenza Split High Dose Preservative Free IM 10/28/2019   • Influenza, high dose seasonal 0 7 mL 10/28/2019, 09/02/2020, 09/26/2022   • Pneumococcal Conjugate 13-Valent 08/21/2019   • Pneumococcal Polysaccharide PPV23 09/02/2020   • Tdap 08/21/2011, 09/22/2021       Objective     /74 (BP Location: Left arm, Patient Position: Sitting, Cuff Size: Large)   Pulse 72   Temp 97 5 °F (36 4 °C)   Ht 5' 9\" (1 753 m)   Wt 112 kg (246 lb)   SpO2 96%   BMI 36 33 kg/m²     BP Readings from Last 3 Encounters:   03/29/23 122/74 " 09/26/22 128/78   09/06/22 128/74     Wt Readings from Last 3 Encounters:   03/29/23 112 kg (246 lb)   10/06/22 109 kg (239 lb 14 4 oz)   09/26/22 108 kg (237 lb)         Physical Exam  Vitals and nursing note reviewed  Constitutional:       General: He is not in acute distress  Appearance: He is well-developed  HENT:      Right Ear: Tympanic membrane normal       Left Ear: Tympanic membrane normal    Eyes:      General: No scleral icterus  Extraocular Movements: Extraocular movements intact  Conjunctiva/sclera: Conjunctivae normal       Pupils: Pupils are equal, round, and reactive to light  Neck:      Thyroid: No thyroid mass or thyromegaly  Vascular: No carotid bruit or JVD  Trachea: No tracheal deviation  Cardiovascular:      Rate and Rhythm: Normal rate and regular rhythm  Pulses: no weak pulses          Carotid pulses are 2+ on the right side and 2+ on the left side  Dorsalis pedis pulses are 2+ on the right side and 2+ on the left side  Posterior tibial pulses are 2+ on the right side and 2+ on the left side  Heart sounds: Normal heart sounds  No murmur heard  No gallop  Pulmonary:      Effort: Pulmonary effort is normal  No respiratory distress  Breath sounds: Normal breath sounds  No wheezing or rales  Abdominal:      General: Bowel sounds are normal  There is no distension or abdominal bruit  Palpations: Abdomen is soft  There is no hepatomegaly, splenomegaly or mass  Tenderness: There is no abdominal tenderness  There is no guarding or rebound  Hernia: A hernia is present  Hernia is present in the umbilical area  Musculoskeletal:      Right lower leg: No edema  Left lower leg: No edema  Feet:      Right foot:      Skin integrity: No ulcer, skin breakdown, erythema, warmth, callus or dry skin  Left foot:      Skin integrity: No ulcer, skin breakdown, erythema, warmth, callus or dry skin     Lymphadenopathy: Cervical: No cervical adenopathy  Upper Body:      Right upper body: No supraclavicular adenopathy  Left upper body: No supraclavicular adenopathy  Skin:     Findings: No rash  Nails: There is no clubbing  Neurological:      General: No focal deficit present  Mental Status: He is alert and oriented to person, place, and time  Psychiatric:         Mood and Affect: Mood normal        Patient's shoes and socks removed  Right Foot/Ankle   Right Foot Inspection  Skin Exam: skin normal and skin intact  No dry skin, no warmth, no callus, no erythema, no maceration, no abnormal color, no pre-ulcer, no ulcer and no callus  Toe Exam: ROM and strength within normal limits  No swelling, no tenderness, erythema and  no right toe deformity    Sensory   Monofilament testing: intact    Vascular  Capillary refills: < 3 seconds  The right DP pulse is 2+  The right PT pulse is 2+  Left Foot/Ankle  Left Foot Inspection  Skin Exam: skin normal and skin intact  No dry skin, no warmth, no erythema, no maceration, normal color, no pre-ulcer, no ulcer and no callus  Toe Exam: ROM and strength within normal limits  No swelling, no tenderness, no erythema and no left toe deformity  Sensory   Monofilament testing: intact    Vascular  Capillary refills: < 3 seconds  The left DP pulse is 2+  The left PT pulse is 2+       Assign Risk Category  No deformity present  No loss of protective sensation  No weak pulses  Risk: 0    Katie Gonzales MD

## 2023-07-18 ENCOUNTER — OFFICE VISIT (OUTPATIENT)
Age: 68
End: 2023-07-18

## 2023-07-18 VITALS — BODY MASS INDEX: 35.55 KG/M2 | HEIGHT: 69 IN | WEIGHT: 240 LBS

## 2023-07-18 DIAGNOSIS — D48.9 NEOPLASM OF UNCERTAIN BEHAVIOR: Primary | ICD-10-CM

## 2023-07-18 PROCEDURE — 88305 TISSUE EXAM BY PATHOLOGIST: CPT | Performed by: PATHOLOGY

## 2023-07-18 RX ORDER — BROMFENAC SODIUM 0.7 MG/ML
1 SOLUTION/ DROPS OPHTHALMIC DAILY
COMMUNITY
Start: 2023-05-23

## 2023-07-18 RX ORDER — PREDNISOLONE ACETATE 10 MG/ML
SUSPENSION/ DROPS OPHTHALMIC
COMMUNITY
Start: 2023-07-05

## 2023-07-18 NOTE — PATIENT INSTRUCTIONS
III. POST-PROCEDURAL CARE (WHAT YOU WILL NEED TO DO "AFTER THE BIOPSY" TO OPTIMIZE HEALING)    Keep the area clean and dry. Try NOT to remove the bandage or get it wet for the first 24 hours. Gently clean the area and apply surgical ointment (such as Vaseline petrolatum ointment, which is available "over the counter" and not a prescription) to the biopsy site for up to 2 weeks straight. This acts to protect the wound from the outside world. Sutures are not usually placed in this procedure. If any sutures were placed, return for suture removal as instructed (generally 1 week for the face, 2 weeks for the body). Take Acetaminophen (Tylenol) for discomfort, if no contraindications. Ibuprofen or aspirin could make bleeding worse. Call our office immediately for signs of infection: fever, chills, increased redness, warmth, tenderness, discomfort/pain, or pus or foul smell coming from the wound. WHAT TO DO IF THERE IS ANY BLEEDING? If a small amount of bleeding is noticed, place a clean cloth over the area and apply firm pressure for ten minutes. Check the wound after 10 minutes of direct pressure. If bleeding persists, try one more time for an additional 10 minutes of direct pressure on the area. If the bleeding becomes heavier or does not stop after the second attempt, or if you have any other questions about this procedure, then please call your [facility] by calling [phone] (SKIN). I hereby acknowledge that I have reviewed and verified the site with my Dermatologist and have requested and authorized my Dermatologist to proceed with the procedure.

## 2023-07-28 PROCEDURE — 88305 TISSUE EXAM BY PATHOLOGIST: CPT | Performed by: PATHOLOGY

## 2023-08-09 ENCOUNTER — FOLLOW UP (OUTPATIENT)
Dept: URBAN - METROPOLITAN AREA CLINIC 6 | Facility: CLINIC | Age: 68
End: 2023-08-09

## 2023-08-09 DIAGNOSIS — H02.831: ICD-10-CM

## 2023-08-09 DIAGNOSIS — H02.834: ICD-10-CM

## 2023-08-09 DIAGNOSIS — H25.813: ICD-10-CM

## 2023-08-09 DIAGNOSIS — H16.123: ICD-10-CM

## 2023-08-09 DIAGNOSIS — H04.123: ICD-10-CM

## 2023-08-09 PROCEDURE — 92012 INTRM OPH EXAM EST PATIENT: CPT

## 2023-08-09 ASSESSMENT — VISUAL ACUITY
OD_CC: 20/20
OS_CC: 20/25-1

## 2023-08-09 ASSESSMENT — TONOMETRY
OD_IOP_MMHG: 12
OS_IOP_MMHG: 15

## 2023-09-18 ENCOUNTER — TELEPHONE (OUTPATIENT)
Age: 68
End: 2023-09-18

## 2023-09-18 NOTE — TELEPHONE ENCOUNTER
Called pt. No answer. Left voice message to call us back. His 10/19 at 10:50 appt needs to be cancelled and rescheduled. Dr. Emma Scott will not be available on 10/19. Appt needs to be cancelled and rescheduled.

## 2023-09-18 NOTE — TELEPHONE ENCOUNTER
Rec'd return call from patient/and wife. Rescheduled to next available in November. Patient & wife very understanding.

## 2023-09-27 LAB
CREAT ?TM UR-SCNC: 99.7 UMOL/L
EXT ALBUMIN URINE RANDOM: 1.6
HBA1C MFR BLD HPLC: 6.3 %
MICROALBUMIN/CREAT UR: 16 MG/G{CREAT}

## 2023-10-04 ENCOUNTER — OFFICE VISIT (OUTPATIENT)
Dept: FAMILY MEDICINE CLINIC | Facility: CLINIC | Age: 68
End: 2023-10-04
Payer: COMMERCIAL

## 2023-10-04 VITALS
BODY MASS INDEX: 36.36 KG/M2 | SYSTOLIC BLOOD PRESSURE: 130 MMHG | DIASTOLIC BLOOD PRESSURE: 68 MMHG | HEIGHT: 69 IN | OXYGEN SATURATION: 97 % | TEMPERATURE: 97.5 F | WEIGHT: 245.5 LBS | HEART RATE: 72 BPM

## 2023-10-04 DIAGNOSIS — E78.2 MIXED HYPERLIPIDEMIA: ICD-10-CM

## 2023-10-04 DIAGNOSIS — Z23 ENCOUNTER FOR IMMUNIZATION: ICD-10-CM

## 2023-10-04 DIAGNOSIS — Z00.00 MEDICARE ANNUAL WELLNESS VISIT, SUBSEQUENT: ICD-10-CM

## 2023-10-04 DIAGNOSIS — E11.9 TYPE 2 DIABETES MELLITUS WITHOUT COMPLICATION, WITHOUT LONG-TERM CURRENT USE OF INSULIN (HCC): Primary | ICD-10-CM

## 2023-10-04 DIAGNOSIS — E66.01 OBESITY, MORBID (HCC): ICD-10-CM

## 2023-10-04 DIAGNOSIS — Z12.5 SCREENING FOR PROSTATE CANCER: ICD-10-CM

## 2023-10-04 DIAGNOSIS — I10 BENIGN ESSENTIAL HYPERTENSION: ICD-10-CM

## 2023-10-04 PROCEDURE — 99214 OFFICE O/P EST MOD 30 MIN: CPT | Performed by: FAMILY MEDICINE

## 2023-10-04 PROCEDURE — 90662 IIV NO PRSV INCREASED AG IM: CPT | Performed by: FAMILY MEDICINE

## 2023-10-04 PROCEDURE — G0439 PPPS, SUBSEQ VISIT: HCPCS | Performed by: FAMILY MEDICINE

## 2023-10-04 PROCEDURE — G0008 ADMIN INFLUENZA VIRUS VAC: HCPCS | Performed by: FAMILY MEDICINE

## 2023-10-04 RX ORDER — LISINOPRIL 20 MG/1
20 TABLET ORAL DAILY
Qty: 90 TABLET | Refills: 3 | Status: SHIPPED | OUTPATIENT
Start: 2023-10-04

## 2023-10-04 RX ORDER — ATORVASTATIN CALCIUM 10 MG/1
10 TABLET, FILM COATED ORAL DAILY
Qty: 90 TABLET | Refills: 3 | Status: SHIPPED | OUTPATIENT
Start: 2023-10-04

## 2023-10-04 NOTE — PROGRESS NOTES
Assessment and Plan:     Problem List Items Addressed This Visit        Endocrine    Diabetes mellitus, type 2 ([address]) - Primary    Relevant Orders    Hemoglobin A1C       Cardiovascular and Mediastinum    Benign essential hypertension    Relevant Medications    lisinopril (ZESTRIL) 20 mg tablet    Other Relevant Orders    Comprehensive metabolic panel    CBC and differential       Other    Mixed hyperlipidemia    Relevant Medications    atorvastatin (LIPITOR) 10 mg tablet    Other Relevant Orders    Lipid panel    Obesity, morbid ([address])   Other Visit Diagnoses     Medicare annual wellness visit, subsequent        Screening for prostate cancer        Relevant Orders    PSA, Total Screen    Encounter for immunization        Relevant Orders    influenza vaccine, high-dose, PF 0.7 mL (FLUZONE HIGH-DOSE) (Completed)        Continue with current medications. Office visit 6 months with repeat labs. Flu vaccine today. Follow-up      Depression Screening and Follow-up Plan: Patient was screened for depression during today's encounter. They screened negative with a PHQ-2 score of 0. Tobacco Cessation Counseling: Tobacco cessation counseling was provided. The patient is sincerely urged to quit consumption of tobacco. He is not ready to quit tobacco.       Preventive health issues were discussed with patient, and age appropriate screening tests were ordered as noted in patient's After Visit Summary. Personalized health advice and appropriate referrals for health education or preventive services given if needed, as noted in patient's After Visit Summary. History of Present Illness:     Patient presents for a Medicare Wellness Visit    Followup visit. Medications reviewed. Type 2 DM diet controlled. 09/2023 FBS 98. A1c 6.3. Urine albumin normal at 1.6. on ACE. No DPN. Current with eye exam  No history of retinopathy. Hypertension blood pressures have been stable on Lisinopril 10 mg daily. 09/2023 creatinine 0.93.  GFR 88. Electrolytes normal  Hgb 13.5. Hyperlipidemia mixed type on Atorvastatin 10 mg daily and 2 fish oils/day . Lipid profile 09/2023 cholesterol 118. Triglycerides 149 decreased from 229. HDL 34. LDL 54. LFTs normal.  09/2022 TSH 0.74       Patient Care Team:  Rodolfo Andrew MD as PCP - Obi Carey MD (General Surgery)  Monse Mattson MD (Colon and Rectal Surgery)     Review of Systems:     Review of Systems   Constitutional: Positive for unexpected weight change (5 lb weight gain from 07/2023 ). Negative for appetite change, chills, fatigue and fever. HENT: Negative for congestion, ear pain, hearing loss, rhinorrhea, sore throat and trouble swallowing. Eyes: Negative for visual disturbance. Respiratory: Positive for apnea. Negative for cough, shortness of breath and wheezing. OSMAN on CPAP. 06/2018 admission for bilateral pulmonary emboli. Venous Dopplers were not done in the hospital.  PE unprovoked with no history of recent travel. no hospitalizations or surgeries. No family history of DVT/PE. No personal history of DVT or pulmonary embolus. No cancer history. Colonoscopy 04/2013. Workup in the hospital chest x-ray small area of infiltrate/atelectasis left lung base and small left effusion. Probable minimal right pleural effusion. CT scan abdomen and pelvis without contrast similar mild to moderate left hydronephrosis. left renal pelvic calculi seen. Small bilateral effusions and bilateral dependent subsegmental atelectasis. Normal liver, spleen and pancreas. No lymphadenopathy. CT scan chest showed bilateral pulmonary emboli left greater than right in the lower lobes. Pleural effusions left greater than right. Basal atelectasis. Possibly small left lower lobe pulmonary infarct. No evidence of right cardiac pressure overload. Mild pericardial effusion. Echocardiogram normal left ventricular chamber size normal left ventricular systolic function.   No regional wall motion abnormalities. Mild concentric LV H. EF 60%. Moderate tricuspid regurgitation. Normal pulmonary artery systolic blood pressure. Mild diastolic dysfunction with normal filling pressures. Small pericardial effusion without evidence of hemodynamic compromise. EKG normal sinus rhythm no acute changes. Patient completed 6 months of Eliquis. 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 08/2019 D dimer < 0.27   Cardiovascular: Negative for chest pain, palpitations and leg swelling. Gastrointestinal: Negative for abdominal pain, blood in stool, constipation, diarrhea, nausea and vomiting. Fatty liver. 09/2023 LFTs normal. 10/2022  colorectal surgery OV for banding of hemorrhoids. Per patient colonoscopy 10/2022    Endocrine: Negative for polydipsia and polyuria. Genitourinary: Negative for difficulty urinating.        03/2023 PSA 0.63. 03/2022 PSA 0.84. 03/2021 PSA 0.75   Musculoskeletal: Negative for arthralgias and myalgias. Skin: Negative for rash. Allergic/Immunologic: Negative for environmental allergies. Neurological: Negative for dizziness and headaches. Hematological: Negative for adenopathy. Does not bruise/bleed easily. Psychiatric/Behavioral: Negative for dysphoric mood and sleep disturbance. The patient is not nervous/anxious.          Problem List:     Patient Active Problem List   Diagnosis   • Benign essential hypertension   • Diabetes mellitus, type 2 ([address])   • Mixed hyperlipidemia   • Obstructive sleep apnea   • ED (erectile dysfunction)   • Fatty liver   • Renal calculus   • Tobacco abuse   • Impotence of organic origin   • Obesity, morbid ([address])   • History of pulmonary embolus (PE)      Past Medical and Surgical History:     Past Medical History:   Diagnosis Date   • Acute pancreatitis     last assessed 7/20/17   • Candidal intertrigo     last assessed 7/20/17   • Cholelithiasis     last assessed 7/20/17   • Elevated liver enzymes     last assessed 8/29/16   • Gall stone pancreatitis 08/23/2016   • GERD (gastroesophageal reflux disease) 07/20/2016   • Hydronephrosis, left 08/25/2016   • Hypertension    • Impaired fasting glucose     last assessed 1/18/16   • Nephrolithiasis     last assessed 3/18/13   • Pancreatitis, acute 07/02/2017   • Pericardial effusion     last assessed 8/29/16   • Pulmonary emboli ([address]) 06/10/2018     Past Surgical History:   Procedure Laterality Date   • HEMORRHOID SURGERY      hemorrhoidectomy   • KIDNEY SURGERY Right    • LAPAROSCOPIC CHOLECYSTECTOMY      S/P,last assessed 7/20/17   • LITHOTRIPSY      renal      Family History:     Family History   Problem Relation Age of Onset   • Diabetes type II Father       Social History:     Social History     Socioeconomic History   • Marital status:      Spouse name: None   • Number of children: None   • Years of education: None   • Highest education level: None   Occupational History   • None   Tobacco Use   • Smoking status: Every Day     Packs/day: 0.50     Years: 35.00     Total pack years: 17.50     Types: Cigarettes   • Smokeless tobacco: Never   Substance and Sexual Activity   • Alcohol use: No   • Drug use: No   • Sexual activity: Yes     Partners: Female     Birth control/protection: None   Other Topics Concern   • None   Social History Narrative   • None     Social Determinants of Health     Financial Resource Strain: Low Risk  (9/29/2023)    Overall Financial Resource Strain (CARDIA)    • Difficulty of Paying Living Expenses: Not very hard   Food Insecurity: Not on file   Transportation Needs: No Transportation Needs (9/29/2023)    PRAPARE - Transportation    • Lack of Transportation (Medical): No    • Lack of Transportation (Non-Medical):  No   Physical Activity: Not on file   Stress: Not on file   Social Connections: Not on file   Intimate Partner Violence: Not on file   Housing Stability: Not on file      Medications and Allergies:     Current Outpatient Medications Medication Sig Dispense Refill   • aspirin (ECOTRIN LOW STRENGTH) 81 mg EC tablet Take 81 mg by mouth daily     • atorvastatin (LIPITOR) 10 mg tablet Take 1 tablet (10 mg total) by mouth daily 90 tablet 3   • lisinopril (ZESTRIL) 20 mg tablet Take 1 tablet (20 mg total) by mouth daily 90 tablet 3   • multivitamin (THERAGRAN) TABS Take 1 tablet by mouth     • Omega-3 Fatty Acids (FISH OIL PO) Take 1 g by mouth daily     • prednisoLONE acetate (PRED FORTE) 1 % ophthalmic suspension INSTILL 1 DROP INTO BOTH EYES TWICE A DAY     • Prolensa 0.07 % SOLN Administer 1 drop to both eyes in the morning       No current facility-administered medications for this visit. No Known Allergies   Immunizations:     Immunization History   Administered Date(s) Administered   • COVID-19 PFIZER VACCINE 0.3 ML IM 03/11/2021, 04/01/2021, 11/03/2021   • COVID-19 Pfizer vac (Lee-sucrose, gray cap) 12 yr+ IM 08/25/2022   • INFLUENZA 10/28/2019, 10/14/2021   • Influenza Split High Dose Preservative Free IM 10/28/2019   • Influenza, high dose seasonal 0.7 mL 10/28/2019, 09/02/2020, 09/26/2022, 10/04/2023   • Pneumococcal Conjugate 13-Valent 08/21/2019   • Pneumococcal Polysaccharide PPV23 09/02/2020   • Tdap 08/21/2011, 09/22/2021      Health Maintenance:         Topic Date Due   • Colorectal Cancer Screening  Never done   • Hepatitis C Screening  Completed     There are no preventive care reminders to display for this patient. Medicare Screening Tests and Risk Assessments:     Severa Raddle is here for his Subsequent Wellness visit. Last Medicare Wellness visit information reviewed, patient interviewed and updates made to the record today. Health Risk Assessment:   Patient rates overall health as very good. Patient feels that their physical health rating is same. Patient is satisfied with their life. Eyesight was rated as same. Hearing was rated as same. Patient feels that their emotional and mental health rating is same.  Patients states they are never, rarely angry. Patient states they are sometimes unusually tired/fatigued. Pain experienced in the last 7 days has been none. Patient states that he has experienced no weight loss or gain in last 6 months. Depression Screening:   PHQ-2 Score: 0      Fall Risk Screening: In the past year, patient has experienced: no history of falling in past year      Home Safety:  Patient does not have trouble with stairs inside or outside of their home. Patient has working smoke alarms and has working carbon monoxide detector. Home safety hazards include: none. Nutrition:   Current diet is Regular. Medications:   Patient is not currently taking any over-the-counter supplements. Patient is able to manage medications. Activities of Daily Living (ADLs)/Instrumental Activities of Daily Living (IADLs):   Walk and transfer into and out of bed and chair?: Yes  Dress and groom yourself?: Yes    Bathe or shower yourself?: Yes    Feed yourself?  Yes  Do your laundry/housekeeping?: Yes  Manage your money, pay your bills and track your expenses?: Yes  Make your own meals?: Yes    Do your own shopping?: Yes    Durable Medical Equipment Suppliers  [employer]    Previous Hospitalizations:   Any hospitalizations or ED visits within the last 12 months?: No      Advance Care Planning:   Living will: No    Durable POA for healthcare: No    Advanced directive: No      Cognitive Screening:   Provider or family/friend/caregiver concerned regarding cognition?: No    PREVENTIVE SCREENINGS      Cardiovascular Screening:    General: History Lipid Disorder and Screening Current      Diabetes Screening:     General: Screening Not Indicated and History Diabetes      Colorectal Cancer Screening:     General: Screening Current      Prostate Cancer Screening:    General: Screening Current      Osteoporosis Screening:    General: Screening Not Indicated      Abdominal Aortic Aneurysm (AAA) Screening:    Risk factors include: age between 70-75 yo and tobacco use        General: Patient Declines      Lung Cancer Screening:     General: Patient Declines      Hepatitis C Screening:    General: Screening Current    Screening, Brief Intervention, and Referral to Treatment (SBIRT)    Screening  Typical number of drinks in a day: 0  Typical number of drinks in a week: 0  Interpretation: Low risk drinking behavior. AUDIT-C Screenin) How often did you have a drink containing alcohol in the past year? never  2) How many drinks did you have on a typical day when you were drinking in the past year? 0  3) How often did you have 6 or more drinks on one occasion in the past year? never    AUDIT-C Score: 0  Interpretation: Score 0-3 (male): Negative screen for alcohol misuse    Single Item Drug Screening:  How often have you used an illegal drug (including marijuana) or a prescription medication for non-medical reasons in the past year? never    Single Item Drug Screen Score: 0  Interpretation: Negative screen for possible drug use disorder    Other Counseling Topics:   Calcium and vitamin D intake and regular weightbearing exercise. Physical Exam:     /68 (BP Location: Left arm, Patient Position: Sitting, Cuff Size: Large)   Pulse 72   Temp 97.5 °F (36.4 °C)   Ht 5' 9" (1.753 m)   Wt 111 kg (245 lb 8 oz)   SpO2 97%   BMI 36.25 kg/m²     BP Readings from Last 3 Encounters:   10/04/23 130/68   23 122/74   22 128/78     Wt Readings from Last 3 Encounters:   10/04/23 111 kg (245 lb 8 oz)   23 109 kg (240 lb)   23 111 kg (245 lb 12.8 oz)         Physical Exam  Constitutional:       General: He is not in acute distress. HENT:      Right Ear: Tympanic membrane and ear canal normal.      Left Ear: Tympanic membrane and ear canal normal.      Mouth/Throat:      Mouth: No oral lesions. Dentition: Has dentures. Pharynx: Oropharynx is clear. Eyes:      General: No scleral icterus.      Extraocular Movements: Extraocular movements intact. Conjunctiva/sclera: Conjunctivae normal.      Pupils: Pupils are equal, round, and reactive to light. Neck:      Vascular: No carotid bruit. Cardiovascular:      Rate and Rhythm: Normal rate and regular rhythm. Heart sounds: No murmur heard. No gallop. Pulmonary:      Effort: Pulmonary effort is normal.      Breath sounds: Normal breath sounds. No wheezing or rales. Abdominal:      General: Bowel sounds are normal. There is no distension. Palpations: Abdomen is soft. There is no hepatomegaly, splenomegaly or mass. Tenderness: There is no abdominal tenderness. There is no guarding or rebound. Hernia: A hernia is present. Hernia is present in the umbilical area. Comments: Small nontender partially reducible umbilical hernia   Musculoskeletal:      Right lower leg: No edema. Left lower leg: No edema. Lymphadenopathy:      Cervical: No cervical adenopathy. Skin:     Findings: No rash. Neurological:      General: No focal deficit present. Mental Status: He is alert and oriented to person, place, and time.    Psychiatric:         Mood and Affect: Mood normal.         Behavior: Behavior normal.          Tru Cedeno MD

## 2023-10-13 ENCOUNTER — TELEPHONE (OUTPATIENT)
Dept: ADMINISTRATIVE | Facility: OTHER | Age: 68
End: 2023-10-13

## 2023-10-13 NOTE — TELEPHONE ENCOUNTER
----- Message from [facility] sent at 10/12/2023 11:53 AM EDT -----  10/12/23 11:53 AM    Hello, our patient Laurel Marks has had CRC: Colonoscopy completed/performed. Please assist in updating the patient chart by pulling the document from the Media Tab scanned on 10/3/2022. The date of service is 10/1/2022.      Thank you,  Betty VOGT

## 2023-10-13 NOTE — LETTER
Procedure Request Form: Colonoscopy      Date Requested: 10/23/23  Patient: Gonzalo House  Patient : 1955   Referring Provider: Tru Cedeno MD        Date of Procedure ______________________________       The above patient has informed us that they have completed their   most recent Colonoscopy at your facility. Please complete   this form and attach all corresponding procedure reports/results. Comments __________________________________________________________  ____________________________________________________________________  ____________________________________________________________________  ____________________________________________________________________    Facility Completing Procedure _________________________________________    Form Completed By (print name) _______________________________________      Signature __________________________________________________________      These reports are needed for  compliance. Please fax this completed form and a copy of the procedure report to our office located at [address] [place] [address] as soon as possible to Fax [phone] attention Halima Petty: Phone [phone]    We thank you for your assistance in treating our mutual patient.

## 2023-10-13 NOTE — LETTER
Procedure Request Form: Colonoscopy      Date Requested: 10/18/23  Patient: Hussein French  Patient : 1955   Referring Provider: Loreto Davila, MD        Date of Procedure ______________________________       The above patient has informed us that they have completed their   most recent Colonoscopy at your facility. Please complete   this form and attach all corresponding procedure reports/results. Comments __________________________________________________________  ____________________________________________________________________  ____________________________________________________________________  ____________________________________________________________________    Facility Completing Procedure _________________________________________    Form Completed By (print name) _______________________________________      Signature __________________________________________________________      These reports are needed for  compliance. Please fax this completed form and a copy of the procedure report to our office located at [address] [place] [address] as soon as possible to Fax [phone] attention Juana Maddox: Phone [phone]    We thank you for your assistance in treating our mutual patient.

## 2023-10-18 NOTE — TELEPHONE ENCOUNTER
Upon review of the In Basket request and the patient's chart, initial outreach has been made via fax to facility. Please see Contacts section for details.      Thank you  Franky Escamilla MA

## 2023-10-18 NOTE — TELEPHONE ENCOUNTER
Upon review of the In Basket request and the patient's chart, initial outreach has been made via fax to facility. Please see Contacts section for details.      Thank you  Lisa Marie MA

## 2023-10-23 NOTE — TELEPHONE ENCOUNTER
As a follow-up, a second attempt has been made for outreach via fax to facility. Please see Contacts section for details.     Thank you  Pito Coles MA

## 2023-10-30 NOTE — TELEPHONE ENCOUNTER
Upon review of the In Basket request we were able to locate, review, and update the patient chart as requested for CRC: Colonoscopy. Any additional questions or concerns should be emailed to the Practice Liaisons via the appropriate education email address, please do not reply via In Basket.     Thank you  Destiny Orellana MA

## 2023-11-13 ENCOUNTER — OFFICE VISIT (OUTPATIENT)
Age: 68
End: 2023-11-13
Payer: COMMERCIAL

## 2023-11-13 VITALS — HEIGHT: 69 IN | WEIGHT: 244 LBS | TEMPERATURE: 97.7 F | BODY MASS INDEX: 36.14 KG/M2

## 2023-11-13 DIAGNOSIS — D48.5 NEOPLASM OF UNCERTAIN BEHAVIOR OF SKIN: ICD-10-CM

## 2023-11-13 DIAGNOSIS — L72.0 EPIDERMAL INCLUSION CYST: Primary | ICD-10-CM

## 2023-11-13 PROCEDURE — 88305 TISSUE EXAM BY PATHOLOGIST: CPT | Performed by: STUDENT IN AN ORGANIZED HEALTH CARE EDUCATION/TRAINING PROGRAM

## 2023-11-13 PROCEDURE — 99214 OFFICE O/P EST MOD 30 MIN: CPT | Performed by: DERMATOLOGY

## 2023-11-13 PROCEDURE — 88342 IMHCHEM/IMCYTCHM 1ST ANTB: CPT | Performed by: STUDENT IN AN ORGANIZED HEALTH CARE EDUCATION/TRAINING PROGRAM

## 2023-11-13 PROCEDURE — 11102 TANGNTL BX SKIN SINGLE LES: CPT | Performed by: DERMATOLOGY

## 2023-11-13 PROCEDURE — 11103 TANGNTL BX SKIN EA SEP/ADDL: CPT | Performed by: DERMATOLOGY

## 2023-11-13 RX ORDER — DOXYCYCLINE 100 MG/1
100 TABLET ORAL 2 TIMES DAILY
Qty: 42 TABLET | Refills: 0 | Status: SHIPPED | OUTPATIENT
Start: 2023-11-13 | End: 2023-12-04

## 2023-11-13 NOTE — PATIENT INSTRUCTIONS
III. POST-PROCEDURAL CARE (WHAT YOU WILL NEED TO DO "AFTER THE BIOPSY" TO OPTIMIZE HEALING)    Keep the area clean and dry. Try NOT to remove the bandage or get it wet for the first 24 hours. Gently clean the area and apply surgical ointment (such as Vaseline petrolatum ointment, which is available "over the counter" and not a prescription) to the biopsy site for up to 2 weeks straight. This acts to protect the wound from the outside world. Sutures are not usually placed in this procedure. If any sutures were placed, return for suture removal as instructed (generally 1 week for the face, 2 weeks for the body). Take Acetaminophen (Tylenol) for discomfort, if no contraindications. Ibuprofen or aspirin could make bleeding worse. Call our office immediately for signs of infection: fever, chills, increased redness, warmth, tenderness, discomfort/pain, or pus or foul smell coming from the wound. WHAT TO DO IF THERE IS ANY BLEEDING? If a small amount of bleeding is noticed, place a clean cloth over the area and apply firm pressure for ten minutes. Check the wound after 10 minutes of direct pressure. If bleeding persists, try one more time for an additional 10 minutes of direct pressure on the area. If the bleeding becomes heavier or does not stop after the second attempt, or if you have any other questions about this procedure, then please call your [facility] Dermatologist by calling [phone] (SKIN). I hereby acknowledge that I have reviewed and verified the site with my Dermatologist and have requested and authorized my Dermatologist to proceed with the procedure.       EPIDERMAL INCLUSION CYST     Assessment and Plan:  Based on a thorough discussion of this condition and the management approach to it (including a comprehensive discussion of the known risks, side effects and potential benefits of treatment), the patient (family) agrees to implement the following specific plan:  Start Doxycycline 100 mg by mouth twice a day after meal and full glass of water   Continue Warm compresses

## 2023-11-13 NOTE — PROGRESS NOTES
[facility] Note     Patient Name: Delphine Jauregui  Encounter Date: 11/13/2023     Have you been cared for by a [facility] in the last 3 years and, if so, which description applies to you? Yes. I have been here within the last 3 years, and my medical history has NOT changed since that time. I am MALE/not capable of bearing children. REVIEW OF SYSTEMS:  Have you recently had or currently have any of the following? No changes in my recent health. PAST MEDICAL HISTORY:  Have you personally ever had or currently have any of the following? If "YES," then please provide more detail. No changes in my medical history. HISTORY OF IMMUNOSUPPRESSION: Do you have a history of any of the following:  Systemic Immunosuppression such as Diabetes, Biologic or Immunotherapy, Chemotherapy, Organ Transplantation, Bone Marrow Transplantation? No     Answering "YES" requires the addition of the dotphrase "IMMUNOSUPPRESSED" as the first diagnosis of the patient's visit. FAMILY HISTORY:  Any "first degree relatives" (parent, brother, sister, or child) with the following? No changes in my family's known health. PATIENT EXPERIENCE:    Do you want the Dermatologist to perform a COMPLETE skin exam today including a clinical examination under the "bra and underwear" areas? NO  If necessary, do we have your permission to call and leave a detailed message on your Preferred Phone number that includes your specific medical information?   Yes      No Known Allergies   Current Outpatient Medications:   •  doxycycline (ADOXA) 100 MG tablet, Take 1 tablet (100 mg total) by mouth 2 (two) times a day for 21 days, Disp: 42 tablet, Rfl: 0  •  aspirin (ECOTRIN LOW STRENGTH) 81 mg EC tablet, Take 81 mg by mouth daily, Disp: , Rfl:   •  atorvastatin (LIPITOR) 10 mg tablet, Take 1 tablet (10 mg total) by mouth daily, Disp: 90 tablet, Rfl: 3  •  lisinopril (ZESTRIL) 20 mg tablet, Take 1 tablet (20 mg total) by mouth daily, Disp: 90 tablet, Rfl: 3  •  multivitamin (THERAGRAN) TABS, Take 1 tablet by mouth, Disp: , Rfl:   •  Omega-3 Fatty Acids (FISH OIL PO), Take 1 g by mouth daily, Disp: , Rfl:   •  prednisoLONE acetate (PRED FORTE) 1 % ophthalmic suspension, INSTILL 1 DROP INTO BOTH EYES TWICE A DAY, Disp: , Rfl:   •  Prolensa 0.07 % SOLN, Administer 1 drop to both eyes in the morning, Disp: , Rfl:           Whom besides the patient is providing clinical information about today's encounter? Spouse     Physical Exam and Assessment/Plan by Diagnosis:    EPIDERMAL INCLUSION CYST   Physical Exam:  Anatomic Location Affected:  left cheek   Morphological Description:  erythema and edema   Pertinent Positives:  Pertinent Negatives: Additional History of Present Condition:  He states it is not present at this time. He states the area drained     Assessment and Plan:  Based on a thorough discussion of this condition and the management approach to it (including a comprehensive discussion of the known risks, side effects and potential benefits of treatment), the patient (family) agrees to implement the following specific plan:  Start Doxycycline 100 mg by mouth twice a day after meal and full glass of water     NEOPLASM OF UNCERTAIN BEHAVIOR OF SKIN    Physical Exam:  (Anatomic Location); (Size and Morphological Description); (Differential Diagnosis):  Specimen A: right medial abdomen; 1-2 cm warty papule; (diffdx) inflamed sebk rule out scc   Specimen B: left abdomen; 1-2 cm warty papule; (diffdx) inflamed sebk rule out scc   Pertinent Positives:  Pertinent Negatives: Additional History of Present Condition:  growing and irritated     Assessment and Plan:  I have discussed with the patient that a sample of skin via a "skin biopsy” would be potentially helpful to further make a specific diagnosis under the microscope.   Based on a thorough discussion of this condition and the management approach to it (including a comprehensive discussion of the known risks, side effects and potential benefits of treatment), the patient (family) agrees to implement the following specific plan:    Procedure:  Skin Biopsy. After a thorough discussion of treatment options and risk/benefits/alternatives (including but not limited to local pain, scarring, dyspigmentation, blistering, possible superinfection, and inability to confirm a diagnosis via histopathology), verbal and written consent were obtained and portion of the rash was biopsied for tissue sample. See below for consent that was obtained from patient and subsequent Procedure Note. PROCEDURE TANGENTIAL (SHAVE) BIOPSY NOTE:    Performing Physician:   Anatomic Location; Clinical Description with size (cm); Pre-Op Diagnosis:   Specimen A: right medial abdomen; 1-2 cm warty papule; (diffdx) inflamed sebk rule out scc   Post-op diagnosis: Same     Local anesthesia: 1% xylocaine with epi      Topical anesthesia: None    Hemostasis: Electrocautery       Performing Physician:   Anatomic Location; Clinical Description with size (cm); Pre-Op Diagnosis:   Specimen B: left abdomen; 1-2 cm warty papule; (diffdx) inflamed sebk rule out scc   Post-op diagnosis: Same     Local anesthesia: 1% xylocaine with epi      Topical anesthesia: None    Hemostasis: Electrocautery   After obtaining informed consent  at which time there was a discussion about the purpose of biopsy  and low risks of infection and bleeding. The area was prepped and draped in the usual fashion. Anesthesia was obtained with 1% lidocaine with epinephrine. A shave biopsy to an appropriate sampling depth was obtained by Shave (Dermablade or 15 blade) The resulting wound was covered with surgical ointment and bandaged appropriately. The patient tolerated the procedure well without complications and was without signs of functional compromise.       Specimen has been sent for review by Dermatopathology. Standard post-procedure care has been explained and has been included in written form within the patient's copy of Informed Consent. INFORMED CONSENT DISCUSSION AND POST-OPERATIVE INSTRUCTIONS FOR PATIENT    I.  RATIONALE FOR PROCEDURE  I understand that a skin biopsy allows the Dermatologist to test a lesion or rash under the microscope to obtain a diagnosis. It usually involves numbing the area with numbing medication and removing a small piece of skin; sometimes the area will be closed with sutures. In this specific procedure, sutures are not usually needed. If any sutures are placed, then they are usually need to be removed in 2 weeks or less. I understand that my Dermatologist recommends that a skin "shave" biopsy be performed today. A local anesthetic, similar to the kind that a dentist uses when filling a cavity, will be injected with a very small needle into the skin area to be sampled. The injected skin and tissue underneath "will go to sleep” and become numb so no pain should be felt afterwards. An instrument shaped like a tiny "razor blade" (shave biopsy instrument) will be used to cut a small piece of tissue and skin from the area so that a sample of tissue can be taken and examined more closely under the microscope. A slight amount of bleeding will occur, but it will be stopped with direct pressure and a pressure bandage and any other appropriate methods. I understands that a scar will form where the wound was created. Surgical ointment will be applied to help protect the wound. Sutures are not usually needed.     II.  RISKS AND POTENTIAL COMPLICATIONS   I understand the risks and potential complications of a skin biopsy include but are not limited to the following:  Bleeding  Infection  Pain  Scar/keloid  Skin discoloration  Incomplete Removal  Recurrence  Nerve Damage/Numbness/Loss of Function  Allergic Reaction to Anesthesia  Biopsies are diagnostic procedures and based on findings additional treatment or evaluation may be required  Loss or destruction of specimen resulting in no additional findings    My Dermatologist has explained to me the nature of the condition, the nature of the procedure, and the benefits to be reasonably expected compared with alternative approaches. My Dermatologist has discussed the likelihood of major risks or complications of this procedure including the specific risks listed above, such as bleeding, infection, and scarring/keloid. I understand that a scar is expected after this procedure. I understand that my physician cannot predict if the scar will form a "keloid," which extends beyond the borders of the wound that is created. A keloid is a thick, painful, and bumpy scar. A keloid can be difficult to treat, as it does not always respond well to therapy, which includes injecting cortisone directly into the keloid every few weeks. While this usually reduces the pain and size of the scar, it does not eliminate it. I understand that photographs may be taken before and after the procedure. These will be maintained as part of the medical providers confidential records and may not be made available to me. I further authorize the medical provider to use the photographs for teaching purposes or to illustrate scientific papers, books, or lectures if in his/her judgment, medical research, education, or science may benefit from its use. I have had an opportunity to fully inquire about the risks and benefits of this procedure and its alternatives. I have been given ample time and opportunity to ask questions and to seek a second opinion if I wished to do so. I acknowledge that there have specifically been no guarantees as to the cosmetic results from the procedure. I am aware that with any procedure there is always the possibility of an unexpected complication. III.  POST-PROCEDURAL CARE (WHAT YOU WILL NEED TO DO "AFTER THE BIOPSY" TO OPTIMIZE HEALING)    Keep the area clean and dry. Try NOT to remove the bandage or get it wet for the first 24 hours. Gently clean the area and apply surgical ointment (such as Vaseline petrolatum ointment, which is available "over the counter" and not a prescription) to the biopsy site for up to 2 weeks straight. This acts to protect the wound from the outside world. Sutures are not usually placed in this procedure. If any sutures were placed, return for suture removal as instructed (generally 1 week for the face, 2 weeks for the body). Take Acetaminophen (Tylenol) for discomfort, if no contraindications. Ibuprofen or aspirin could make bleeding worse. Call our office immediately for signs of infection: fever, chills, increased redness, warmth, tenderness, discomfort/pain, or pus or foul smell coming from the wound. WHAT TO DO IF THERE IS ANY BLEEDING? If a small amount of bleeding is noticed, place a clean cloth over the area and apply firm pressure for ten minutes. Check the wound after 10 minutes of direct pressure. If bleeding persists, try one more time for an additional 10 minutes of direct pressure on the area. If the bleeding becomes heavier or does not stop after the second attempt, or if you have any other questions about this procedure, then please call your [facility]'s Dermatologist by calling [phone] (SKIN). I hereby acknowledge that I have reviewed and verified the site with my Dermatologist and have requested and authorized my Dermatologist to proceed with the procedure.          Scribe Attestation    I,:  Gretchen Garcia am acting as a scribe while in the presence of the attending physician.:       I,:  Adele Matias MD personally performed the services described in this documentation    as scribed in my presence.:

## 2023-11-16 PROCEDURE — 88305 TISSUE EXAM BY PATHOLOGIST: CPT | Performed by: STUDENT IN AN ORGANIZED HEALTH CARE EDUCATION/TRAINING PROGRAM

## 2023-11-16 PROCEDURE — 88342 IMHCHEM/IMCYTCHM 1ST ANTB: CPT | Performed by: STUDENT IN AN ORGANIZED HEALTH CARE EDUCATION/TRAINING PROGRAM

## 2023-12-05 ENCOUNTER — TELEPHONE (OUTPATIENT)
Age: 68
End: 2023-12-05

## 2023-12-05 NOTE — TELEPHONE ENCOUNTER
Pt significant other called, they received a bill for 11/13 appt but they did pay for it and the receipt was emailed     Advised to call Billing Dept and gave their phone number

## 2023-12-07 ENCOUNTER — TELEPHONE (OUTPATIENT)
Dept: DERMATOLOGY | Facility: CLINIC | Age: 68
End: 2023-12-07

## 2024-02-12 ENCOUNTER — 6 MONTH FOLLOW UP (OUTPATIENT)
Dept: URBAN - METROPOLITAN AREA CLINIC 6 | Facility: CLINIC | Age: 69
End: 2024-02-12

## 2024-02-12 DIAGNOSIS — H02.834: ICD-10-CM

## 2024-02-12 DIAGNOSIS — H16.123: ICD-10-CM

## 2024-02-12 DIAGNOSIS — H04.123: ICD-10-CM

## 2024-02-12 DIAGNOSIS — H02.831: ICD-10-CM

## 2024-02-12 DIAGNOSIS — H25.813: ICD-10-CM

## 2024-02-12 PROCEDURE — 92014 COMPRE OPH EXAM EST PT 1/>: CPT

## 2024-02-12 ASSESSMENT — VISUAL ACUITY
OS_PH: 20/30
OS_CC: 20/40-2
OD_CC: 20/25

## 2024-02-12 ASSESSMENT — TONOMETRY
OD_IOP_MMHG: 9
OS_IOP_MMHG: 11

## 2024-04-15 LAB
ALBUMIN SERPL-MCNC: 4 G/DL (ref 3.5–5.7)
ALP SERPL-CCNC: 69 U/L (ref 35–120)
ALT SERPL-CCNC: 18 U/L
ANION GAP SERPL CALCULATED.3IONS-SCNC: 11 MMOL/L (ref 3–11)
AST SERPL-CCNC: 15 U/L
BASOPHILS # BLD AUTO: 0 THOU/CMM (ref 0–0.1)
BASOPHILS NFR BLD AUTO: 1 %
BILIRUB SERPL-MCNC: 0.6 MG/DL (ref 0.2–1)
BUN SERPL-MCNC: 20 MG/DL (ref 7–28)
CALCIUM SERPL-MCNC: 9.4 MG/DL (ref 8.5–10.1)
CHLORIDE SERPL-SCNC: 103 MMOL/L (ref 100–109)
CHOLEST SERPL-MCNC: 121 MG/DL
CHOLEST/HDLC SERPL: 3.8 {RATIO}
CO2 SERPL-SCNC: 23 MMOL/L (ref 21–31)
CREAT SERPL-MCNC: 0.93 MG/DL (ref 0.53–1.3)
CYTOLOGY CMNT CVX/VAG CYTO-IMP: ABNORMAL
DIFFERENTIAL METHOD BLD: NORMAL
EOSINOPHIL # BLD AUTO: 0.1 THOU/CMM (ref 0–0.5)
EOSINOPHIL NFR BLD AUTO: 2 %
ERYTHROCYTE [DISTWIDTH] IN BLOOD BY AUTOMATED COUNT: 14.8 % (ref 12–16)
EST. AVERAGE GLUCOSE BLD GHB EST-MCNC: 137 MG/DL
GFR/BSA.PRED SERPLBLD CYS-BASED-ARV: 88 ML/MIN/{1.73_M2}
GLUCOSE SERPL-MCNC: 108 MG/DL (ref 65–99)
HBA1C MFR BLD: 6.4 %
HCT VFR BLD AUTO: 37.1 % (ref 37–48)
HDLC SERPL-MCNC: 32 MG/DL (ref 23–92)
HGB BLD-MCNC: 12.8 G/DL (ref 12.5–17)
LDLC SERPL CALC-MCNC: 63 MG/DL
LYMPHOCYTES # BLD AUTO: 2 THOU/CMM (ref 1–3)
LYMPHOCYTES NFR BLD AUTO: 27 %
MCH RBC QN AUTO: 31.8 PG (ref 27–36)
MCHC RBC AUTO-ENTMCNC: 34.6 G/DL (ref 32–37)
MCV RBC AUTO: 92 FL (ref 80–100)
MONOCYTES # BLD AUTO: 0.6 THOU/CMM (ref 0.3–1)
MONOCYTES NFR BLD AUTO: 8 %
NEUTROPHILS # BLD AUTO: 4.6 THOU/CMM (ref 1.8–7.8)
NEUTROPHILS NFR BLD AUTO: 62 %
NONHDLC SERPL-MCNC: 89 MG/DL
PLATELET # BLD AUTO: 262 THOU/CMM (ref 140–350)
PMV BLD REES-ECKER: 7.7 FL (ref 7.5–11.3)
POTASSIUM SERPL-SCNC: 4.7 MMOL/L (ref 3.5–5.2)
PROT SERPL-MCNC: 6.6 G/DL (ref 6.3–8.3)
PSA SERPL-MCNC: 1.08 NG/ML
RBC # BLD AUTO: 4.04 MILL/CMM (ref 4–5.4)
SODIUM SERPL-SCNC: 137 MMOL/L (ref 135–145)
TRIGL SERPL-MCNC: 129 MG/DL
WBC # BLD AUTO: 7.3 THOU/CMM (ref 4–10.5)

## 2024-04-29 NOTE — PROGRESS NOTES
Name: Ramiro Haro      : 1955      MRN: [id number]  Encounter Provider: Linwood Harvey MD  Encounter Date: 2024   Encounter department: [facility]    Assessment & Plan     1. Type 2 diabetes mellitus without complication, without long-term current use of insulin (HCC)  -     Hemoglobin A1C  -     Albumin / creatinine urine ratio; Future    2. Benign essential hypertension  -     CBC and differential  -     Comprehensive metabolic panel    3. Mixed hyperlipidemia  -     Lipid panel    4. Fatty liver    5. Obesity, morbid (HCC)    Continue with current medications.  Office visit 6 months with labs.    BMI Counseling: Body mass index is 37.07 kg/m². The BMI is above normal. Nutrition recommendations include consuming healthier snacks, moderation in carbohydrate intake, reducing intake of saturated fat and trans fat, and reducing intake of cholesterol.        Subjective     Followup visit for chronic medical problems. Medications reviewed.     Type 2 DM diet controlled. 2024 . A1c 6.4. 2023 Urine albumin normal at 1.6. on ACE. No DPN. Current with eye exam  No history of retinopathy.      Hypertension blood pressures have been stable on Lisinopril 10 mg daily. 2024 creatinine 0.93. GFR 88. Electrolytes normal  Hgb 12.8.      Hyperlipidemia mixed type on Atorvastatin 10 mg daily and 2 fish oils/day . Lipid profile 2024 cholesterol 121. Triglycerides 129.  HDL 32. LDL 63. LFTs normal.  2022 TSH 0.74        Recent Results (from the past 672 hour(s))   CBC and differential    Collection Time: 04/15/24 10:39 AM   Result Value Ref Range    Hemoglobin 12.8 12.5 - 17.0 g/dL    HCT 37.1 37.0 - 48.0 %    White Blood Cell Count 7.3 4.0 - 10.5 thou/cmm    Red Blood Cell Count 4.04 4.00 - 5.40 mill/cmm    Platelet Count 262 140 - 350 thou/cmm    SL AMB MPV 7.7 7.5 - 11.3 fL    MCV 92 80 - 100 fL    MCH 31.8 27.0 - 36.0 pg    MCHC 34.6 32.0 - 37.0 g/dL    RDW 14.8 12.0 - 16.0 %     Differential Type AUTO     Neutrophils (Absolute) 4.6 1.8 - 7.8 thou/cmm    Lymphocytes (Absolute) 2.0 1.0 - 3.0 thou/cmm    Monocytes (Absolute) 0.6 0.3 - 1.0 thou/cmm    Eosinophils (Absolute) 0.1 0.0 - 0.5 thou/cmm    Basophils ABS 0.0 0.0 - 0.1 thou/cmm    Neutrophils 62 %    Lymphocytes 27 %    Monocytes 8 %    Eosinophils 2 %    Basophils PCT 1 %   Comprehensive metabolic panel    Collection Time: 04/15/24 10:39 AM   Result Value Ref Range    Glucose, Random 108 (H) 65 - 99 mg/dL    BUN 20 7 - 28 mg/dL    Creatinine 0.93 0.53 - 1.30 mg/dL    Sodium 137 135 - 145 mmol/L    Potassium 4.7 3.5 - 5.2 mmol/L    Chloride 103 100 - 109 mmol/L    CO2 23 21 - 31 mmol/L    Calcium 9.4 8.5 - 10.1 mg/dL    Alkaline Phosphatase 69 35 - 120 U/L    Albumin 4.0 3.5 - 5.7 g/dL    TOTAL BILIRUBIN 0.6 0.2 - 1.0 mg/dL    Protein, Total 6.6 6.3 - 8.3 g/dL    AST 15 <41 U/L    ALT 18 <56 U/L    ANION GAP 11 3 - 11    eGFR 88 >59    Comment (Note)    Lipid panel    Collection Time: 04/15/24 10:39 AM   Result Value Ref Range    Cholesterol, Total 121 <200 mg/dL    Triglycerides 129 <150 mg/dL    HDL Cholesterol 32 23 - 92 mg/dL    Non HDL Chol. (LDL+VLDL) 89 <160 mg/dL    LDL Calculated 63 <130 mg/dL    Chol HDLC Ratio 3.8    PSA, Total Screen    Collection Time: 04/15/24 10:39 AM   Result Value Ref Range    Prostate Specific Antigen Total 1.08 <4.00 ng/mL   Hemoglobin A1C    Collection Time: 04/15/24 10:39 AM   Result Value Ref Range    Hemoglobin A1C 6.4 (H) <5.7 %    Estimated Average Glucose 137 mg/dL         Review of Systems   Constitutional:  Negative for appetite change, chills, fatigue, fever and unexpected weight change.   HENT:  Negative for congestion, ear pain, hearing loss, rhinorrhea, sore throat and trouble swallowing.    Eyes:  Negative for visual disturbance.   Respiratory:  Positive for apnea. Negative for cough, shortness of breath and wheezing.         OSMAN on CPAP.     06/2018 admission for bilateral pulmonary  emboli.   Venous Dopplers were not done in the hospital.  PE unprovoked with no history of recent travel. no hospitalizations or surgeries.  No family history of DVT/PE.  No personal history of DVT or pulmonary embolus.  No cancer history.   Workup in the hospital chest x-ray small area of infiltrate/atelectasis left lung base and small left effusion.  Probable minimal right pleural effusion.  CT scan abdomen and pelvis without contrast similar mild to moderate left hydronephrosis.  left renal pelvic calculi seen.  Small bilateral effusions and bilateral dependent subsegmental atelectasis. Normal liver, spleen and pancreas. No lymphadenopathy.   CT scan chest showed bilateral pulmonary emboli left greater than right in the lower lobes.  Pleural effusions left greater than right.  Basal atelectasis.  Possibly small left lower lobe pulmonary infarct.  No evidence of right cardiac pressure overload.  Mild pericardial effusion.  Echocardiogram normal left ventricular chamber size normal left ventricular systolic function.  No regional wall motion abnormalities.  Mild concentric LV H. EF 60%.  Moderate tricuspid regurgitation.  Normal pulmonary artery systolic blood pressure.  Mild diastolic dysfunction with normal filling pressures.  Small pericardial effusion without evidence of hemodynamic compromise.  EKG normal sinus rhythm no acute changes. Patient completed 6 months of Eliquis. 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 08/2019 D dimer < 0.27   Cardiovascular:  Negative for chest pain, palpitations and leg swelling.   Gastrointestinal:  Negative for abdominal pain, blood in stool, constipation, diarrhea, nausea and vomiting.        Fatty liver. 04/2024 LFTs normal. Albumin 4.0. FIB 4 score-0.94.  10/2022  colorectal surgery OV for banding of hemorrhoids. Colonoscopy 11/2022    Endocrine: Negative for polydipsia and polyuria.   Genitourinary:  Negative for difficulty urinating.        03/2023  PSA 0.63. 03/2022 PSA 0.84. 03/2021 PSA 0.75    Lab Results       Component                Value               Date                       PSA                      1.08                04/15/2024               Musculoskeletal:  Negative for arthralgias and myalgias.   Skin:  Negative for rash.   Allergic/Immunologic: Negative for environmental allergies.   Neurological:  Negative for dizziness and headaches.   Hematological:  Negative for adenopathy. Does not bruise/bleed easily.   Psychiatric/Behavioral:  Negative for dysphoric mood and sleep disturbance. The patient is not nervous/anxious.        Past Medical History:   Diagnosis Date    Acute pancreatitis     last assessed 7/20/17    Candidal intertrigo     last assessed 7/20/17    Cholelithiasis     last assessed 7/20/17    Elevated liver enzymes     last assessed 8/29/16    Gall stone pancreatitis 08/23/2016    GERD (gastroesophageal reflux disease) 07/20/2016    Hydronephrosis, left 08/25/2016    Hypertension     Impaired fasting glucose     last assessed 1/18/16    Nephrolithiasis     last assessed 3/18/13    Obesity     Pancreatitis, acute 07/02/2017    Pericardial effusion     last assessed 8/29/16    Pulmonary emboli (HCC) 06/10/2018     Past Surgical History:   Procedure Laterality Date    HEMORRHOID SURGERY      hemorrhoidectomy    KIDNEY SURGERY Right     LAPAROSCOPIC CHOLECYSTECTOMY      S/P,last assessed 7/20/17    LITHOTRIPSY      renal     Family History   Problem Relation Age of Onset    Diabetes type II Father      Social History     Socioeconomic History    Marital status:      Spouse name: None    Number of children: None    Years of education: None    Highest education level: None   Occupational History    None   Tobacco Use    Smoking status: Every Day     Current packs/day: 0.50     Average packs/day: 0.5 packs/day for 35.0 years (17.5 ttl pk-yrs)     Types: Cigarettes    Smokeless tobacco: Never   Vaping Use    Vaping status: Never Used    Substance and Sexual Activity    Alcohol use: No    Drug use: No    Sexual activity: Yes     Partners: Female     Birth control/protection: None   Other Topics Concern    None   Social History Narrative    None     Social Determinants of Health     Financial Resource Strain: Low Risk  (9/29/2023)    Overall Financial Resource Strain (CARDIA)     Difficulty of Paying Living Expenses: Not very hard   Food Insecurity: Not on file   Transportation Needs: No Transportation Needs (9/29/2023)    PRAPARE - Transportation     Lack of Transportation (Medical): No     Lack of Transportation (Non-Medical): No   Physical Activity: Not on file   Stress: Not on file   Social Connections: Not on file   Intimate Partner Violence: Not on file   Housing Stability: Not on file     Current Outpatient Medications on File Prior to Visit   Medication Sig    aspirin (ECOTRIN LOW STRENGTH) 81 mg EC tablet Take 81 mg by mouth daily    atorvastatin (LIPITOR) 10 mg tablet Take 1 tablet (10 mg total) by mouth daily    lisinopril (ZESTRIL) 20 mg tablet Take 1 tablet (20 mg total) by mouth daily    multivitamin (THERAGRAN) TABS Take 1 tablet by mouth    Omega-3 Fatty Acids (FISH OIL PO) Take 1 g by mouth daily    prednisoLONE acetate (PRED FORTE) 1 % ophthalmic suspension INSTILL 1 DROP INTO BOTH EYES TWICE A DAY    Prolensa 0.07 % SOLN Administer 1 drop to both eyes in the morning     No Known Allergies  Immunization History   Administered Date(s) Administered    COVID-19 PFIZER VACCINE 0.3 ML IM 03/11/2021, 04/01/2021, 11/03/2021    COVID-19 Pfizer vac (Lee-sucrose, gray cap) 12 yr+ IM 08/25/2022    COVID-19, unspecified 10/05/2023    INFLUENZA 10/28/2019, 10/14/2021    Influenza Split High Dose Preservative Free IM 10/28/2019    Influenza, high dose seasonal 0.7 mL 10/28/2019, 09/02/2020, 09/26/2022, 10/04/2023    Pneumococcal Conjugate 13-Valent 08/21/2019    Pneumococcal Polysaccharide PPV23 09/02/2020    Tdap 08/21/2011, 09/22/2021  "      Objective     /66 (BP Location: Left arm, Patient Position: Sitting, Cuff Size: Large)   Pulse 72   Temp 97.7 °F (36.5 °C) (Temporal)   Resp 16   Ht 5' 9\" (1.753 m)   Wt 114 kg (251 lb)   SpO2 97%   BMI 37.07 kg/m²     Physical Exam  Vitals and nursing note reviewed.   Constitutional:       General: He is not in acute distress.     Appearance: He is well-developed.   HENT:      Right Ear: Tympanic membrane normal.      Left Ear: Tympanic membrane normal.      Mouth/Throat:      Mouth: No oral lesions.      Dentition: Has dentures.      Pharynx: Oropharynx is clear.   Eyes:      General: No scleral icterus.     Extraocular Movements: Extraocular movements intact.      Conjunctiva/sclera: Conjunctivae normal.      Pupils: Pupils are equal, round, and reactive to light.   Neck:      Thyroid: No thyroid mass or thyromegaly.      Vascular: No carotid bruit or JVD.      Trachea: No tracheal deviation.   Cardiovascular:      Rate and Rhythm: Normal rate and regular rhythm.      Pulses: no weak pulses.           Carotid pulses are 2+ on the right side and 2+ on the left side.       Dorsalis pedis pulses are 2+ on the right side and 2+ on the left side.        Posterior tibial pulses are 2+ on the right side and 2+ on the left side.      Heart sounds: Normal heart sounds. No murmur heard.     No gallop.   Pulmonary:      Effort: Pulmonary effort is normal. No respiratory distress.      Breath sounds: Normal breath sounds. No wheezing or rales.   Abdominal:      General: Bowel sounds are normal. There is no distension or abdominal bruit.      Palpations: Abdomen is soft. There is no hepatomegaly, splenomegaly or mass.      Tenderness: There is no abdominal tenderness. There is no guarding or rebound.      Hernia: A hernia is present.      Comments: Small reducible umbilical hernia   Musculoskeletal:      Right lower leg: No edema.      Left lower leg: No edema.   Feet:      Right foot:      Skin integrity: " No ulcer, skin breakdown, erythema, warmth, callus or dry skin.      Left foot:      Skin integrity: No ulcer, skin breakdown, erythema, warmth, callus or dry skin.   Lymphadenopathy:      Cervical: No cervical adenopathy.      Upper Body:      Right upper body: No supraclavicular adenopathy.      Left upper body: No supraclavicular adenopathy.   Skin:     Findings: No rash.      Nails: There is no clubbing.   Neurological:      General: No focal deficit present.      Mental Status: He is alert and oriented to person, place, and time.   Psychiatric:         Mood and Affect: Mood normal.       Patient's shoes and socks removed.    Right Foot/Ankle   Right Foot Inspection  Skin Exam: skin normal and skin intact. No dry skin, no warmth, no callus, no erythema, no maceration, no abnormal color, no pre-ulcer, no ulcer and no callus.     Toe Exam: ROM and strength within normal limits. No swelling, no tenderness, erythema and  no right toe deformity    Sensory   Monofilament testing: intact    Vascular  Capillary refills: < 3 seconds  The right DP pulse is 2+. The right PT pulse is 2+.     Left Foot/Ankle  Left Foot Inspection  Skin Exam: skin normal and skin intact. No dry skin, no warmth, no erythema, no maceration, normal color, no pre-ulcer, no ulcer and no callus.     Toe Exam: ROM and strength within normal limits. No swelling, no tenderness, no erythema and no left toe deformity.     Sensory   Monofilament testing: intact    Vascular  Capillary refills: < 3 seconds  The left DP pulse is 2+. The left PT pulse is 2+.     Assign Risk Category  No deformity present  No loss of protective sensation  No weak pulses  Risk: 0       Linwood Harvey MD

## 2024-04-30 ENCOUNTER — OFFICE VISIT (OUTPATIENT)
Dept: FAMILY MEDICINE CLINIC | Facility: CLINIC | Age: 69
End: 2024-04-30
Payer: COMMERCIAL

## 2024-04-30 VITALS
HEART RATE: 72 BPM | WEIGHT: 251 LBS | HEIGHT: 69 IN | DIASTOLIC BLOOD PRESSURE: 66 MMHG | BODY MASS INDEX: 37.18 KG/M2 | OXYGEN SATURATION: 97 % | TEMPERATURE: 97.7 F | SYSTOLIC BLOOD PRESSURE: 132 MMHG | RESPIRATION RATE: 16 BRPM

## 2024-04-30 DIAGNOSIS — E11.9 TYPE 2 DIABETES MELLITUS WITHOUT COMPLICATION, WITHOUT LONG-TERM CURRENT USE OF INSULIN (HCC): Primary | ICD-10-CM

## 2024-04-30 DIAGNOSIS — I10 BENIGN ESSENTIAL HYPERTENSION: ICD-10-CM

## 2024-04-30 DIAGNOSIS — E66.01 OBESITY, MORBID (HCC): ICD-10-CM

## 2024-04-30 DIAGNOSIS — E78.2 MIXED HYPERLIPIDEMIA: ICD-10-CM

## 2024-04-30 DIAGNOSIS — K76.0 FATTY LIVER: ICD-10-CM

## 2024-04-30 PROCEDURE — 99214 OFFICE O/P EST MOD 30 MIN: CPT | Performed by: FAMILY MEDICINE

## 2024-04-30 PROCEDURE — 3078F DIAST BP <80 MM HG: CPT | Performed by: FAMILY MEDICINE

## 2024-04-30 PROCEDURE — 1160F RVW MEDS BY RX/DR IN RCRD: CPT | Performed by: FAMILY MEDICINE

## 2024-04-30 PROCEDURE — 1159F MED LIST DOCD IN RCRD: CPT | Performed by: FAMILY MEDICINE

## 2024-04-30 PROCEDURE — 3075F SYST BP GE 130 - 139MM HG: CPT | Performed by: FAMILY MEDICINE

## 2024-04-30 PROCEDURE — 3725F SCREEN DEPRESSION PERFORMED: CPT | Performed by: FAMILY MEDICINE

## 2024-04-30 PROCEDURE — G2211 COMPLEX E/M VISIT ADD ON: HCPCS | Performed by: FAMILY MEDICINE

## 2024-06-21 DIAGNOSIS — E78.2 MIXED HYPERLIPIDEMIA: ICD-10-CM

## 2024-06-21 RX ORDER — ATORVASTATIN CALCIUM 10 MG/1
10 TABLET, FILM COATED ORAL DAILY
Qty: 90 TABLET | Refills: 1 | Status: SHIPPED | OUTPATIENT
Start: 2024-06-21

## 2024-08-12 ENCOUNTER — 6 MONTH FOLLOW UP (OUTPATIENT)
Dept: URBAN - METROPOLITAN AREA CLINIC 6 | Facility: CLINIC | Age: 69
End: 2024-08-12

## 2024-08-12 DIAGNOSIS — H04.123: ICD-10-CM

## 2024-08-12 DIAGNOSIS — H16.123: ICD-10-CM

## 2024-08-12 PROCEDURE — 92012 INTRM OPH EXAM EST PATIENT: CPT

## 2024-08-12 ASSESSMENT — TONOMETRY
OD_IOP_MMHG: 15
OS_IOP_MMHG: 12

## 2024-08-12 ASSESSMENT — VISUAL ACUITY
OD_CC: 20/25
OS_CC: 20/30-1

## 2024-10-17 LAB
ALBUMIN SERPL-MCNC: 4 G/DL (ref 3.5–5.7)
ALBUMIN/CREAT UR: 18.1
ALP SERPL-CCNC: 64 U/L (ref 35–120)
ALT SERPL-CCNC: 24 U/L
ANION GAP SERPL CALCULATED.3IONS-SCNC: 11 MMOL/L (ref 3–11)
AST SERPL-CCNC: 15 U/L
BASOPHILS # BLD AUTO: 0.1 THOU/CMM (ref 0–0.1)
BASOPHILS NFR BLD AUTO: 1 %
BILIRUB SERPL-MCNC: 0.7 MG/DL (ref 0.2–1)
BUN SERPL-MCNC: 21 MG/DL (ref 7–28)
CALCIUM SERPL-MCNC: 9.3 MG/DL (ref 8.5–10.1)
CHLORIDE SERPL-SCNC: 101 MMOL/L (ref 100–109)
CHOLEST SERPL-MCNC: 112 MG/DL
CHOLEST/HDLC SERPL: 3.5 {RATIO}
CO2 SERPL-SCNC: 25 MMOL/L (ref 21–31)
CREAT SERPL-MCNC: 0.98 MG/DL (ref 0.53–1.3)
CREAT UR-MCNC: 138.3 MG/DL (ref 50–200)
CYTOLOGY CMNT CVX/VAG CYTO-IMP: ABNORMAL
DIFFERENTIAL METHOD BLD: NORMAL
EOSINOPHIL # BLD AUTO: 0.2 THOU/CMM (ref 0–0.5)
EOSINOPHIL NFR BLD AUTO: 2 %
ERYTHROCYTE [DISTWIDTH] IN BLOOD BY AUTOMATED COUNT: 15 % (ref 12–16)
EST. AVERAGE GLUCOSE BLD GHB EST-MCNC: 140 MG/DL
GFR/BSA.PRED SERPLBLD CYS-BASED-ARV: 82 ML/MIN/{1.73_M2}
GLUCOSE SERPL-MCNC: 103 MG/DL (ref 65–99)
HBA1C MFR BLD: 6.5 %
HCT VFR BLD AUTO: 38.2 % (ref 37–48)
HDLC SERPL-MCNC: 32 MG/DL (ref 23–92)
HGB BLD-MCNC: 12.9 G/DL (ref 12.5–17)
LDLC SERPL CALC-MCNC: 56 MG/DL
LYMPHOCYTES # BLD AUTO: 1.9 THOU/CMM (ref 1–3)
LYMPHOCYTES NFR BLD AUTO: 25 %
MCH RBC QN AUTO: 31.8 PG (ref 27–36)
MCHC RBC AUTO-ENTMCNC: 33.8 G/DL (ref 32–37)
MCV RBC AUTO: 94 FL (ref 80–100)
MICROALBUMIN UR-MCNC: 2.5 MG/DL
MONOCYTES # BLD AUTO: 0.7 THOU/CMM (ref 0.3–1)
MONOCYTES NFR BLD AUTO: 10 %
NEUTROPHILS # BLD AUTO: 4.7 THOU/CMM (ref 1.8–7.8)
NEUTROPHILS NFR BLD AUTO: 62 %
NONHDLC SERPL-MCNC: 80 MG/DL
PLATELET # BLD AUTO: 254 THOU/CMM (ref 140–350)
PMV BLD REES-ECKER: 7.9 FL (ref 7.5–11.3)
POTASSIUM SERPL-SCNC: 4.7 MMOL/L (ref 3.5–5.2)
PROT SERPL-MCNC: 6.4 G/DL (ref 6.3–8.3)
RBC # BLD AUTO: 4.06 MILL/CMM (ref 4–5.4)
SODIUM SERPL-SCNC: 137 MMOL/L (ref 135–145)
TRIGL SERPL-MCNC: 118 MG/DL
WBC # BLD AUTO: 7.6 THOU/CMM (ref 4–10.5)

## 2024-10-23 ENCOUNTER — RA CDI HCC (OUTPATIENT)
Dept: OTHER | Facility: HOSPITAL | Age: 69
End: 2024-10-23

## 2024-10-30 ENCOUNTER — OFFICE VISIT (OUTPATIENT)
Dept: FAMILY MEDICINE CLINIC | Facility: CLINIC | Age: 69
End: 2024-10-30
Payer: COMMERCIAL

## 2024-10-30 VITALS
TEMPERATURE: 98 F | BODY MASS INDEX: 36.58 KG/M2 | WEIGHT: 247 LBS | HEIGHT: 69 IN | SYSTOLIC BLOOD PRESSURE: 130 MMHG | HEART RATE: 77 BPM | OXYGEN SATURATION: 95 % | DIASTOLIC BLOOD PRESSURE: 68 MMHG | RESPIRATION RATE: 18 BRPM

## 2024-10-30 DIAGNOSIS — Z00.00 MEDICARE ANNUAL WELLNESS VISIT, SUBSEQUENT: ICD-10-CM

## 2024-10-30 DIAGNOSIS — G47.33 OBSTRUCTIVE SLEEP APNEA: ICD-10-CM

## 2024-10-30 DIAGNOSIS — Z12.5 SCREENING FOR PROSTATE CANCER: ICD-10-CM

## 2024-10-30 DIAGNOSIS — E11.9 TYPE 2 DIABETES MELLITUS WITHOUT COMPLICATION, WITHOUT LONG-TERM CURRENT USE OF INSULIN (HCC): Primary | ICD-10-CM

## 2024-10-30 DIAGNOSIS — Z86.711 HISTORY OF PULMONARY EMBOLUS (PE): ICD-10-CM

## 2024-10-30 DIAGNOSIS — E78.2 MIXED HYPERLIPIDEMIA: ICD-10-CM

## 2024-10-30 DIAGNOSIS — K76.0 FATTY LIVER: ICD-10-CM

## 2024-10-30 DIAGNOSIS — I10 BENIGN ESSENTIAL HYPERTENSION: ICD-10-CM

## 2024-10-30 PROBLEM — E66.01 OBESITY, MORBID (HCC): Status: RESOLVED | Noted: 2021-03-17 | Resolved: 2024-10-30

## 2024-10-30 PROCEDURE — 99214 OFFICE O/P EST MOD 30 MIN: CPT | Performed by: FAMILY MEDICINE

## 2024-10-30 PROCEDURE — G0439 PPPS, SUBSEQ VISIT: HCPCS | Performed by: FAMILY MEDICINE

## 2024-10-30 RX ORDER — LISINOPRIL 20 MG/1
20 TABLET ORAL DAILY
Qty: 90 TABLET | Refills: 3 | Status: SHIPPED | OUTPATIENT
Start: 2024-10-30

## 2024-10-30 RX ORDER — ATORVASTATIN CALCIUM 10 MG/1
10 TABLET, FILM COATED ORAL DAILY
Qty: 90 TABLET | Refills: 3 | Status: SHIPPED | OUTPATIENT
Start: 2024-10-30

## 2024-10-30 NOTE — PROGRESS NOTES
Ambulatory Visit  Name: Ramiro Haro      : 1955      MRN: [id number]  Encounter Provider: Linwood Harvey MD  Encounter Date: 10/30/2024   Encounter department: [facility]    Assessment & Plan  Type 2 diabetes mellitus without complication, without long-term current use of insulin (HCC)    Lab Results   Component Value Date    HGBA1C 6.5 (H) 10/17/2024            Benign essential hypertension         Mixed hyperlipidemia         Obstructive sleep apnea         Fatty liver         Medicare annual wellness visit, subsequent            Preventive health issues were discussed with patient, and age appropriate screening tests were ordered as noted in patient's After Visit Summary. Personalized health advice and appropriate referrals for health education or preventive services given if needed, as noted in patient's After Visit Summary.    History of Present Illness     HPI   Patient Care Team:  Linwood Harvey MD as PCP - General  LALO Hill MD (General Surgery)  Silverio Scott MD (Colon and Rectal Surgery)    Review of Systems  Medical History Reviewed by provider this encounter:       Annual Wellness Visit Questionnaire       Depression Screening:   PHQ-2 Score: 0      Social Determinants of Health     Financial Resource Strain: Low Risk  (2023)    Overall Financial Resource Strain (CARDIA)     Difficulty of Paying Living Expenses: Not very hard   Food Insecurity: No Food Insecurity (10/25/2024)    Hunger Vital Sign     Worried About Running Out of Food in the Last Year: Never true     Ran Out of Food in the Last Year: Never true   Transportation Needs: No Transportation Needs (10/25/2024)    PRAPARE - Transportation     Lack of Transportation (Medical): No     Lack of Transportation (Non-Medical): No   Housing Stability: Unknown (10/25/2024)    Housing Stability Vital Sign     Unable to Pay for Housing in the Last Year: No   Utilities: Not At Risk (10/25/2024)    [facility]  "Utilities     Threatened with loss of utilities: No     No results found.    Objective     /68 (BP Location: Left arm, Patient Position: Sitting, Cuff Size: Large)   Pulse 77   Temp 98 °F (36.7 °C)   Resp 18   Ht 5' 9\" (1.753 m)   Wt 112 kg (247 lb)   SpO2 95%   BMI 36.48 kg/m²     Physical Exam    "

## 2024-10-30 NOTE — PROGRESS NOTES
Ambulatory Visit  Name: Ramiro Haro      : 1955      MRN: [id number]  Encounter Provider: Linwood Harvey MD  Encounter Date: 10/30/2024   Encounter department: [facility]    Assessment & Plan  Type 2 diabetes mellitus without complication, without long-term current use of insulin (HCC)  Type 2 DM diet controlled. 10/2024 . A1c 6.5. Urine albumin normal at 2.5. on ACE. No DPN. Current with eye exam  No history of retinopathy.      Lab Results   Component Value Date    HGBA1C 6.5 (H) 10/17/2024       Orders:    Hemoglobin A1C    Benign essential hypertension    Hypertension blood pressures have been stable on Lisinopril 10 mg daily.       BP Readings from Last 3 Encounters:   10/30/24 130/68   24 132/66   10/04/23 130/68         Lab Results   Component Value Date    SODIUM 137 10/17/2024    K 4.7 10/17/2024     10/17/2024    CO2 25 10/17/2024    AGAP 11 10/17/2024    BUN 21 10/17/2024    CREATININE 0.98 10/17/2024    GLUC 103 (H) 10/17/2024    CALCIUM 9.3 10/17/2024    AST 15 10/17/2024    ALT 24 10/17/2024    ALKPHOS 64 10/17/2024    TP 6.4 10/17/2024    TBILI 0.7 10/17/2024    EGFR 82 10/17/2024     Lab Results   Component Value Date    WBC 7.6 10/17/2024    HGB 12.9 10/17/2024    HCT 38.2 10/17/2024    MCV 94 10/17/2024     10/17/2024         Orders:    lisinopril (ZESTRIL) 20 mg tablet; Take 1 tablet (20 mg total) by mouth daily    CBC and differential    Comprehensive metabolic panel    Mixed hyperlipidemia    Hyperlipidemia mixed type on Atorvastatin 10 mg daily and 2 fish oils/day       Lab Results   Component Value Date    CHOLESTEROL 112 10/17/2024    CHOLESTEROL 121 04/15/2024     Lab Results   Component Value Date    HDL 32 10/17/2024    HDL 32 04/15/2024     Lab Results   Component Value Date    TRIG 118 10/17/2024    TRIG 129 04/15/2024     Lab Results   Component Value Date    LDLCALC 56 10/17/2024     Lab Results   Component Value Date    TSH 0.74  09/20/2022     BMI Counseling: Body mass index is 36.48 kg/m². The BMI is above normal. Nutrition recommendations include consuming healthier snacks, moderation in carbohydrate intake, reducing intake of saturated fat and trans fat, and reducing intake of cholesterol.      Orders:    atorvastatin (LIPITOR) 10 mg tablet; Take 1 tablet (10 mg total) by mouth daily    Lipid panel    Obstructive sleep apnea     On CPAP        Fatty liver    Lab Results   Component Value Date    ALT 24 10/17/2024    AST 15 10/17/2024    ALKPHOS 64 10/17/2024     FIB 4 score-0.86         Medicare annual wellness visit, subsequent         History of pulmonary embolus (PE)         Screening for prostate cancer    Orders:    PSA, Total Screen; Future      Depression Screening and Follow-up Plan: Patient was screened for depression during today's encounter. They screened negative with a PHQ-2 score of 0.    Tobacco Cessation Counseling: Tobacco cessation counseling was provided. The patient is sincerely urged to quit consumption of tobacco. He is not ready to quit tobacco.       OV 6 months with labs.   Up to date with flu vaccine. He is not interested in an updated COV 19 vaccine     Preventive health issues were discussed with patient, and age appropriate screening tests were ordered as noted in patient's After Visit Summary. Personalized health advice and appropriate referrals for health education or preventive services given if needed, as noted in patient's After Visit Summary.    History of Present Illness       Followup visit for chronic medical problems/AWV Medications reviewed.          Patient Care Team:  Linwood Harvey MD as PCP - General  LALO Hill MD (General Surgery)  Silverio Scott MD (Colon and Rectal Surgery)    Review of Systems   Constitutional:  Positive for unexpected weight change (4 lb weight loss from 04/2024). Negative for appetite change, chills, fatigue and fever.   HENT:  Negative for congestion, ear  pain, hearing loss, rhinorrhea, sore throat and trouble swallowing.    Eyes:  Negative for visual disturbance.   Respiratory:  Positive for apnea. Negative for cough, shortness of breath and wheezing.         06/2018 admission for bilateral pulmonary emboli.   Venous Dopplers were not done in the hospital.  PE unprovoked with no history of recent travel. no hospitalizations or surgeries.  No family history of DVT/PE.  No personal history of DVT or pulmonary embolus.  No cancer history.   Workup in the hospital chest x-ray small area of infiltrate/atelectasis left lung base and small left effusion.  Probable minimal right pleural effusion.  CT scan abdomen and pelvis without contrast similar mild to moderate left hydronephrosis.  left renal pelvic calculi seen.  Small bilateral effusions and bilateral dependent subsegmental atelectasis. Normal liver, spleen and pancreas. No lymphadenopathy.   CT scan chest showed bilateral pulmonary emboli left greater than right in the lower lobes.  Pleural effusions left greater than right.  Basal atelectasis.  Possibly small left lower lobe pulmonary infarct.  No evidence of right cardiac pressure overload.  Mild pericardial effusion.  Echocardiogram normal left ventricular chamber size normal left ventricular systolic function.  No regional wall motion abnormalities.  Mild concentric LV H. EF 60%.  Moderate tricuspid regurgitation.  Normal pulmonary artery systolic blood pressure.  Mild diastolic dysfunction with normal filling pressures.  Small pericardial effusion without evidence of hemodynamic compromise.  EKG normal sinus rhythm no acute changes. Patient completed 6 months of Eliquis.     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     08/2019 D dimer < 0.27   Cardiovascular:  Negative for chest pain, palpitations and leg swelling.   Gastrointestinal:  Negative for abdominal pain, blood in stool, constipation, diarrhea, nausea and vomiting.          10/2022  colorectal surgery OV for banding of hemorrhoids. Colonoscopy 11/2022    Endocrine: Negative for polydipsia and polyuria.   Genitourinary:  Negative for difficulty urinating.        Lab Results       Component                Value               Date                       PSA                      1.08                04/15/2024               Musculoskeletal:  Negative for arthralgias and myalgias.   Skin:  Negative for rash.   Allergic/Immunologic: Negative for environmental allergies.   Neurological:  Negative for dizziness and headaches.   Hematological:  Negative for adenopathy. Does not bruise/bleed easily.   Psychiatric/Behavioral:  Negative for dysphoric mood and sleep disturbance. The patient is not nervous/anxious.        Current Outpatient Medications:     aspirin (ECOTRIN LOW STRENGTH) 81 mg EC tablet, Take 81 mg by mouth daily, Disp: , Rfl:     atorvastatin (LIPITOR) 10 mg tablet, Take 1 tablet (10 mg total) by mouth daily, Disp: 90 tablet, Rfl: 3    lisinopril (ZESTRIL) 20 mg tablet, Take 1 tablet (20 mg total) by mouth daily, Disp: 90 tablet, Rfl: 3    multivitamin (THERAGRAN) TABS, Take 1 tablet by mouth, Disp: , Rfl:     Omega-3 Fatty Acids (FISH OIL PO), Take 1 g by mouth daily, Disp: , Rfl:     prednisoLONE acetate (PRED FORTE) 1 % ophthalmic suspension, INSTILL 1 DROP INTO BOTH EYES TWICE A DAY, Disp: , Rfl:     Prolensa 0.07 % SOLN, Administer 1 drop to both eyes in the morning, Disp: , Rfl:      Family History   Problem Relation Age of Onset    Diabetes type II Father       Social History     Socioeconomic History    Marital status:      Spouse name: Not on file    Number of children: Not on file    Years of education: Not on file    Highest education level: Not on file   Occupational History    Not on file   Tobacco Use    Smoking status: Every Day     Current packs/day: 0.50     Average packs/day: 0.5 packs/day for 35.0 years (17.5 ttl pk-yrs)     Types: Cigarettes    Smokeless  tobacco: Never   Vaping Use    Vaping status: Never Used   Substance and Sexual Activity    Alcohol use: No    Drug use: No    Sexual activity: Yes     Partners: Female     Birth control/protection: None   Other Topics Concern    Not on file   Social History Narrative    Not on file     Social Determinants of Health     Financial Resource Strain: Low Risk  (9/29/2023)    Overall Financial Resource Strain (CARDIA)     Difficulty of Paying Living Expenses: Not very hard   Food Insecurity: No Food Insecurity (10/30/2024)    Hunger Vital Sign     Worried About Running Out of Food in the Last Year: Never true     Ran Out of Food in the Last Year: Never true   Transportation Needs: No Transportation Needs (10/30/2024)    PRAPARE - Transportation     Lack of Transportation (Medical): No     Lack of Transportation (Non-Medical): No   Physical Activity: Not on file   Stress: Not on file   Social Connections: Not on file   Intimate Partner Violence: Not on file   Housing Stability: Low Risk  (10/30/2024)    Housing Stability Vital Sign     Unable to Pay for Housing in the Last Year: No     Number of Times Moved in the Last Year: 0     Homeless in the Last Year: No         Medical History Reviewed by provider this encounter:       Annual Wellness Visit Questionnaire   Ramiro is here for his Subsequent Wellness visit. Last Medicare Wellness visit information reviewed, patient interviewed and updates made to the record today.      Health Risk Assessment:   Patient rates overall health as very good. Patient feels that their physical health rating is same. Patient is satisfied with their life. Eyesight was rated as same. Hearing was rated as same. Patient feels that their emotional and mental health rating is same. Patients states they are never, rarely angry. Patient states they are never, rarely unusually tired/fatigued. Pain experienced in the last 7 days has been none. Patient states that he has experienced no weight loss or  gain in last 6 months.     Depression Screening:   PHQ-2 Score: 0      Fall Risk Screening:   In the past year, patient has experienced: no history of falling in past year      Home Safety:  Patient does not have trouble with stairs inside or outside of their home. Patient has working smoke alarms and has working carbon monoxide detector. Home safety hazards include: none.     Nutrition:   Current diet is Regular.     Medications:   Patient is currently taking over-the-counter supplements. OTC medications include: see medication list. Patient is able to manage medications.     Activities of Daily Living (ADLs)/Instrumental Activities of Daily Living (IADLs):   Walk and transfer into and out of bed and chair?: Yes  Dress and groom yourself?: Yes    Bathe or shower yourself?: Yes    Feed yourself? Yes  Do your laundry/housekeeping?: Yes  Manage your money, pay your bills and track your expenses?: Yes  Make your own meals?: Yes    Do your own shopping?: Yes    Durable Medical Equipment Suppliers  [employer]    Previous Hospitalizations:   Any hospitalizations or ED visits within the last 12 months?: No      Advance Care Planning:   Living will: No    Durable POA for healthcare: No    Advanced directive: No      Cognitive Screening:   Provider or family/friend/caregiver concerned regarding cognition?: No    PREVENTIVE SCREENINGS      Cardiovascular Screening:    General: History Lipid Disorder and Screening Current      Diabetes Screening:     General: Screening Not Indicated and History Diabetes      Colorectal Cancer Screening:     General: Screening Current      Prostate Cancer Screening:    General: Screening Current      Osteoporosis Screening:    General: Screening Not Indicated      Abdominal Aortic Aneurysm (AAA) Screening:    Risk factors include: age between 65-76 yo and tobacco use        General: Screening Current      Lung Cancer Screening:     General: Patient Declines      Hepatitis C Screening:    General:  "Screening Current    Screening, Brief Intervention, and Referral to Treatment (SBIRT)    Screening  Typical number of drinks in a day: 0  Typical number of drinks in a week: 0  Interpretation: Low risk drinking behavior.    AUDIT-C Screenin) How often did you have a drink containing alcohol in the past year? never  2) How many drinks did you have on a typical day when you were drinking in the past year? 0  3) How often did you have 6 or more drinks on one occasion in the past year? never    AUDIT-C Score: 0  Interpretation: Score 0-3 (male): Negative screen for alcohol misuse    Single Item Drug Screening:  How often have you used an illegal drug (including marijuana) or a prescription medication for non-medical reasons in the past year? never    Single Item Drug Screen Score: 0  Interpretation: Negative screen for possible drug use disorder    Brief Intervention  Alcohol & drug use screenings were reviewed. No concerns regarding substance use disorder identified.     Other Counseling Topics:   Calcium and vitamin D intake and regular weightbearing exercise.       Objective     /68 (BP Location: Left arm, Patient Position: Sitting, Cuff Size: Large)   Pulse 77   Temp 98 °F (36.7 °C)   Resp 18   Ht 5' 9\" (1.753 m)   Wt 112 kg (247 lb)   SpO2 95%   BMI 36.48 kg/m²     Wt Readings from Last 3 Encounters:   10/30/24 112 kg (247 lb)   24 114 kg (251 lb)   23 111 kg (244 lb)         Physical Exam  Constitutional:       General: He is not in acute distress.  HENT:      Right Ear: Tympanic membrane and ear canal normal.      Left Ear: Tympanic membrane and ear canal normal.      Mouth/Throat:      Mouth: No oral lesions.      Dentition: Has dentures.      Pharynx: Oropharynx is clear.   Eyes:      General: No scleral icterus.     Extraocular Movements: Extraocular movements intact.      Conjunctiva/sclera: Conjunctivae normal.      Pupils: Pupils are equal, round, and reactive to light.   Neck: "      Thyroid: No thyroid mass or thyromegaly.      Vascular: Normal carotid pulses. No carotid bruit.   Cardiovascular:      Rate and Rhythm: Normal rate and regular rhythm.      Heart sounds: No murmur heard.     No gallop.   Pulmonary:      Effort: Pulmonary effort is normal.      Breath sounds: Normal breath sounds. No wheezing or rales.   Musculoskeletal:      Right lower leg: No edema.      Left lower leg: No edema.   Lymphadenopathy:      Cervical: No cervical adenopathy.   Skin:     Findings: No rash.   Neurological:      General: No focal deficit present.      Mental Status: He is alert and oriented to person, place, and time.   Psychiatric:         Mood and Affect: Mood normal.         Cognition and Memory: Cognition normal.

## 2024-10-30 NOTE — ASSESSMENT & PLAN NOTE
Type 2 DM diet controlled. 10/2024 . A1c 6.5. Urine albumin normal at 2.5. on ACE. No DPN. Current with eye exam  No history of retinopathy.      Lab Results   Component Value Date    HGBA1C 6.5 (H) 10/17/2024       Orders:    Hemoglobin A1C

## 2024-10-30 NOTE — ASSESSMENT & PLAN NOTE
Hypertension blood pressures have been stable on Lisinopril 10 mg daily.       BP Readings from Last 3 Encounters:   10/30/24 130/68   04/30/24 132/66   10/04/23 130/68         Lab Results   Component Value Date    SODIUM 137 10/17/2024    K 4.7 10/17/2024     10/17/2024    CO2 25 10/17/2024    AGAP 11 10/17/2024    BUN 21 10/17/2024    CREATININE 0.98 10/17/2024    GLUC 103 (H) 10/17/2024    CALCIUM 9.3 10/17/2024    AST 15 10/17/2024    ALT 24 10/17/2024    ALKPHOS 64 10/17/2024    TP 6.4 10/17/2024    TBILI 0.7 10/17/2024    EGFR 82 10/17/2024     Lab Results   Component Value Date    WBC 7.6 10/17/2024    HGB 12.9 10/17/2024    HCT 38.2 10/17/2024    MCV 94 10/17/2024     10/17/2024         Orders:    lisinopril (ZESTRIL) 20 mg tablet; Take 1 tablet (20 mg total) by mouth daily    CBC and differential    Comprehensive metabolic panel

## 2024-10-30 NOTE — ASSESSMENT & PLAN NOTE
Lab Results   Component Value Date    ALT 24 10/17/2024    AST 15 10/17/2024    ALKPHOS 64 10/17/2024     FIB 4 score-0.86

## 2024-10-30 NOTE — PATIENT INSTRUCTIONS
Medicare Preventive Visit Patient Instructions  Thank you for completing your Welcome to Medicare Visit or Medicare Annual Wellness Visit today. Your next wellness visit will be due in one year (10/31/2025).  The screening/preventive services that you may require over the next 5-10 years are detailed below. Some tests may not apply to you based off risk factors and/or age. Screening tests ordered at today's visit but not completed yet may show as past due. Also, please note that scanned in results may not display below.  Preventive Screenings:  Service Recommendations Previous Testing/Comments   Colorectal Cancer Screening  Colonoscopy    Fecal Occult Blood Test (FOBT)/Fecal Immunochemical Test (FIT)  Fecal DNA/Cologuard Test  Flexible Sigmoidoscopy Age: 45-75 years old   Colonoscopy: every 10 years (May be performed more frequently if at higher risk)  OR  FOBT/FIT: every 1 year  OR  Cologuard: every 3 years  OR  Sigmoidoscopy: every 5 years  Screening may be recommended earlier than age 45 if at higher risk for colorectal cancer. Also, an individualized decision between you and your healthcare provider will decide whether screening between the ages of 76-85 would be appropriate. Colonoscopy: 11/16/2022  FOBT/FIT: Not on file  Cologuard: Not on file  Sigmoidoscopy: Not on file    Screening Current     Prostate Cancer Screening Individualized decision between patient and health care provider in men between ages of 55-69   Medicare will cover every 12 months beginning on the day after your 50th birthday PSA: 1.08 ng/mL     Screening Current     Hepatitis C Screening Once for adults born between 1945 and 1965  More frequently in patients at high risk for Hepatitis C Hep C Antibody: 03/04/2021    Screening Current   Diabetes Screening 1-2 times per year if you're at risk for diabetes or have pre-diabetes Fasting glucose: No results in last 5 years (No results in last 5 years)  A1C: 6.5 % (10/17/2024)  Screening Not  Indicated  History Diabetes   Cholesterol Screening Once every 5 years if you don't have a lipid disorder. May order more often based on risk factors. Lipid panel: 10/17/2024  Screening Not Indicated  History Lipid Disorder      Other Preventive Screenings Covered by Medicare:  Abdominal Aortic Aneurysm (AAA) Screening: covered once if your at risk. You're considered to be at risk if you have a family history of AAA or a male between the age of 65-75 who smoking at least 100 cigarettes in your lifetime.  Lung Cancer Screening: covers low dose CT scan once per year if you meet all of the following conditions: (1) Age 55-77; (2) No signs or symptoms of lung cancer; (3) Current smoker or have quit smoking within the last 15 years; (4) You have a tobacco smoking history of at least 20 pack years (packs per day x number of years you smoked); (5) You get a written order from a healthcare provider.  Glaucoma Screening: covered annually if you're considered high risk: (1) You have diabetes OR (2) Family history of glaucoma OR (3)  aged 50 and older OR (4)  American aged 65 and older  Osteoporosis Screening: covered every 2 years if you meet one of the following conditions: (1) Have a vertebral abnormality; (2) On glucocorticoid therapy for more than 3 months; (3) Have primary hyperparathyroidism; (4) On osteoporosis medications and need to assess response to drug therapy.  HIV Screening: covered annually if you're between the age of 15-65. Also covered annually if you are younger than 15 and older than 65 with risk factors for HIV infection. For pregnant patients, it is covered up to 3 times per pregnancy.    Immunizations:  Immunization Recommendations   Influenza Vaccine Annual influenza vaccination during flu season is recommended for all persons aged >= 6 months who do not have contraindications   Pneumococcal Vaccine   * Pneumococcal conjugate vaccine = PCV13 (Prevnar 13), PCV15 (Vaxneuvance),  PCV20 (Prevnar 20)  * Pneumococcal polysaccharide vaccine = PPSV23 (Pneumovax) Adults 19-65 yo with certain risk factors or if 65+ yo  If never received any pneumonia vaccine: recommend Prevnar 20 (PCV20)  Give PCV20 if previously received 1 dose of PCV13 or PPSV23   Hepatitis B Vaccine 3 dose series if at intermediate or high risk (ex: diabetes, end stage renal disease, liver disease)   Respiratory syncytial virus (RSV) Vaccine - COVERED BY MEDICARE PART D  * RSVPreF3 (Arexvy) CDC recommends that adults 60 years of age and older may receive a single dose of RSV vaccine using shared clinical decision-making (SCDM)   Tetanus (Td) Vaccine - COST NOT COVERED BY MEDICARE PART B Following completion of primary series, a booster dose should be given every 10 years to maintain immunity against tetanus. Td may also be given as tetanus wound prophylaxis.   Tdap Vaccine - COST NOT COVERED BY MEDICARE PART B Recommended at least once for all adults. For pregnant patients, recommended with each pregnancy.   Shingles Vaccine (Shingrix) - COST NOT COVERED BY MEDICARE PART B  2 shot series recommended in those 19 years and older who have or will have weakened immune systems or those 50 years and older     Health Maintenance Due:      Topic Date Due   • Colorectal Cancer Screening  11/16/2025   • Hepatitis C Screening  Completed     Immunizations Due:      Topic Date Due   • COVID-19 Vaccine (6 - 2023-24 season) 09/01/2024     Advance Directives   What are advance directives?  Advance directives are legal documents that state your wishes and plans for medical care. These plans are made ahead of time in case you lose your ability to make decisions for yourself. Advance directives can apply to any medical decision, such as the treatments you want, and if you want to donate organs.   What are the types of advance directives?  There are many types of advance directives, and each state has rules about how to use them. You may choose a  combination of any of the following:  Living will:  This is a written record of the treatment you want. You can also choose which treatments you do not want, which to limit, and which to stop at a certain time. This includes surgery, medicine, IV fluid, and tube feedings.   Durable power of  for healthcare (DPAHC):  This is a written record that states who you want to make healthcare choices for you when you are unable to make them for yourself. This person, called a proxy, is usually a family member or a friend. You may choose more than 1 proxy.  Do not resuscitate (DNR) order:  A DNR order is used in case your heart stops beating or you stop breathing. It is a request not to have certain forms of treatment, such as CPR. A DNR order may be included in other types of advance directives.  Medical directive:  This covers the care that you want if you are in a coma, near death, or unable to make decisions for yourself. You can list the treatments you want for each condition. Treatment may include pain medicine, surgery, blood transfusions, dialysis, IV or tube feedings, and a ventilator (breathing machine).  Values history:  This document has questions about your views, beliefs, and how you feel and think about life. This information can help others choose the care that you would choose.  Why are advance directives important?  An advance directive helps you control your care. Although spoken wishes may be used, it is better to have your wishes written down. Spoken wishes can be misunderstood, or not followed. Treatments may be given even if you do not want them. An advance directive may make it easier for your family to make difficult choices about your care.   Cigarette Smoking and Your Health   Risks to your health if you smoke:  Nicotine and other chemicals found in tobacco damage every cell in your body. Even if you are a light smoker, you have an increased risk for cancer, heart disease, and lung disease.  If you are pregnant or have diabetes, smoking increases your risk for complications.   Benefits to your health if you stop smoking:   You decrease respiratory symptoms such as coughing, wheezing, and shortness of breath.   You reduce your risk for cancers of the lung, mouth, throat, kidney, bladder, pancreas, stomach, and cervix. If you already have cancer, you increase the benefits of chemotherapy. You also reduce your risk for cancer returning or a second cancer from developing.   You reduce your risk for heart disease, blood clots, heart attack, and stroke.   You reduce your risk for lung infections, and diseases such as pneumonia, asthma, chronic bronchitis, and emphysema.  Your circulation improves. More oxygen can be delivered to your body. If you have diabetes, you lower your risk for complications, such as kidney, artery, and eye diseases. You also lower your risk for nerve damage. Nerve damage can lead to amputations, poor vision, and blindness.  You improve your body's ability to heal and to fight infections.  For more information and support to stop smoking:   [employer]  Phone: 0- 094 - 037-6231  Web Address: www.[employer]  Weight Management   Why it is important to manage your weight:  Being overweight increases your risk of health conditions such as heart disease, high blood pressure, type 2 diabetes, and certain types of cancer. It can also increase your risk for osteoarthritis, sleep apnea, and other respiratory problems. Aim for a slow, steady weight loss. Even a small amount of weight loss can lower your risk of health problems.  How to lose weight safely:  A safe and healthy way to lose weight is to eat fewer calories and get regular exercise. You can lose up about 1 pound a week by decreasing the number of calories you eat by 500 calories each day.   Healthy meal plan for weight management:  A healthy meal plan includes a variety of foods, contains fewer calories, and helps you stay  healthy. A healthy meal plan includes the following:  Eat whole-grain foods more often.  A healthy meal plan should contain fiber. Fiber is the part of grains, fruits, and vegetables that is not broken down by your body. Whole-grain foods are healthy and provide extra fiber in your diet. Some examples of whole-grain foods are whole-wheat breads and pastas, oatmeal, brown rice, and bulgur.  Eat a variety of vegetables every day.  Include dark, leafy greens such as spinach, kale, priscila greens, and mustard greens. Eat yellow and orange vegetables such as carrots, sweet potatoes, and winter squash.   Eat a variety of fruits every day.  Choose fresh or canned fruit (canned in its own juice or light syrup) instead of juice. Fruit juice has very little or no fiber.  Eat low-fat dairy foods.  Drink fat-free (skim) milk or 1% milk. Eat fat-free yogurt and low-fat cottage cheese. Try low-fat cheeses such as mozzarella and other reduced-fat cheeses.  Choose meat and other protein foods that are low in fat.  Choose beans or other legumes such as split peas or lentils. Choose fish, skinless poultry (chicken or turkey), or lean cuts of red meat (beef or pork). Before you cook meat or poultry, cut off any visible fat.   Use less fat and oil.  Try baking foods instead of frying them. Add less fat, such as margarine, sour cream, regular salad dressing and mayonnaise to foods. Eat fewer high-fat foods. Some examples of high-fat foods include french fries, doughnuts, ice cream, and cakes.  Eat fewer sweets.  Limit foods and drinks that are high in sugar. This includes candy, cookies, regular soda, and sweetened drinks.  Exercise:  Exercise at least 30 minutes per day on most days of the week. Some examples of exercise include walking, biking, dancing, and swimming. You can also fit in more physical activity by taking the stairs instead of the elevator or parking farther away from stores. Ask your healthcare provider about the best  exercise plan for you.      © Copyright [employer] 2018 Information is for End User's use only and may not be sold, redistributed or otherwise used for commercial purposes. All illustrations and images included in CareNotes® are the copyrighted property of [employer] or [employer]    Medicare Preventive Visit Patient Instructions  Thank you for completing your Welcome to Medicare Visit or Medicare Annual Wellness Visit today. Your next wellness visit will be due in one year (10/31/2025).  The screening/preventive services that you may require over the next 5-10 years are detailed below. Some tests may not apply to you based off risk factors and/or age. Screening tests ordered at today's visit but not completed yet may show as past due. Also, please note that scanned in results may not display below.  Preventive Screenings:  Service Recommendations Previous Testing/Comments   Colorectal Cancer Screening  Colonoscopy    Fecal Occult Blood Test (FOBT)/Fecal Immunochemical Test (FIT)  Fecal DNA/Cologuard Test  Flexible Sigmoidoscopy Age: 45-75 years old   Colonoscopy: every 10 years (May be performed more frequently if at higher risk)  OR  FOBT/FIT: every 1 year  OR  Cologuard: every 3 years  OR  Sigmoidoscopy: every 5 years  Screening may be recommended earlier than age 45 if at higher risk for colorectal cancer. Also, an individualized decision between you and your healthcare provider will decide whether screening between the ages of 76-85 would be appropriate. Colonoscopy: 11/16/2022  FOBT/FIT: Not on file  Cologuard: Not on file  Sigmoidoscopy: Not on file    Screening Current     Prostate Cancer Screening Individualized decision between patient and health care provider in men between ages of 55-69   Medicare will cover every 12 months beginning on the day after your 50th birthday PSA: 1.08 ng/mL     Screening Current     Hepatitis C Screening Once for adults born between 1945 and 1965  More  frequently in patients at high risk for Hepatitis C Hep C Antibody: 03/04/2021    Screening Current   Diabetes Screening 1-2 times per year if you're at risk for diabetes or have pre-diabetes Fasting glucose: No results in last 5 years (No results in last 5 years)  A1C: 6.5 % (10/17/2024)  Screening Not Indicated  History Diabetes   Cholesterol Screening Once every 5 years if you don't have a lipid disorder. May order more often based on risk factors. Lipid panel: 10/17/2024  Screening Not Indicated  History Lipid Disorder      Other Preventive Screenings Covered by Medicare:  Abdominal Aortic Aneurysm (AAA) Screening: covered once if your at risk. You're considered to be at risk if you have a family history of AAA or a male between the age of 65-75 who smoking at least 100 cigarettes in your lifetime.  Lung Cancer Screening: covers low dose CT scan once per year if you meet all of the following conditions: (1) Age 55-77; (2) No signs or symptoms of lung cancer; (3) Current smoker or have quit smoking within the last 15 years; (4) You have a tobacco smoking history of at least 20 pack years (packs per day x number of years you smoked); (5) You get a written order from a healthcare provider.  Glaucoma Screening: covered annually if you're considered high risk: (1) You have diabetes OR (2) Family history of glaucoma OR (3)  aged 50 and older OR (4)  American aged 65 and older  Osteoporosis Screening: covered every 2 years if you meet one of the following conditions: (1) Have a vertebral abnormality; (2) On glucocorticoid therapy for more than 3 months; (3) Have primary hyperparathyroidism; (4) On osteoporosis medications and need to assess response to drug therapy.  HIV Screening: covered annually if you're between the age of 15-65. Also covered annually if you are younger than 15 and older than 65 with risk factors for HIV infection. For pregnant patients, it is covered up to 3 times per  pregnancy.    Immunizations:  Immunization Recommendations   Influenza Vaccine Annual influenza vaccination during flu season is recommended for all persons aged >= 6 months who do not have contraindications   Pneumococcal Vaccine   * Pneumococcal conjugate vaccine = PCV13 (Prevnar 13), PCV15 (Vaxneuvance), PCV20 (Prevnar 20)  * Pneumococcal polysaccharide vaccine = PPSV23 (Pneumovax) Adults 19-63 yo with certain risk factors or if 65+ yo  If never received any pneumonia vaccine: recommend Prevnar 20 (PCV20)  Give PCV20 if previously received 1 dose of PCV13 or PPSV23   Hepatitis B Vaccine 3 dose series if at intermediate or high risk (ex: diabetes, end stage renal disease, liver disease)   Respiratory syncytial virus (RSV) Vaccine - COVERED BY MEDICARE PART D  * RSVPreF3 (Arexvy) CDC recommends that adults 60 years of age and older may receive a single dose of RSV vaccine using shared clinical decision-making (SCDM)   Tetanus (Td) Vaccine - COST NOT COVERED BY MEDICARE PART B Following completion of primary series, a booster dose should be given every 10 years to maintain immunity against tetanus. Td may also be given as tetanus wound prophylaxis.   Tdap Vaccine - COST NOT COVERED BY MEDICARE PART B Recommended at least once for all adults. For pregnant patients, recommended with each pregnancy.   Shingles Vaccine (Shingrix) - COST NOT COVERED BY MEDICARE PART B  2 shot series recommended in those 19 years and older who have or will have weakened immune systems or those 50 years and older     Health Maintenance Due:      Topic Date Due   • Colorectal Cancer Screening  11/16/2025   • Hepatitis C Screening  Completed     Immunizations Due:      Topic Date Due   • COVID-19 Vaccine (6 - 2023-24 season) 09/01/2024     Advance Directives   What are advance directives?  Advance directives are legal documents that state your wishes and plans for medical care. These plans are made ahead of time in case you lose your ability  to make decisions for yourself. Advance directives can apply to any medical decision, such as the treatments you want, and if you want to donate organs.   What are the types of advance directives?  There are many types of advance directives, and each state has rules about how to use them. You may choose a combination of any of the following:  Living will:  This is a written record of the treatment you want. You can also choose which treatments you do not want, which to limit, and which to stop at a certain time. This includes surgery, medicine, IV fluid, and tube feedings.   Durable power of  for healthcare (DPAHC):  This is a written record that states who you want to make healthcare choices for you when you are unable to make them for yourself. This person, called a proxy, is usually a family member or a friend. You may choose more than 1 proxy.  Do not resuscitate (DNR) order:  A DNR order is used in case your heart stops beating or you stop breathing. It is a request not to have certain forms of treatment, such as CPR. A DNR order may be included in other types of advance directives.  Medical directive:  This covers the care that you want if you are in a coma, near death, or unable to make decisions for yourself. You can list the treatments you want for each condition. Treatment may include pain medicine, surgery, blood transfusions, dialysis, IV or tube feedings, and a ventilator (breathing machine).  Values history:  This document has questions about your views, beliefs, and how you feel and think about life. This information can help others choose the care that you would choose.  Why are advance directives important?  An advance directive helps you control your care. Although spoken wishes may be used, it is better to have your wishes written down. Spoken wishes can be misunderstood, or not followed. Treatments may be given even if you do not want them. An advance directive may make it easier for your  family to make difficult choices about your care.   Cigarette Smoking and Your Health   Risks to your health if you smoke:  Nicotine and other chemicals found in tobacco damage every cell in your body. Even if you are a light smoker, you have an increased risk for cancer, heart disease, and lung disease. If you are pregnant or have diabetes, smoking increases your risk for complications.   Benefits to your health if you stop smoking:   You decrease respiratory symptoms such as coughing, wheezing, and shortness of breath.   You reduce your risk for cancers of the lung, mouth, throat, kidney, bladder, pancreas, stomach, and cervix. If you already have cancer, you increase the benefits of chemotherapy. You also reduce your risk for cancer returning or a second cancer from developing.   You reduce your risk for heart disease, blood clots, heart attack, and stroke.   You reduce your risk for lung infections, and diseases such as pneumonia, asthma, chronic bronchitis, and emphysema.  Your circulation improves. More oxygen can be delivered to your body. If you have diabetes, you lower your risk for complications, such as kidney, artery, and eye diseases. You also lower your risk for nerve damage. Nerve damage can lead to amputations, poor vision, and blindness.  You improve your body's ability to heal and to fight infections.  For more information and support to stop smoking:   [employer]  Phone: 9- 296 - 072-1796  Web Address: www.[employer]  Weight Management   Why it is important to manage your weight:  Being overweight increases your risk of health conditions such as heart disease, high blood pressure, type 2 diabetes, and certain types of cancer. It can also increase your risk for osteoarthritis, sleep apnea, and other respiratory problems. Aim for a slow, steady weight loss. Even a small amount of weight loss can lower your risk of health problems.  How to lose weight safely:  A safe and healthy way to lose  weight is to eat fewer calories and get regular exercise. You can lose up about 1 pound a week by decreasing the number of calories you eat by 500 calories each day.   Healthy meal plan for weight management:  A healthy meal plan includes a variety of foods, contains fewer calories, and helps you stay healthy. A healthy meal plan includes the following:  Eat whole-grain foods more often.  A healthy meal plan should contain fiber. Fiber is the part of grains, fruits, and vegetables that is not broken down by your body. Whole-grain foods are healthy and provide extra fiber in your diet. Some examples of whole-grain foods are whole-wheat breads and pastas, oatmeal, brown rice, and bulgur.  Eat a variety of vegetables every day.  Include dark, leafy greens such as spinach, kale, priscila greens, and mustard greens. Eat yellow and orange vegetables such as carrots, sweet potatoes, and winter squash.   Eat a variety of fruits every day.  Choose fresh or canned fruit (canned in its own juice or light syrup) instead of juice. Fruit juice has very little or no fiber.  Eat low-fat dairy foods.  Drink fat-free (skim) milk or 1% milk. Eat fat-free yogurt and low-fat cottage cheese. Try low-fat cheeses such as mozzarella and other reduced-fat cheeses.  Choose meat and other protein foods that are low in fat.  Choose beans or other legumes such as split peas or lentils. Choose fish, skinless poultry (chicken or turkey), or lean cuts of red meat (beef or pork). Before you cook meat or poultry, cut off any visible fat.   Use less fat and oil.  Try baking foods instead of frying them. Add less fat, such as margarine, sour cream, regular salad dressing and mayonnaise to foods. Eat fewer high-fat foods. Some examples of high-fat foods include french fries, doughnuts, ice cream, and cakes.  Eat fewer sweets.  Limit foods and drinks that are high in sugar. This includes candy, cookies, regular soda, and sweetened drinks.  Exercise:   Exercise at least 30 minutes per day on most days of the week. Some examples of exercise include walking, biking, dancing, and swimming. You can also fit in more physical activity by taking the stairs instead of the elevator or parking farther away from stores. Ask your healthcare provider about the best exercise plan for you.      © Copyright [employer] 2018 Information is for End User's use only and may not be sold, redistributed or otherwise used for commercial purposes. All illustrations and images included in CareNotes® are the copyrighted property of [employer] or [employer]

## 2024-10-30 NOTE — ASSESSMENT & PLAN NOTE
Hyperlipidemia mixed type on Atorvastatin 10 mg daily and 2 fish oils/day       Lab Results   Component Value Date    CHOLESTEROL 112 10/17/2024    CHOLESTEROL 121 04/15/2024     Lab Results   Component Value Date    HDL 32 10/17/2024    HDL 32 04/15/2024     Lab Results   Component Value Date    TRIG 118 10/17/2024    TRIG 129 04/15/2024     Lab Results   Component Value Date    LDLCALC 56 10/17/2024     Lab Results   Component Value Date    TSH 0.74 09/20/2022     BMI Counseling: Body mass index is 36.48 kg/m². The BMI is above normal. Nutrition recommendations include consuming healthier snacks, moderation in carbohydrate intake, reducing intake of saturated fat and trans fat, and reducing intake of cholesterol.      Orders:    atorvastatin (LIPITOR) 10 mg tablet; Take 1 tablet (10 mg total) by mouth daily    Lipid panel

## 2024-11-21 ENCOUNTER — TELEPHONE (OUTPATIENT)
Dept: ADMINISTRATIVE | Facility: OTHER | Age: 69
End: 2024-11-21

## 2024-11-21 NOTE — LETTER
Diabetic Eye Exam Form     Date Requested: 24  Patient: Ramiro Haro  Patient : 1955   Referring Provider: Linwood Harvey MD      DIABETIC Eye Exam Date _______________________________      Type of Exam MUST be documented for Diabetic Eye Exams. Please CHECK ONE.     Retinal Exam       Dilated Retinal Exam       OCT       Optomap-Iris Exam      Fundus Photography       Left Eye - Please check Retinopathy or No Retinopathy        Exam did show retinopathy    Exam did not show retinopathy       Right Eye - Please check Retinopathy or No Retinopathy       Exam did show retinopathy    Exam did not show retinopathy       Comments __________________________________________________________    Practice Providing Exam ______________________________________________    Exam Performed By (print name) _______________________________________      Provider Signature ___________________________________________________      These reports are needed for  compliance.  Please fax this completed form and a copy of the Diabetic Eye Exam report to our office located at [address] [place] [address] as soon as possible via Fax [phone] attention Sujey: Phone [phone]  We thank you for your assistance in treating our mutual patient.

## 2024-11-21 NOTE — TELEPHONE ENCOUNTER
Upon review of the In Basket request and the patient's chart, initial outreach has been made via fax to facility. Please see Contacts section for details.     Thank you  Sujey Rao MA

## 2024-11-21 NOTE — TELEPHONE ENCOUNTER
----- Message from Rosalba NETTLES sent at 11/20/2024  3:35 PM EST -----  Regarding: care gap request DM eye exam  11/20/24 3:35 PM    Hello, our patient attached above has had Diabetic Eye Exam completed/performed. Please assist in updating the patient chart by making an External outreach to Dr. Quan with [facility] facility located in [place], [place]. The date of service is within the past year.    Thank you,  Rosalba VOGT

## 2024-11-21 NOTE — LETTER
Diabetic Eye Exam Form(2-3-22 last Dm eye exam) Has patient been seen?    Date Requested: 24  Patient: Ramiro Haro  Patient : 1955   Referring Provider: Linwood Harvey MD      DIABETIC Eye Exam Date _______________________________      Type of Exam MUST be documented for Diabetic Eye Exams. Please CHECK ONE.     Retinal Exam       Dilated Retinal Exam       OCT       Optomap-Iris Exam      Fundus Photography       Left Eye - Please check Retinopathy or No Retinopathy        Exam did show retinopathy    Exam did not show retinopathy       Right Eye - Please check Retinopathy or No Retinopathy       Exam did show retinopathy    Exam did not show retinopathy       Comments __________________________________________________________    Practice Providing Exam ______________________________________________    Exam Performed By (print name) _______________________________________      Provider Signature ___________________________________________________      These reports are needed for  compliance.  Please fax this completed form and a copy of the Diabetic Eye Exam report to our office located at [address] [place] [address] as soon as possible via Fax [phone] tyler Rm: Phone [phone]  We thank you for your assistance in treating our mutual patient.

## 2024-11-22 NOTE — TELEPHONE ENCOUNTER
Upon review of the In Basket request we have found as a result of outreach that patient did not have the requested item(s) completed.     Any additional questions or concerns should be emailed to the Practice Liaisons via the appropriate education email address, please do not reply via In Basket.    Thank you  Sujey Rao MA   [facility]

## 2024-11-22 NOTE — TELEPHONE ENCOUNTER
Upon review of the In Basket request we have found as a result of outreach that patient did not have the requested item(s) completed.  As per [facility] no Dm records.  2nd letter out last dm eye was 2022. Called the patient to see where else he went and he stated he was not DM. I sent a message to clinical bin.    Any additional questions or concerns should be emailed to the Practice Liaisons via the appropriate education email address, please do not reply via In Basket.    Thank you  Sujey Rao MA   [facility]

## 2025-01-04 ENCOUNTER — VBI (OUTPATIENT)
Dept: ADMINISTRATIVE | Facility: OTHER | Age: 70
End: 2025-01-04

## 2025-01-05 NOTE — TELEPHONE ENCOUNTER
01/04/25 7:19 PM     Chart reviewed for Hemoglobin A1c was/were submitted to the patient's insurance.     Ngozi Cook   [facility]

## 2025-02-10 ENCOUNTER — 6 MONTH FOLLOW UP (OUTPATIENT)
Dept: URBAN - METROPOLITAN AREA CLINIC 6 | Facility: CLINIC | Age: 70
End: 2025-02-10

## 2025-02-10 DIAGNOSIS — H04.123: ICD-10-CM

## 2025-02-10 DIAGNOSIS — H16.123: ICD-10-CM

## 2025-02-10 DIAGNOSIS — H25.813: ICD-10-CM

## 2025-02-10 PROCEDURE — 92012 INTRM OPH EXAM EST PATIENT: CPT

## 2025-02-10 ASSESSMENT — VISUAL ACUITY
OS_CC: 20/25
OD_CC: 20/25
OD_SC: 20/40
OS_SC: 20/25

## 2025-02-10 ASSESSMENT — TONOMETRY
OD_IOP_MMHG: 12
OS_IOP_MMHG: 11

## 2025-05-15 LAB
ALBUMIN SERPL-MCNC: 4 G/DL (ref 3.5–5.7)
ALP SERPL-CCNC: 67 U/L (ref 35–120)
ALT SERPL-CCNC: 17 U/L
ANION GAP SERPL CALCULATED.3IONS-SCNC: 8 MMOL/L (ref 3–11)
AST SERPL-CCNC: 13 U/L
BASOPHILS # BLD AUTO: 0 THOU/CMM (ref 0–0.1)
BASOPHILS NFR BLD AUTO: 1 %
BILIRUB SERPL-MCNC: 0.5 MG/DL (ref 0.2–1)
BUN SERPL-MCNC: 21 MG/DL (ref 7–28)
CALCIUM SERPL-MCNC: 8.8 MG/DL (ref 8.5–10.5)
CHLORIDE SERPL-SCNC: 102 MMOL/L (ref 100–109)
CHOLEST SERPL-MCNC: 111 MG/DL
CHOLEST/HDLC SERPL: 3.7 {RATIO}
CO2 SERPL-SCNC: 27 MMOL/L (ref 21–31)
CREAT SERPL-MCNC: 0.89 MG/DL (ref 0.53–1.3)
CYTOLOGY CMNT CVX/VAG CYTO-IMP: ABNORMAL
DIFFERENTIAL METHOD BLD: NORMAL
EOSINOPHIL # BLD AUTO: 0.1 THOU/CMM (ref 0–0.5)
EOSINOPHIL NFR BLD AUTO: 2 %
ERYTHROCYTE [DISTWIDTH] IN BLOOD BY AUTOMATED COUNT: 14.9 % (ref 12–16)
EST. AVERAGE GLUCOSE BLD GHB EST-MCNC: 134 MG/DL
GFR/BSA.PRED SERPLBLD CYS-BASED-ARV: 91 ML/MIN/{1.73_M2}
GLUCOSE SERPL-MCNC: 110 MG/DL (ref 65–99)
HBA1C MFR BLD: 6.3 %
HCT VFR BLD AUTO: 38.9 % (ref 37–48)
HDLC SERPL-MCNC: 30 MG/DL (ref 23–92)
HGB BLD-MCNC: 13 G/DL (ref 12.5–17)
LDLC SERPL CALC-MCNC: 56 MG/DL
LYMPHOCYTES # BLD AUTO: 1.7 THOU/CMM (ref 1–3)
LYMPHOCYTES NFR BLD AUTO: 23 %
MCH RBC QN AUTO: 30.8 PG (ref 27–36)
MCHC RBC AUTO-ENTMCNC: 33.5 G/DL (ref 32–37)
MCV RBC AUTO: 92 FL (ref 80–100)
MONOCYTES # BLD AUTO: 0.6 THOU/CMM (ref 0.3–1)
MONOCYTES NFR BLD AUTO: 8 %
NEUTROPHILS # BLD AUTO: 4.8 THOU/CMM (ref 1.8–7.8)
NEUTROPHILS NFR BLD AUTO: 66 %
NONHDLC SERPL-MCNC: 81 MG/DL
PLATELET # BLD AUTO: 244 THOU/CMM (ref 140–350)
PMV BLD REES-ECKER: 7.6 FL (ref 7.5–11.3)
POTASSIUM SERPL-SCNC: 4.8 MMOL/L (ref 3.5–5.2)
PROT SERPL-MCNC: 6.4 G/DL (ref 6.3–8.3)
PSA SERPL-MCNC: 1.2 NG/ML
RBC # BLD AUTO: 4.23 MILL/CMM (ref 4–5.4)
SODIUM SERPL-SCNC: 137 MMOL/L (ref 135–145)
TRIGL SERPL-MCNC: 123 MG/DL
WBC # BLD AUTO: 7.3 THOU/CMM (ref 4–10.5)

## 2025-05-20 ENCOUNTER — OFFICE VISIT (OUTPATIENT)
Dept: FAMILY MEDICINE CLINIC | Facility: CLINIC | Age: 70
End: 2025-05-20
Payer: COMMERCIAL

## 2025-05-20 VITALS
RESPIRATION RATE: 18 BRPM | BODY MASS INDEX: 36.29 KG/M2 | TEMPERATURE: 97.6 F | DIASTOLIC BLOOD PRESSURE: 76 MMHG | HEART RATE: 77 BPM | WEIGHT: 245 LBS | SYSTOLIC BLOOD PRESSURE: 130 MMHG | OXYGEN SATURATION: 95 % | HEIGHT: 69 IN

## 2025-05-20 DIAGNOSIS — E78.2 MIXED HYPERLIPIDEMIA: ICD-10-CM

## 2025-05-20 DIAGNOSIS — K76.0 FATTY LIVER: ICD-10-CM

## 2025-05-20 DIAGNOSIS — G47.33 OBSTRUCTIVE SLEEP APNEA: ICD-10-CM

## 2025-05-20 DIAGNOSIS — E11.9 TYPE 2 DIABETES MELLITUS WITHOUT COMPLICATION, WITHOUT LONG-TERM CURRENT USE OF INSULIN (HCC): Primary | ICD-10-CM

## 2025-05-20 DIAGNOSIS — I10 BENIGN ESSENTIAL HYPERTENSION: ICD-10-CM

## 2025-05-20 PROCEDURE — 99214 OFFICE O/P EST MOD 30 MIN: CPT | Performed by: FAMILY MEDICINE

## 2025-05-20 PROCEDURE — G2211 COMPLEX E/M VISIT ADD ON: HCPCS | Performed by: FAMILY MEDICINE

## 2025-05-20 NOTE — PROGRESS NOTES
Name: Ramiro Haro      : 1955      MRN: [id number]  Encounter Provider: Linwood Harvey MD  Encounter Date: 2025   Encounter department: [facility]  :  Assessment & Plan  Type 2 diabetes mellitus without complication, without long-term current use of insulin ([facility])    Type 2 DM diet controlled. 2025 . A1c 6.3 decreased from 6.5. 102/204 Urine albumin normal at 2.5. on ACE. No DPN. Current with eye exam  No history of retinopathy.     Lab Results   Component Value Date    HGBA1C 6.3 (H) 05/15/2025       Orders:    Hemoglobin A1C    Albumin / creatinine urine ratio; Future    Continue with current medications.  Diet weight loss and exercise.  Office visit 6 months with repeat labs  Benign essential hypertension    Blood pressures have been stable on Lisinopril 10 mg daily.     BP Readings from Last 3 Encounters:   25 130/76   10/30/24 130/68   24 132/66           Lab Results   Component Value Date    SODIUM 137 05/15/2025    K 4.8 05/15/2025     05/15/2025    CO2 27 05/15/2025    AGAP 8 05/15/2025    BUN 21 05/15/2025    CREATININE 0.89 05/15/2025    GLUC 110 (H) 05/15/2025    CALCIUM 8.8 05/15/2025    AST 13 05/15/2025    ALT 17 05/15/2025    ALKPHOS 67 05/15/2025    TP 6.4 05/15/2025    TBILI 0.5 05/15/2025    EGFR 91 05/15/2025     Lab Results   Component Value Date    WBC 7.3 05/15/2025    HGB 13.0 05/15/2025    HCT 38.9 05/15/2025    MCV 92 05/15/2025     05/15/2025     Orders:    Comprehensive metabolic panel    CBC and differential      Mixed hyperlipidemia    Hyperlipidemia mixed type on Atorvastatin 10 mg daily and 2 fish oils/day       Lab Results   Component Value Date    CHOLESTEROL 111 05/15/2025    CHOLESTEROL 112 10/17/2024    CHOLESTEROL 121 04/15/2024     Lab Results   Component Value Date    HDL 30 05/15/2025    HDL 32 10/17/2024    HDL 32 04/15/2024     Lab Results   Component Value Date    TRIG 123 05/15/2025    TRIG 118 10/17/2024     TRIG 129 04/15/2024     Lab Results   Component Value Date    LDLCALC 56 05/15/2025       Orders:    Lipid panel      Obstructive sleep apnea      On CPAP            Fatty liver    Lab Results   Component Value Date    ALT 17 05/15/2025    AST 13 05/15/2025    ALKPHOS 67 05/15/2025     FIB-4 score 0.92                    History of Present Illness   HPI  Review of Systems   Constitutional:  Positive for unexpected weight change (6 lb weight loss from 04/2024). Negative for appetite change, chills, fatigue and fever.   HENT:  Negative for congestion, ear pain, hearing loss, rhinorrhea, sore throat and trouble swallowing.    Eyes:  Negative for visual disturbance.   Respiratory:  Positive for apnea. Negative for cough, shortness of breath and wheezing.         06/2018 admission for bilateral pulmonary emboli.   Venous Dopplers were not done in the hospital.  PE unprovoked with no history of recent travel. no hospitalizations or surgeries.  No family history of DVT/PE.  No personal history of DVT or pulmonary embolus.  No cancer history.   Workup in the hospital chest x-ray small area of infiltrate/atelectasis left lung base and small left effusion.  Probable minimal right pleural effusion.  CT scan abdomen and pelvis without contrast similar mild to moderate left hydronephrosis.  left renal pelvic calculi seen.  Small bilateral effusions and bilateral dependent subsegmental atelectasis. Normal liver, spleen and pancreas. No lymphadenopathy.   CT scan chest showed bilateral pulmonary emboli left greater than right in the lower lobes.  Pleural effusions left greater than right.  Basal atelectasis.  Possibly small left lower lobe pulmonary infarct.  No evidence of right cardiac pressure overload.  Mild pericardial effusion.  Echocardiogram normal left ventricular chamber size normal left ventricular systolic function.  No regional wall motion abnormalities.  Mild concentric LV H. EF 60%.  Moderate tricuspid  "regurgitation.  Normal pulmonary artery systolic blood pressure.  Mild diastolic dysfunction with normal filling pressures.  Small pericardial effusion without evidence of hemodynamic compromise.  EKG normal sinus rhythm no acute changes. Patient completed 6 months of Eliquis.     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     08/2019 D dimer < 0.27   Cardiovascular:  Negative for chest pain, palpitations and leg swelling.   Gastrointestinal:  Negative for abdominal pain, blood in stool, constipation, diarrhea, nausea and vomiting.         10/2022  colorectal surgery OV for banding of hemorrhoids. Colonoscopy 11/2022    Endocrine: Negative for polydipsia and polyuria.   Genitourinary:  Negative for difficulty urinating.        Lab Results       Component                Value               Date                       PSA                      1.20                05/15/2025                 PSA                      1.08                04/15/2024               Musculoskeletal:  Negative for arthralgias and myalgias.   Skin:  Negative for rash.   Allergic/Immunologic: Negative for environmental allergies.   Neurological:  Negative for dizziness and headaches.   Hematological:  Negative for adenopathy. Does not bruise/bleed easily.   Psychiatric/Behavioral:  Negative for dysphoric mood and sleep disturbance. The patient is not nervous/anxious.        Objective     /76 (BP Location: Left arm, Patient Position: Sitting, Cuff Size: Standard)   Pulse 77   Temp 97.6 °F (36.4 °C) (Temporal)   Resp 18   Ht 5' 9\" (1.753 m)   Wt 111 kg (245 lb)   SpO2 95%   BMI 36.18 kg/m²      Wt Readings from Last 3 Encounters:   05/20/25 111 kg (245 lb)   10/30/24 112 kg (247 lb)   04/30/24 114 kg (251 lb)          Physical Exam  Constitutional:       General: He is not in acute distress.  HENT:      Right Ear: Tympanic membrane and ear canal normal.      Left Ear: Tympanic membrane and ear canal normal. "     Eyes:      General: No scleral icterus.     Extraocular Movements: Extraocular movements intact.      Conjunctiva/sclera: Conjunctivae normal.      Pupils: Pupils are equal, round, and reactive to light.     Neck:      Thyroid: No thyroid mass or thyromegaly.      Vascular: No carotid bruit.     Cardiovascular:      Rate and Rhythm: Normal rate and regular rhythm.      Pulses: no weak pulses.           Dorsalis pedis pulses are 2+ on the right side and 2+ on the left side.        Posterior tibial pulses are 2+ on the right side and 2+ on the left side.      Heart sounds: No murmur heard.     No gallop.   Pulmonary:      Effort: Pulmonary effort is normal.      Breath sounds: Normal breath sounds. No wheezing or rales.   Abdominal:      General: Bowel sounds are normal. There is no distension.      Palpations: Abdomen is soft. There is no hepatomegaly, splenomegaly or mass.      Tenderness: There is no abdominal tenderness. There is no guarding or rebound.     Musculoskeletal:      Right lower leg: No edema.      Left lower leg: No edema.   Feet:      Right foot:      Skin integrity: No ulcer, skin breakdown, erythema, warmth, callus or dry skin.      Left foot:      Skin integrity: Callus present. No ulcer, skin breakdown, erythema, warmth or dry skin.   Lymphadenopathy:      Cervical: No cervical adenopathy.     Skin:     Findings: No rash.     Neurological:      General: No focal deficit present.      Mental Status: He is alert and oriented to person, place, and time.     Psychiatric:         Mood and Affect: Mood normal.     Patient's shoes and socks removed.    Right Foot/Ankle   Right Foot Inspection  Skin Exam: skin normal and skin intact. No dry skin, no warmth, no callus, no erythema, no maceration, no abnormal color, no pre-ulcer, no ulcer and no callus.     Toe Exam: ROM and strength within normal limits. No swelling, no tenderness, erythema and  no right toe deformity    Sensory   Monofilament  testing: intact    Vascular  Capillary refills: < 3 seconds  The right DP pulse is 2+. The right PT pulse is 2+.     Left Foot/Ankle  Left Foot Inspection  Skin Exam: skin normal, skin intact and callus. No dry skin, no warmth, no erythema, no maceration, normal color, no pre-ulcer and no ulcer.     Toe Exam: No swelling, no tenderness, no erythema and no left toe deformity.     Sensory   Monofilament testing: intact    Vascular  Capillary refills: < 3 seconds  The left DP pulse is 2+. The left PT pulse is 2+.     Assign Risk Category  No deformity present  No loss of protective sensation  No weak pulses  Risk: 0

## 2025-05-20 NOTE — ASSESSMENT & PLAN NOTE
Hyperlipidemia mixed type on Atorvastatin 10 mg daily and 2 fish oils/day       Lab Results   Component Value Date    CHOLESTEROL 111 05/15/2025    CHOLESTEROL 112 10/17/2024    CHOLESTEROL 121 04/15/2024     Lab Results   Component Value Date    HDL 30 05/15/2025    HDL 32 10/17/2024    HDL 32 04/15/2024     Lab Results   Component Value Date    TRIG 123 05/15/2025    TRIG 118 10/17/2024    TRIG 129 04/15/2024     Lab Results   Component Value Date    LDLCALC 56 05/15/2025       Orders:    Lipid panel

## 2025-05-20 NOTE — ASSESSMENT & PLAN NOTE
Type 2 DM diet controlled. 05/2025 . A1c 6.3 decreased from 6.5. 102/204 Urine albumin normal at 2.5. on ACE. No DPN. Current with eye exam  No history of retinopathy.     Lab Results   Component Value Date    HGBA1C 6.3 (H) 05/15/2025       Orders:    Hemoglobin A1C    Albumin / creatinine urine ratio; Future    Continue with current medications.  Diet weight loss and exercise.  Office visit 6 months with repeat labs

## 2025-05-20 NOTE — ASSESSMENT & PLAN NOTE
Lab Results   Component Value Date    ALT 17 05/15/2025    AST 13 05/15/2025    ALKPHOS 67 05/15/2025     FIB-4 score 0.92

## 2025-05-20 NOTE — ASSESSMENT & PLAN NOTE
Blood pressures have been stable on Lisinopril 10 mg daily.     BP Readings from Last 3 Encounters:   05/20/25 130/76   10/30/24 130/68   04/30/24 132/66           Lab Results   Component Value Date    SODIUM 137 05/15/2025    K 4.8 05/15/2025     05/15/2025    CO2 27 05/15/2025    AGAP 8 05/15/2025    BUN 21 05/15/2025    CREATININE 0.89 05/15/2025    GLUC 110 (H) 05/15/2025    CALCIUM 8.8 05/15/2025    AST 13 05/15/2025    ALT 17 05/15/2025    ALKPHOS 67 05/15/2025    TP 6.4 05/15/2025    TBILI 0.5 05/15/2025    EGFR 91 05/15/2025     Lab Results   Component Value Date    WBC 7.3 05/15/2025    HGB 13.0 05/15/2025    HCT 38.9 05/15/2025    MCV 92 05/15/2025     05/15/2025     Orders:    Comprehensive metabolic panel    CBC and differential

## 2025-08-01 ENCOUNTER — TELEPHONE (OUTPATIENT)
Dept: FAMILY MEDICINE CLINIC | Facility: CLINIC | Age: 70
End: 2025-08-01

## (undated) RX ORDER — LIFITEGRAST 50 MG/ML: 1 SOLUTION/ DROPS OPHTHALMIC TWICE A DAY

## (undated) RX ORDER — PREDNISOLONE ACETATE 10 MG/ML: 1 SUSPENSION/ DROPS OPHTHALMIC TWICE A DAY

## (undated) RX ORDER — BROMFENAC SODIUM 0.7 MG/ML: 1 SOLUTION/ DROPS OPHTHALMIC ONCE A DAY